# Patient Record
Sex: MALE | Race: WHITE | NOT HISPANIC OR LATINO | Employment: OTHER | URBAN - METROPOLITAN AREA
[De-identification: names, ages, dates, MRNs, and addresses within clinical notes are randomized per-mention and may not be internally consistent; named-entity substitution may affect disease eponyms.]

---

## 2018-07-05 ENCOUNTER — TRANSCRIBE ORDERS (OUTPATIENT)
Dept: ADMINISTRATIVE | Facility: HOSPITAL | Age: 63
End: 2018-07-05

## 2018-07-05 DIAGNOSIS — N20.0 CALCULUS OF KIDNEY: Primary | ICD-10-CM

## 2018-07-06 ENCOUNTER — APPOINTMENT (OUTPATIENT)
Dept: LAB | Facility: CLINIC | Age: 63
End: 2018-07-06
Payer: COMMERCIAL

## 2018-07-06 ENCOUNTER — TRANSCRIBE ORDERS (OUTPATIENT)
Dept: LAB | Facility: CLINIC | Age: 63
End: 2018-07-06

## 2018-07-06 DIAGNOSIS — M10.9 GOUT, UNSPECIFIED CAUSE, UNSPECIFIED CHRONICITY, UNSPECIFIED SITE: ICD-10-CM

## 2018-07-06 DIAGNOSIS — N20.0 URIC ACID NEPHROLITHIASIS: ICD-10-CM

## 2018-07-06 DIAGNOSIS — Z12.5 SPECIAL SCREENING FOR MALIGNANT NEOPLASM OF PROSTATE: ICD-10-CM

## 2018-07-06 DIAGNOSIS — N20.0 URIC ACID NEPHROLITHIASIS: Primary | ICD-10-CM

## 2018-07-06 LAB
ALBUMIN SERPL BCP-MCNC: 4.2 G/DL (ref 3.5–5)
ALP SERPL-CCNC: 48 U/L (ref 46–116)
ALT SERPL W P-5'-P-CCNC: 35 U/L (ref 12–78)
ANION GAP SERPL CALCULATED.3IONS-SCNC: 8 MMOL/L (ref 4–13)
AST SERPL W P-5'-P-CCNC: 18 U/L (ref 5–45)
BACTERIA UR QL AUTO: ABNORMAL /HPF
BASOPHILS # BLD AUTO: 0.05 THOUSANDS/ΜL (ref 0–0.1)
BASOPHILS NFR BLD AUTO: 1 % (ref 0–1)
BILIRUB SERPL-MCNC: 1.07 MG/DL (ref 0.2–1)
BILIRUB UR QL STRIP: NEGATIVE
BUN SERPL-MCNC: 18 MG/DL (ref 5–25)
CA-I BLD-SCNC: 1.17 MMOL/L (ref 1.12–1.32)
CALCIUM SERPL-MCNC: 9 MG/DL (ref 8.3–10.1)
CHLORIDE SERPL-SCNC: 105 MMOL/L (ref 100–108)
CHOLEST SERPL-MCNC: 150 MG/DL (ref 50–200)
CLARITY UR: ABNORMAL
CO2 SERPL-SCNC: 25 MMOL/L (ref 21–32)
COLOR UR: YELLOW
CREAT SERPL-MCNC: 1.21 MG/DL (ref 0.6–1.3)
CREAT UR-MCNC: 77.4 MG/DL
CREAT UR-MCNC: 77.4 MG/DL
EOSINOPHIL # BLD AUTO: 0.07 THOUSAND/ΜL (ref 0–0.61)
EOSINOPHIL NFR BLD AUTO: 1 % (ref 0–6)
ERYTHROCYTE [DISTWIDTH] IN BLOOD BY AUTOMATED COUNT: 12.5 % (ref 11.6–15.1)
GFR SERPL CREATININE-BSD FRML MDRD: 64 ML/MIN/1.73SQ M
GLUCOSE P FAST SERPL-MCNC: 83 MG/DL (ref 65–99)
GLUCOSE UR STRIP-MCNC: NEGATIVE MG/DL
HCT VFR BLD AUTO: 46.8 % (ref 36.5–49.3)
HDLC SERPL-MCNC: 42 MG/DL (ref 40–60)
HGB BLD-MCNC: 15.9 G/DL (ref 12–17)
HGB UR QL STRIP.AUTO: ABNORMAL
HYALINE CASTS #/AREA URNS LPF: ABNORMAL /LPF
IMM GRANULOCYTES # BLD AUTO: 0.02 THOUSAND/UL (ref 0–0.2)
IMM GRANULOCYTES NFR BLD AUTO: 0 % (ref 0–2)
KETONES UR STRIP-MCNC: NEGATIVE MG/DL
LDLC SERPL CALC-MCNC: 96 MG/DL (ref 0–100)
LEUKOCYTE ESTERASE UR QL STRIP: NEGATIVE
LYMPHOCYTES # BLD AUTO: 2.3 THOUSANDS/ΜL (ref 0.6–4.47)
LYMPHOCYTES NFR BLD AUTO: 36 % (ref 14–44)
MCH RBC QN AUTO: 32.4 PG (ref 26.8–34.3)
MCHC RBC AUTO-ENTMCNC: 34 G/DL (ref 31.4–37.4)
MCV RBC AUTO: 95 FL (ref 82–98)
MICROALBUMIN UR-MCNC: 97.7 MG/L (ref 0–20)
MICROALBUMIN/CREAT 24H UR: 126 MG/G CREATININE (ref 0–30)
MONOCYTES # BLD AUTO: 0.82 THOUSAND/ΜL (ref 0.17–1.22)
MONOCYTES NFR BLD AUTO: 13 % (ref 4–12)
NEUTROPHILS # BLD AUTO: 3.2 THOUSANDS/ΜL (ref 1.85–7.62)
NEUTS SEG NFR BLD AUTO: 49 % (ref 43–75)
NITRITE UR QL STRIP: NEGATIVE
NON-SQ EPI CELLS URNS QL MICRO: ABNORMAL /HPF
NONHDLC SERPL-MCNC: 108 MG/DL
NRBC BLD AUTO-RTO: 0 /100 WBCS
PH UR STRIP.AUTO: 6 [PH] (ref 4.5–8)
PHOSPHATE SERPL-MCNC: 2.3 MG/DL (ref 2.3–4.1)
PLATELET # BLD AUTO: 273 THOUSANDS/UL (ref 149–390)
PMV BLD AUTO: 10.3 FL (ref 8.9–12.7)
POTASSIUM SERPL-SCNC: 4.2 MMOL/L (ref 3.5–5.3)
PROT SERPL-MCNC: 7.7 G/DL (ref 6.4–8.2)
PROT UR STRIP-MCNC: ABNORMAL MG/DL
PROT UR-MCNC: 18 MG/DL
PROT/CREAT UR: 0.23 MG/G{CREAT} (ref 0–0.1)
RBC # BLD AUTO: 4.91 MILLION/UL (ref 3.88–5.62)
RBC #/AREA URNS AUTO: ABNORMAL /HPF
SODIUM SERPL-SCNC: 138 MMOL/L (ref 136–145)
SP GR UR STRIP.AUTO: 1.01 (ref 1–1.03)
TRIGL SERPL-MCNC: 62 MG/DL
URATE SERPL-MCNC: 7 MG/DL (ref 4.2–8)
UROBILINOGEN UR QL STRIP.AUTO: 0.2 E.U./DL
WBC # BLD AUTO: 6.46 THOUSAND/UL (ref 4.31–10.16)
WBC #/AREA URNS AUTO: ABNORMAL /HPF

## 2018-07-06 PROCEDURE — 80053 COMPREHEN METABOLIC PANEL: CPT

## 2018-07-06 PROCEDURE — 80061 LIPID PANEL: CPT

## 2018-07-06 PROCEDURE — 85025 COMPLETE CBC W/AUTO DIFF WBC: CPT

## 2018-07-06 PROCEDURE — 84153 ASSAY OF PSA TOTAL: CPT

## 2018-07-06 PROCEDURE — 82043 UR ALBUMIN QUANTITATIVE: CPT | Performed by: INTERNAL MEDICINE

## 2018-07-06 PROCEDURE — 82330 ASSAY OF CALCIUM: CPT

## 2018-07-06 PROCEDURE — 84154 ASSAY OF PSA FREE: CPT

## 2018-07-06 PROCEDURE — 84550 ASSAY OF BLOOD/URIC ACID: CPT

## 2018-07-06 PROCEDURE — 81001 URINALYSIS AUTO W/SCOPE: CPT | Performed by: INTERNAL MEDICINE

## 2018-07-06 PROCEDURE — 36415 COLL VENOUS BLD VENIPUNCTURE: CPT

## 2018-07-06 PROCEDURE — 84156 ASSAY OF PROTEIN URINE: CPT | Performed by: INTERNAL MEDICINE

## 2018-07-06 PROCEDURE — 84100 ASSAY OF PHOSPHORUS: CPT

## 2018-07-06 PROCEDURE — 82570 ASSAY OF URINE CREATININE: CPT | Performed by: INTERNAL MEDICINE

## 2018-07-07 LAB
PSA FREE MFR SERPL: 10.9 %
PSA FREE SERPL-MCNC: 0.51 NG/ML
PSA SERPL-MCNC: 4.7 NG/ML (ref 0–4)

## 2018-07-10 ENCOUNTER — HOSPITAL ENCOUNTER (OUTPATIENT)
Dept: CT IMAGING | Facility: HOSPITAL | Age: 63
Discharge: HOME | End: 2018-07-10
Attending: INTERNAL MEDICINE
Payer: COMMERCIAL

## 2018-07-10 DIAGNOSIS — N20.0 CALCULUS OF KIDNEY: ICD-10-CM

## 2018-07-10 PROCEDURE — 74176 CT ABD & PELVIS W/O CONTRAST: CPT

## 2018-09-05 ENCOUNTER — TRANSCRIBE ORDERS (OUTPATIENT)
Dept: ADMINISTRATIVE | Facility: HOSPITAL | Age: 63
End: 2018-09-05

## 2018-09-05 DIAGNOSIS — R31.9 HEMATURIA SYNDROME: Primary | ICD-10-CM

## 2018-09-06 ENCOUNTER — TELEPHONE (OUTPATIENT)
Dept: UROLOGY | Facility: AMBULATORY SURGERY CENTER | Age: 63
End: 2018-09-06

## 2018-09-06 DIAGNOSIS — R31.0 GROSS HEMATURIA: Primary | ICD-10-CM

## 2018-09-06 NOTE — TELEPHONE ENCOUNTER
PT has pending appointment for CT that was ordered by Dr Rishabh Henry for 9/12/18 - this appointment can be kept, central scheduling just needs to be notified so it can be changed  I called PT and and notified him of this that we may keep appt for CT on 9/12/18 at 3pm - no oral contrast is required  I then scheduled PT for cysto in [address] for 10/15/18 at 9:30am  I called central scheduling and the order was switched so CT Renal Protocol will be done on 9/12/18

## 2018-09-06 NOTE — TELEPHONE ENCOUNTER
Could you please order a new CT scan for patient? He would like to get it done before he comes in for appointment

## 2018-09-06 NOTE — TELEPHONE ENCOUNTER
Patient called to schedule an appointment in the [place] office  CT on file  He can be reached at [phone]    Reason for appointment/Complaint/Diagnosis : Mass on bladder    Insurance: [employer]    History of Cancer? no                       If yes, what kind? Previous urologist?     NO                  Records requested/where? In epic    Outside testing/where? NO    Location Preference for office visit?  [place]

## 2018-09-06 NOTE — TELEPHONE ENCOUNTER
Please schedule CT renal protocol ordered my Dr Lynn and cancel the other one that was ordered by Dr Augustina Breen  Patient can go any time and any day  Please contact patient once scheduled and schedule patient for a cysto after CT scan  Thank you

## 2018-09-12 ENCOUNTER — HOSPITAL ENCOUNTER (OUTPATIENT)
Dept: CT IMAGING | Facility: HOSPITAL | Age: 63
Discharge: HOME | End: 2018-09-12
Attending: INTERNAL MEDICINE
Payer: COMMERCIAL

## 2018-09-12 DIAGNOSIS — R31.0 GROSS HEMATURIA: ICD-10-CM

## 2018-09-12 PROCEDURE — 74178 CT ABD&PLV WO CNTR FLWD CNTR: CPT

## 2018-09-12 RX ADMIN — IOHEXOL 100 ML: 350 INJECTION, SOLUTION INTRAVENOUS at 15:11

## 2018-09-17 ENCOUNTER — TELEPHONE (OUTPATIENT)
Dept: UROLOGY | Facility: AMBULATORY SURGERY CENTER | Age: 63
End: 2018-09-17

## 2018-09-17 NOTE — TELEPHONE ENCOUNTER
Shan Hansen  This patients CT scan was denied with the insurance will require a peer to peer to see if you can get it approved   You do not need to make an appointment for this insurance number is [id number] tracking#[id number]    Thanks  Gama Hammond

## 2018-10-15 ENCOUNTER — PROCEDURE VISIT (OUTPATIENT)
Dept: UROLOGY | Facility: CLINIC | Age: 63
End: 2018-10-15
Payer: COMMERCIAL

## 2018-10-15 VITALS
BODY MASS INDEX: 28.88 KG/M2 | HEART RATE: 80 BPM | SYSTOLIC BLOOD PRESSURE: 150 MMHG | WEIGHT: 195 LBS | DIASTOLIC BLOOD PRESSURE: 80 MMHG | HEIGHT: 69 IN

## 2018-10-15 DIAGNOSIS — R31.0 GROSS HEMATURIA: Primary | ICD-10-CM

## 2018-10-15 DIAGNOSIS — N32.89 BLADDER MASS: ICD-10-CM

## 2018-10-15 PROCEDURE — 52000 CYSTOURETHROSCOPY: CPT | Performed by: UROLOGY

## 2018-10-15 PROCEDURE — 99203 OFFICE O/P NEW LOW 30 MIN: CPT | Performed by: UROLOGY

## 2018-10-15 RX ORDER — ALLOPURINOL 100 MG/1
TABLET ORAL
COMMUNITY
Start: 2018-10-05 | End: 2018-11-09 | Stop reason: DRUGHIGH

## 2018-10-15 RX ORDER — MULTIVITAMIN
1 TABLET ORAL DAILY
COMMUNITY
End: 2019-10-10

## 2018-10-15 RX ORDER — LISINOPRIL 2.5 MG/1
2.5 TABLET ORAL
COMMUNITY
Start: 2018-10-05 | End: 2019-10-16 | Stop reason: SDUPTHER

## 2018-10-15 NOTE — PROGRESS NOTES
UROLOGY NEW CONSULT NOTE     CHIEF COMPLAINT   Anne Quiroz is a 59 y o  male with a complaint of   Chief Complaint   Patient presents with   Eather Ballico Hematuria    Cystoscopy       History of Present Illness:     59 y o  male who developed gross hematuria and some voiding dysfunction in January of 2017  The patient has had several occurrences over the last almost 2 years  He thought that he was passing stones although he has never had a history of stone disease in the past   Patient finally sought out care with a nephrologist who recommended return to our office  Patient was able to obtain a CT urogram prior to his visit  He has never been a cigarette or cigar smoker and does not use smokeless tobacco   There is a cancer history in his family but not renal or bladder  Past Medical History:     Past Medical History:   Diagnosis Date    Hypertension     Tinnitus        PAST SURGICAL HISTORY:   History reviewed  No pertinent surgical history  CURRENT MEDICATIONS:     Current Outpatient Prescriptions   Medication Sig Dispense Refill    allopurinol (ZYLOPRIM) 100 mg tablet       lisinopril (ZESTRIL) 2 5 mg tablet       Multiple Vitamin (MULTIVITAMIN) tablet Take 1 tablet by mouth daily       No current facility-administered medications for this visit  ALLERGIES:   No Known Allergies    SOCIAL HISTORY:     Social History     Social History    Marital status:      Spouse name: N/A    Number of children: N/A    Years of education: N/A     Social History Main Topics    Smoking status: Never Smoker    Smokeless tobacco: Never Used    Alcohol use Yes    Drug use: No    Sexual activity: Not Asked     Other Topics Concern    None     Social History Narrative    None       SOCIAL HISTORY:   History reviewed  No pertinent family history  REVIEW OF SYSTEMS:     Review of Systems   Constitutional: Negative  Respiratory: Negative  Cardiovascular: Negative      Gastrointestinal: Negative  Genitourinary: Positive for hematuria  Musculoskeletal: Negative  Neurological: Negative  Psychiatric/Behavioral: Negative  PHYSICAL EXAM:     /80 (BP Location: Right arm, Patient Position: Sitting, Cuff Size: Standard)   Pulse 80   Ht 5' 9" (1 753 m)   Wt 88 5 kg (195 lb)   BMI 28 80 kg/m²     General:  Healthy appearing male in no acute distress  They have a normal affect  There is not appear to be any gross neurologic defects or abnormalities  HEENT:  Normocephalic, atraumatic  Neck is supple without any palpable lymphadenopathy  Cardiovascular:  Patient has normal palpable distal radial pulses  There is no significant peripheral edema  No JVD is noted  Respiratory:  Patient has unlabored respirations  There is no audible wheeze or rhonchi  Abdomen:  Abdomen is without surgical scars  Abdomen is soft and nontender  There is no tympany  Inguinal and umbilical hernia are not appreciated  Genitourinary:   Circumcised phallus, orthotopic meatus, testicles descended    Musculoskeletal:  Patient does not have significant CVA tenderness in the  flank with palpation or percussion  They full range of motion in all 4 extremities  Strength in all 4 extremities appears congruent  Patient is able to ambulate without assistance or difficulty  Dermatologic:  Patient has no skin abnormalities or rashes        LABS:     CBC:   Lab Results   Component Value Date    WBC 6 46 2018    HGB 15 9 2018    HCT 46 8 2018    MCV 95 2018     2018       BMP:   Lab Results   Component Value Date    CALCIUM 9 0 2018     2018    K 4 2 2018    CO2 25 2018     2018    BUN 18 2018    CREATININE 1 21 2018     Lab Results   Component Value Date    PSA 4 7 (H) 2018     IMAGIN/12/18  CT ABDOMEN AND PELVIS WITH AND WITHOUT IV CONTRAST     INDICATION:   R31 0: Gross hematuria      COMPARISON: CT scan 7/10/2018      TECHNIQUE: Initial CT of the abdomen and pelvis was performed without intravenous contrast   Subsequent dynamic CT evaluation of the abdomen and pelvis was performed after the administration of intravenous contrast in both nephrographic and delayed   phases after the administration of intravenous contrast   Axial, sagittal, and coronal 2D reformatted images were created from the source data and submitted for interpretation       Radiation dose length product (DLP) for this visit:  1852 mGy-cm   This examination, like all CT scans performed in the [facility], was performed utilizing techniques to minimize radiation dose exposure, including the use of iterative   reconstruction and automated exposure control      IV Contrast:  100 mL of iohexol (OMNIPAQUE)  Enteric Contrast:  Enteric contrast was not administered      FINDINGS:     ABDOMEN     RIGHT KIDNEY AND URETER:  No solid renal mass  No detectable urothelial mass  No hydronephrosis or hydroureter  No urinary tract calculi  No perinephric collection      LEFT KIDNEY AND URETER:  No solid renal mass  No detectable urothelial mass  No hydronephrosis or hydroureter  No urinary tract calculi  No perinephric collection      URINARY BLADDER:  There is a 3 3 cm soft tissue mass in the left posterior lateral aspect of the bladder, adjacent to the ureteral orifice  No calculi         LOWER CHEST:  No clinically significant abnormality identified in the visualized lower chest      LIVER/BILIARY TREE:  Unremarkable      GALLBLADDER:  No calcified gallstones  No pericholecystic inflammatory change      SPLEEN:  Unremarkable      PANCREAS:  Unremarkable      ADRENAL GLANDS:  Unremarkable      STOMACH AND BOWEL:  There is colonic diverticulosis without evidence of acute diverticulitis      ABDOMINOPELVIC CAVITY:  No ascites  No free intraperitoneal air   No lymphadenopathy      VESSELS:  Unremarkable for patient's age      PELVIS     REPRODUCTIVE ORGANS:  Unremarkable for patient's age      APPENDIX: No findings to suggest appendicitis      ABDOMINAL WALL/INGUINAL REGIONS:  There is a small fat-containing umbilical hernia      OSSEOUS STRUCTURES:  No acute fracture or destructive osseous lesion      IMPRESSION:     3 3 cm mass in the left posterior lateral aspect of the urinary bladder  No evidence of metastatic disease      Colonic diverticulosis  PROCEDURE:     See note    ASSESSMENT:     59 y o  male with gross hematuria    PLAN:     Patient has a large left-sided bladder mass  It is difficult to visualize the left ureteral orifice  Given the findings of today's procedure, I have recommended moving forward with cystoscopy and transurethral resection of his bladder tumor in the operating room  I have recommended 1 time installation of mitomycin-C post procedure  Risks and benefits of the procedure been discussed the patient and he has signed informed consent  We will schedule the surgery in the near future

## 2018-10-15 NOTE — PROGRESS NOTES
Cystoscopy  Date/Time: 10/15/2018 9:47 AM  Performed by: Claudene Poser  Authorized by: Claudene Poser     Procedure details: cystoscopy    Patient tolerance: Patient tolerated the procedure well with no immediate complications    Additional Procedure Details:   Cystoscopy procedure note:    Risk and benefits of flexible cystoscopy were discussed  Informed consent was obtained  Urine dip was adequate for cystoscopy  The patient was placed in the supine position  His genitalia was prepped with Betadine and draped in a sterile fashion  Viscous 2% lidocaine jelly was instilled into the urethra and allowed dwell time for local anesthesia  Flexible cystoscopy was then performed using a 16F scope  The distal urethra and prostatic urethra were evaluated and were normal in course and caliber  Once inside the bladder, it was carefully inspected in a 360 degree fashion  We medially encounter a large papillary tumor along the left trigone and lateral wall that was papillary in appearance  Left ureteral orifice was difficult to appreciate  Retroflexion re-demonstrated the tumor  Overall this was a cystoscopy that demonstrated a large left-sided bladder tumor

## 2018-10-15 NOTE — PATIENT INSTRUCTIONS
Transurethral Resection of Bladder Tumors   WHAT YOU NEED TO KNOW:   Transurethral resection of bladder tumors (TURBT) is surgery to remove one or more tumors from your bladder  HOW TO PREPARE:   The week before your surgery:   · Write down the correct date, time, and location of your surgery  · Arrange a ride home  Ask a family member or friend to drive you home after your surgery or procedure  Do not drive yourself home  · Ask your caregiver if you need to stop using aspirin or any other prescribed or over-the-counter medicine before your procedure or surgery  · Bring your medicine bottles or a list of your medicines when you see your caregiver  Tell your caregiver if you are allergic to any medicine  Tell your caregiver if you use any herbs, food supplements, or over-the-counter medicine  · You may need blood or urine tests before your surgery  You may also need a CT scan or a cystoscopy  Talk to your healthcare provider about these or other tests you may need  Write down the date, time, and location for each test   The night before your surgery:  Ask caregivers about directions for eating and drinking  The day of your surgery:   · Ask your caregiver before you take any medicine on the day of your surgery  Bring a list of all the medicines you take, or your pill bottles, with you to the hospital  Caregivers will check that your medicines will not interact poorly with the medicine you need for surgery  · You or a close family member will be asked to sign a legal document called a consent form  It gives caregivers permission to do the procedure or surgery  It also explains the problems that may happen, and your choices  Make sure all your questions are answered before you sign this form  · Caregivers may insert an intravenous tube (IV) into your vein  A vein in the arm is usually chosen  Through the IV tube, you may be given liquids and medicine       · An anesthesiologist will talk to you before your surgery  You may need medicine to keep you asleep or numb an area of your body during surgery  Tell caregivers if you or anyone in your family has had a problem with anesthesia in the past   WHAT WILL HAPPEN:   What will happen: Your surgeon will insert a scope through your urethra and into your bladder  He will put fluid through the scope to wash your bladder and widen it for surgery  The scope will have a wire with an electric current  The current is used to stop bleeding in your bladder and remove bladder tumors  Your surgeon may also remove muscle and tissue from your bladder  He may use the scope to insert medicine into your bladder  The medicine will destroy pieces of tumor in your bladder and help prevent new tumors from growing  Your surgeon will remove the scope  After your surgery: You will be taken to a room to rest until you are fully awake  You will be monitored closely for any problems  Do not get out of bed until your healthcare provider says it is okay  You will then be able to go home or be taken to your hospital room  CONTACT YOUR HEALTHCARE PROVIDER IF:   · You cannot make it to your surgery  · You have a fever  · You get a cold or the flu  · You have questions or concerns about your surgery  SEEK CARE IMMEDIATELY IF:   · You have bleeding from your urethra that does not stop  · You start to urinate less often, very little, or not at all  · You have severe pain in your abdomen or pelvis  RISKS:   You may bleed more than expected or get an infection  Your bladder may be damaged  It may be painful to urinate, or you may have blood in your urine  You may feel discomfort in your abdomen or pelvis  You may feel like you need to urinate more often or without warning  You may develop more bladder tumors  CARE AGREEMENT:   You have the right to help plan your care  Learn about your health condition and how it may be treated   Discuss treatment options with your caregivers to decide what care you want to receive  You always have the right to refuse treatment  © 2017 [employer] Information is for End User's use only and may not be sold, redistributed or otherwise used for commercial purposes  All illustrations and images included in CareNotes® are the copyrighted property of A [employer] M [employer] Inc  or [employer]  The above information is an  only  It is not intended as medical advice for individual conditions or treatments  Talk to your doctor, nurse or pharmacist before following any medical regimen to see if it is safe and effective for you

## 2018-10-15 NOTE — LETTER
October 15, 2018     Kiera Hernandez, [address] [id number] [facility] [address]    Patient: Kumar Crowe   YOB: 1955   Date of Visit: 10/15/2018       Dear Dr Miroslava Rao: Thank you for referring Berta Guerrier to me for evaluation  Below are my notes for this consultation  If you have questions, please do not hesitate to call me  I look forward to following your patient along with you  Sincerely,        Kate Chiang MD        CC: Laverna Kehr, MD Evy Mungo, MD  10/15/2018  9:47 AM  Sign at close encounter  Cystoscopy  Date/Time: 10/15/2018 9:47 AM  Performed by: Rommie Crigler by: Tigist Richards     Procedure details: cystoscopy    Patient tolerance: Patient tolerated the procedure well with no immediate complications    Additional Procedure Details:   Cystoscopy procedure note:    Risk and benefits of flexible cystoscopy were discussed  Informed consent was obtained  Urine dip was adequate for cystoscopy  The patient was placed in the supine position  His genitalia was prepped with Betadine and draped in a sterile fashion  Viscous 2% lidocaine jelly was instilled into the urethra and allowed dwell time for local anesthesia  Flexible cystoscopy was then performed using a 16F scope  The distal urethra and prostatic urethra were evaluated and were normal in course and caliber  Once inside the bladder, it was carefully inspected in a 360 degree fashion  We medially encounter a large papillary tumor along the left trigone and lateral wall that was papillary in appearance  Left ureteral orifice was difficult to appreciate  Retroflexion re-demonstrated the tumor  Overall this was a cystoscopy that demonstrated a large left-sided bladder tumor                  Kate Chiang MD  10/15/2018  9:46 AM  Sign at close encounter    [address]   Kumar Crowe is a 59 y o  male with a complaint of   Chief Complaint   Patient presents with   Gordon [place] Hematuria    Cystoscopy       History of Present Illness:     59 y o  male who developed gross hematuria and some voiding dysfunction in January of 2017  The patient has had several occurrences over the last almost 2 years  He thought that he was passing stones although he has never had a history of stone disease in the past   Patient finally sought out care with a nephrologist who recommended return to our office  Patient was able to obtain a CT urogram prior to his visit  He has never been a cigarette or cigar smoker and does not use smokeless tobacco   There is a cancer history in his family but not renal or bladder  Past Medical History:     Past Medical History:   Diagnosis Date    Hypertension     Tinnitus        PAST SURGICAL HISTORY:   History reviewed  No pertinent surgical history  CURRENT MEDICATIONS:     Current Outpatient Prescriptions   Medication Sig Dispense Refill    allopurinol (ZYLOPRIM) 100 mg tablet       lisinopril (ZESTRIL) 2 5 mg tablet       Multiple Vitamin (MULTIVITAMIN) tablet Take 1 tablet by mouth daily       No current facility-administered medications for this visit  ALLERGIES:   No Known Allergies    SOCIAL HISTORY:     Social History     Social History    Marital status:      Spouse name: N/A    Number of children: N/A    Years of education: N/A     Social History Main Topics    Smoking status: Never Smoker    Smokeless tobacco: Never Used    Alcohol use Yes    Drug use: No    Sexual activity: Not Asked     Other Topics Concern    None     Social History Narrative    None       SOCIAL HISTORY:   History reviewed  No pertinent family history  REVIEW OF SYSTEMS:     Review of Systems   Constitutional: Negative  Respiratory: Negative  Cardiovascular: Negative  Gastrointestinal: Negative  Genitourinary: Positive for hematuria  Musculoskeletal: Negative  Neurological: Negative  Psychiatric/Behavioral: Negative  PHYSICAL EXAM:     /80 (BP Location: Right arm, Patient Position: Sitting, Cuff Size: Standard)   Pulse 80   Ht 5' 9" (1 753 m)   Wt 88 5 kg (195 lb)   BMI 28 80 kg/m²      General:  Healthy appearing male in no acute distress  They have a normal affect  There is not appear to be any gross neurologic defects or abnormalities  HEENT:  Normocephalic, atraumatic  Neck is supple without any palpable lymphadenopathy  Cardiovascular:  Patient has normal palpable distal radial pulses  There is no significant peripheral edema  No JVD is noted  Respiratory:  Patient has unlabored respirations  There is no audible wheeze or rhonchi  Abdomen:  Abdomen is without surgical scars  Abdomen is soft and nontender  There is no tympany  Inguinal and umbilical hernia are not appreciated  Genitourinary:   Circumcised phallus, orthotopic meatus, testicles descended    Musculoskeletal:  Patient does not have significant CVA tenderness in the  flank with palpation or percussion  They full range of motion in all 4 extremities  Strength in all 4 extremities appears congruent  Patient is able to ambulate without assistance or difficulty  Dermatologic:  Patient has no skin abnormalities or rashes        LABS:     CBC:   Lab Results   Component Value Date    WBC 6 46 2018    HGB 15 9 2018    HCT 46 8 2018    MCV 95 2018     2018       BMP:   Lab Results   Component Value Date    CALCIUM 9 0 2018     2018    K 4 2 2018    CO2 25 2018     2018    BUN 18 2018    CREATININE 1 21 2018     Lab Results   Component Value Date    PSA 4 7 (H) 2018     IMAGIN/12/18  CT ABDOMEN AND PELVIS WITH AND WITHOUT IV CONTRAST     INDICATION:   R31 0: Gross hematuria      COMPARISON:  CT scan 7/10/2018      TECHNIQUE: Initial CT of the abdomen and pelvis was performed without intravenous contrast   Subsequent dynamic CT evaluation of the abdomen and pelvis was performed after the administration of intravenous contrast in both nephrographic and delayed   phases after the administration of intravenous contrast   Axial, sagittal, and coronal 2D reformatted images were created from the source data and submitted for interpretation       Radiation dose length product (DLP) for this visit:  1852 mGy-cm   This examination, like all CT scans performed in the [facility], was performed utilizing techniques to minimize radiation dose exposure, including the use of iterative   reconstruction and automated exposure control      IV Contrast:  100 mL of iohexol (OMNIPAQUE)  Enteric Contrast:  Enteric contrast was not administered      FINDINGS:     ABDOMEN     RIGHT KIDNEY AND URETER:  No solid renal mass  No detectable urothelial mass  No hydronephrosis or hydroureter  No urinary tract calculi  No perinephric collection      LEFT KIDNEY AND URETER:  No solid renal mass  No detectable urothelial mass  No hydronephrosis or hydroureter  No urinary tract calculi  No perinephric collection      URINARY BLADDER:  There is a 3 3 cm soft tissue mass in the left posterior lateral aspect of the bladder, adjacent to the ureteral orifice  No calculi         LOWER CHEST:  No clinically significant abnormality identified in the visualized lower chest      LIVER/BILIARY TREE:  Unremarkable      GALLBLADDER:  No calcified gallstones  No pericholecystic inflammatory change      SPLEEN:  Unremarkable      PANCREAS:  Unremarkable      ADRENAL GLANDS:  Unremarkable      STOMACH AND BOWEL:  There is colonic diverticulosis without evidence of acute diverticulitis      ABDOMINOPELVIC CAVITY:  No ascites  No free intraperitoneal air   No lymphadenopathy      VESSELS:  Unremarkable for patient's age      PELVIS     REPRODUCTIVE ORGANS:  Unremarkable for patient's age      APPENDIX: No findings to suggest appendicitis      ABDOMINAL WALL/INGUINAL REGIONS:  There is a small fat-containing umbilical hernia      OSSEOUS STRUCTURES:  No acute fracture or destructive osseous lesion      IMPRESSION:     3 3 cm mass in the left posterior lateral aspect of the urinary bladder  No evidence of metastatic disease      Colonic diverticulosis  PROCEDURE:     See note    ASSESSMENT:     59 y o  male with gross hematuria    PLAN:     Patient has a large left-sided bladder mass  It is difficult to visualize the left ureteral orifice  Given the findings of today's procedure, I have recommended moving forward with cystoscopy and transurethral resection of his bladder tumor in the operating room  I have recommended 1 time installation of mitomycin-C post procedure  Risks and benefits of the procedure been discussed the patient and he has signed informed consent  We will schedule the surgery in the near future

## 2018-11-08 ENCOUNTER — ANESTHESIA EVENT (OUTPATIENT)
Dept: PERIOP | Facility: HOSPITAL | Age: 63
End: 2018-11-08
Payer: COMMERCIAL

## 2018-11-08 RX ORDER — SODIUM CHLORIDE 9 MG/ML
125 INJECTION, SOLUTION INTRAVENOUS CONTINUOUS
Status: CANCELLED | OUTPATIENT
Start: 2018-11-14

## 2018-11-09 ENCOUNTER — APPOINTMENT (OUTPATIENT)
Dept: PREADMISSION TESTING | Facility: HOSPITAL | Age: 63
End: 2018-11-09
Payer: COMMERCIAL

## 2018-11-09 ENCOUNTER — APPOINTMENT (OUTPATIENT)
Dept: LAB | Facility: HOSPITAL | Age: 63
End: 2018-11-09
Attending: UROLOGY
Payer: COMMERCIAL

## 2018-11-09 ENCOUNTER — HOSPITAL ENCOUNTER (OUTPATIENT)
Dept: NON INVASIVE DIAGNOSTICS | Facility: HOSPITAL | Age: 63
Discharge: HOME | End: 2018-11-09
Attending: UROLOGY
Payer: COMMERCIAL

## 2018-11-09 DIAGNOSIS — R31.0 GROSS HEMATURIA: ICD-10-CM

## 2018-11-09 DIAGNOSIS — N32.89 BLADDER MASS: ICD-10-CM

## 2018-11-09 LAB
ANION GAP SERPL CALCULATED.3IONS-SCNC: 10 MMOL/L (ref 4–13)
APTT PPP: 36 SECONDS (ref 24–36)
ATRIAL RATE: 67 BPM
BASOPHILS # BLD AUTO: 0.08 THOUSANDS/ΜL (ref 0–0.1)
BASOPHILS NFR BLD AUTO: 1 % (ref 0–1)
BUN SERPL-MCNC: 14 MG/DL (ref 5–25)
CALCIUM SERPL-MCNC: 9.1 MG/DL (ref 8.3–10.1)
CHLORIDE SERPL-SCNC: 102 MMOL/L (ref 100–108)
CO2 SERPL-SCNC: 26 MMOL/L (ref 21–32)
CREAT SERPL-MCNC: 1.12 MG/DL (ref 0.6–1.3)
EOSINOPHIL # BLD AUTO: 0.09 THOUSAND/ΜL (ref 0–0.61)
EOSINOPHIL NFR BLD AUTO: 1 % (ref 0–6)
ERYTHROCYTE [DISTWIDTH] IN BLOOD BY AUTOMATED COUNT: 12.4 % (ref 11.6–15.1)
GFR SERPL CREATININE-BSD FRML MDRD: 70 ML/MIN/1.73SQ M
GLUCOSE P FAST SERPL-MCNC: 92 MG/DL (ref 65–99)
HCT VFR BLD AUTO: 46 % (ref 36.5–49.3)
HGB BLD-MCNC: 15.8 G/DL (ref 12–17)
IMM GRANULOCYTES # BLD AUTO: 0.03 THOUSAND/UL (ref 0–0.2)
IMM GRANULOCYTES NFR BLD AUTO: 0 % (ref 0–2)
INR PPP: 1.07 (ref 0.86–1.17)
LYMPHOCYTES # BLD AUTO: 2.07 THOUSANDS/ΜL (ref 0.6–4.47)
LYMPHOCYTES NFR BLD AUTO: 23 % (ref 14–44)
MCH RBC QN AUTO: 32.4 PG (ref 26.8–34.3)
MCHC RBC AUTO-ENTMCNC: 34.3 G/DL (ref 31.4–37.4)
MCV RBC AUTO: 95 FL (ref 82–98)
MONOCYTES # BLD AUTO: 1.27 THOUSAND/ΜL (ref 0.17–1.22)
MONOCYTES NFR BLD AUTO: 14 % (ref 4–12)
NEUTROPHILS # BLD AUTO: 5.57 THOUSANDS/ΜL (ref 1.85–7.62)
NEUTS SEG NFR BLD AUTO: 61 % (ref 43–75)
NRBC BLD AUTO-RTO: 0 /100 WBCS
P AXIS: 37 DEGREES
PLATELET # BLD AUTO: 246 THOUSANDS/UL (ref 149–390)
PMV BLD AUTO: 9.3 FL (ref 8.9–12.7)
POTASSIUM SERPL-SCNC: 3.7 MMOL/L (ref 3.5–5.3)
PR INTERVAL: 184 MS
PROTHROMBIN TIME: 14 SECONDS (ref 11.8–14.2)
QRS AXIS: -25 DEGREES
QRSD INTERVAL: 86 MS
QT INTERVAL: 388 MS
QTC INTERVAL: 409 MS
RBC # BLD AUTO: 4.87 MILLION/UL (ref 3.88–5.62)
SODIUM SERPL-SCNC: 138 MMOL/L (ref 136–145)
T WAVE AXIS: 44 DEGREES
VENTRICULAR RATE: 67 BPM
WBC # BLD AUTO: 9.11 THOUSAND/UL (ref 4.31–10.16)

## 2018-11-09 PROCEDURE — 87086 URINE CULTURE/COLONY COUNT: CPT

## 2018-11-09 PROCEDURE — 85610 PROTHROMBIN TIME: CPT

## 2018-11-09 PROCEDURE — 36415 COLL VENOUS BLD VENIPUNCTURE: CPT

## 2018-11-09 PROCEDURE — 80048 BASIC METABOLIC PNL TOTAL CA: CPT

## 2018-11-09 PROCEDURE — 85730 THROMBOPLASTIN TIME PARTIAL: CPT

## 2018-11-09 PROCEDURE — 93005 ELECTROCARDIOGRAM TRACING: CPT

## 2018-11-09 PROCEDURE — 93010 ELECTROCARDIOGRAM REPORT: CPT | Performed by: INTERNAL MEDICINE

## 2018-11-09 PROCEDURE — 85025 COMPLETE CBC W/AUTO DIFF WBC: CPT

## 2018-11-09 RX ORDER — ACETAMINOPHEN 500 MG
1000 TABLET ORAL EVERY 6 HOURS PRN
COMMUNITY

## 2018-11-09 NOTE — ANESTHESIA PREPROCEDURE EVALUATION
Review of Systems/Medical History  Patient summary reviewed  Chart reviewed  No history of anesthetic complications     Cardiovascular  Hypertension controlled,    Pulmonary  Negative pulmonary ROS        GI/Hepatic  Negative GI/hepatic ROS          Genitourinary malignancy Bladder cancer,        Endo/Other  Negative endo/other ROS      GYN  Negative gynecology ROS          Hematology  Negative hematology ROS      Musculoskeletal  Gout,        Neurology  Negative neurology ROS      Psychology   Negative psychology ROS              Physical Exam    Airway    Mallampati score: I  TM Distance: >3 FB  Neck ROM: full     Dental   No notable dental hx     Cardiovascular  Rhythm: regular, Rate: normal, Cardiovascular exam normal    Pulmonary  Pulmonary exam normal Breath sounds clear to auscultation,     Other Findings        Anesthesia Plan  ASA Score- 2     Anesthesia Type- general with ASA Monitors  Additional Monitors:   Airway Plan:         Plan Factors-Patient not instructed to abstain from smoking on day of procedure  Patient did not smoke on day of surgery  Induction- intravenous  Postoperative Plan-     Informed Consent- Anesthetic plan and risks discussed with patient

## 2018-11-10 LAB — BACTERIA UR CULT: NORMAL

## 2018-11-14 ENCOUNTER — HOSPITAL ENCOUNTER (OUTPATIENT)
Facility: HOSPITAL | Age: 63
Setting detail: OUTPATIENT SURGERY
Discharge: HOME | End: 2018-11-14
Attending: UROLOGY | Admitting: UROLOGY
Payer: COMMERCIAL

## 2018-11-14 ENCOUNTER — ANESTHESIA (OUTPATIENT)
Dept: PERIOP | Facility: HOSPITAL | Age: 63
End: 2018-11-14
Payer: COMMERCIAL

## 2018-11-14 VITALS
RESPIRATION RATE: 16 BRPM | BODY MASS INDEX: 28.29 KG/M2 | WEIGHT: 191 LBS | DIASTOLIC BLOOD PRESSURE: 70 MMHG | HEIGHT: 69 IN | SYSTOLIC BLOOD PRESSURE: 143 MMHG | OXYGEN SATURATION: 98 % | TEMPERATURE: 97.7 F | HEART RATE: 72 BPM

## 2018-11-14 DIAGNOSIS — N32.89 BLADDER MASS: ICD-10-CM

## 2018-11-14 DIAGNOSIS — R31.0 GROSS HEMATURIA: ICD-10-CM

## 2018-11-14 PROCEDURE — 88112 CYTOPATH CELL ENHANCE TECH: CPT | Performed by: PATHOLOGY

## 2018-11-14 PROCEDURE — C1758 CATHETER, URETERAL: HCPCS | Performed by: UROLOGY

## 2018-11-14 PROCEDURE — 88307 TISSUE EXAM BY PATHOLOGIST: CPT | Performed by: PATHOLOGY

## 2018-11-14 PROCEDURE — 52240 CYSTOSCOPY AND TREATMENT: CPT | Performed by: UROLOGY

## 2018-11-14 RX ORDER — HYDROMORPHONE HCL/PF 1 MG/ML
0.5 SYRINGE (ML) INJECTION
Status: DISCONTINUED | OUTPATIENT
Start: 2018-11-14 | End: 2018-11-14 | Stop reason: HOSPADM

## 2018-11-14 RX ORDER — PROPOFOL 10 MG/ML
INJECTION, EMULSION INTRAVENOUS AS NEEDED
Status: DISCONTINUED | OUTPATIENT
Start: 2018-11-14 | End: 2018-11-14 | Stop reason: SURG

## 2018-11-14 RX ORDER — ONDANSETRON 2 MG/ML
INJECTION INTRAMUSCULAR; INTRAVENOUS AS NEEDED
Status: DISCONTINUED | OUTPATIENT
Start: 2018-11-14 | End: 2018-11-14 | Stop reason: SURG

## 2018-11-14 RX ORDER — GLYCOPYRROLATE 0.2 MG/ML
INJECTION INTRAMUSCULAR; INTRAVENOUS AS NEEDED
Status: DISCONTINUED | OUTPATIENT
Start: 2018-11-14 | End: 2018-11-14 | Stop reason: SURG

## 2018-11-14 RX ORDER — TAMSULOSIN HYDROCHLORIDE 0.4 MG/1
0.4 CAPSULE ORAL ONCE
Status: COMPLETED | OUTPATIENT
Start: 2018-11-14 | End: 2018-11-14

## 2018-11-14 RX ORDER — FENTANYL CITRATE/PF 50 MCG/ML
25 SYRINGE (ML) INJECTION
Status: DISCONTINUED | OUTPATIENT
Start: 2018-11-14 | End: 2018-11-14 | Stop reason: HOSPADM

## 2018-11-14 RX ORDER — PHENAZOPYRIDINE HYDROCHLORIDE 100 MG/1
100 TABLET, FILM COATED ORAL ONCE
Status: COMPLETED | OUTPATIENT
Start: 2018-11-14 | End: 2018-11-14

## 2018-11-14 RX ORDER — MAGNESIUM HYDROXIDE 1200 MG/15ML
LIQUID ORAL AS NEEDED
Status: DISCONTINUED | OUTPATIENT
Start: 2018-11-14 | End: 2018-11-14 | Stop reason: HOSPADM

## 2018-11-14 RX ORDER — TAMSULOSIN HYDROCHLORIDE 0.4 MG/1
0.4 CAPSULE ORAL
Qty: 30 CAPSULE | Refills: 0 | Status: SHIPPED | OUTPATIENT
Start: 2018-11-14 | End: 2018-11-19 | Stop reason: SDUPTHER

## 2018-11-14 RX ORDER — ONDANSETRON 2 MG/ML
4 INJECTION INTRAMUSCULAR; INTRAVENOUS ONCE AS NEEDED
Status: DISCONTINUED | OUTPATIENT
Start: 2018-11-14 | End: 2018-11-14 | Stop reason: HOSPADM

## 2018-11-14 RX ORDER — DEXAMETHASONE SODIUM PHOSPHATE 4 MG/ML
INJECTION, SOLUTION INTRA-ARTICULAR; INTRALESIONAL; INTRAMUSCULAR; INTRAVENOUS; SOFT TISSUE AS NEEDED
Status: DISCONTINUED | OUTPATIENT
Start: 2018-11-14 | End: 2018-11-14 | Stop reason: SURG

## 2018-11-14 RX ORDER — GLYCINE 1.5 G/100ML
SOLUTION IRRIGATION AS NEEDED
Status: DISCONTINUED | OUTPATIENT
Start: 2018-11-14 | End: 2018-11-14 | Stop reason: HOSPADM

## 2018-11-14 RX ORDER — PHENAZOPYRIDINE HYDROCHLORIDE 100 MG/1
100 TABLET, FILM COATED ORAL 3 TIMES DAILY PRN
Qty: 20 TABLET | Refills: 0 | Status: SHIPPED | OUTPATIENT
Start: 2018-11-14 | End: 2018-11-19

## 2018-11-14 RX ORDER — MIDAZOLAM HYDROCHLORIDE 1 MG/ML
INJECTION INTRAMUSCULAR; INTRAVENOUS AS NEEDED
Status: DISCONTINUED | OUTPATIENT
Start: 2018-11-14 | End: 2018-11-14 | Stop reason: SURG

## 2018-11-14 RX ORDER — ROCURONIUM BROMIDE 10 MG/ML
INJECTION, SOLUTION INTRAVENOUS AS NEEDED
Status: DISCONTINUED | OUTPATIENT
Start: 2018-11-14 | End: 2018-11-14 | Stop reason: SURG

## 2018-11-14 RX ORDER — FENTANYL CITRATE 50 UG/ML
INJECTION, SOLUTION INTRAMUSCULAR; INTRAVENOUS AS NEEDED
Status: DISCONTINUED | OUTPATIENT
Start: 2018-11-14 | End: 2018-11-14 | Stop reason: SURG

## 2018-11-14 RX ORDER — SODIUM CHLORIDE 9 MG/ML
125 INJECTION, SOLUTION INTRAVENOUS CONTINUOUS
Status: DISCONTINUED | OUTPATIENT
Start: 2018-11-14 | End: 2018-11-14 | Stop reason: HOSPADM

## 2018-11-14 RX ADMIN — MIDAZOLAM 2 MG: 1 INJECTION INTRAMUSCULAR; INTRAVENOUS at 11:50

## 2018-11-14 RX ADMIN — PHENAZOPYRIDINE HYDROCHLORIDE 100 MG: 100 TABLET ORAL at 14:27

## 2018-11-14 RX ADMIN — GLYCOPYRROLATE 0.4 MG: 0.2 INJECTION, SOLUTION INTRAMUSCULAR; INTRAVENOUS at 12:26

## 2018-11-14 RX ADMIN — NEOSTIGMINE METHYLSULFATE 3 MG: 1 INJECTION, SOLUTION INTRAMUSCULAR; INTRAVENOUS; SUBCUTANEOUS at 12:26

## 2018-11-14 RX ADMIN — ROCURONIUM BROMIDE 50 MG: 10 INJECTION INTRAVENOUS at 11:58

## 2018-11-14 RX ADMIN — TAMSULOSIN HYDROCHLORIDE 0.4 MG: 0.4 CAPSULE ORAL at 14:28

## 2018-11-14 RX ADMIN — LIDOCAINE HYDROCHLORIDE 50 MG: 20 INJECTION, SOLUTION INTRAVENOUS at 11:57

## 2018-11-14 RX ADMIN — ONDANSETRON HYDROCHLORIDE 4 MG: 2 INJECTION, SOLUTION INTRAVENOUS at 12:27

## 2018-11-14 RX ADMIN — DEXAMETHASONE SODIUM PHOSPHATE 8 MG: 4 INJECTION, SOLUTION INTRAMUSCULAR; INTRAVENOUS at 12:40

## 2018-11-14 RX ADMIN — SODIUM CHLORIDE: 0.9 INJECTION, SOLUTION INTRAVENOUS at 12:32

## 2018-11-14 RX ADMIN — PROPOFOL 200 MG: 10 INJECTION, EMULSION INTRAVENOUS at 11:57

## 2018-11-14 RX ADMIN — FENTANYL CITRATE 150 MCG: 50 INJECTION, SOLUTION INTRAMUSCULAR; INTRAVENOUS at 11:57

## 2018-11-14 RX ADMIN — DEXAMETHASONE SODIUM PHOSPHATE 4 MG: 4 INJECTION, SOLUTION INTRAMUSCULAR; INTRAVENOUS at 12:17

## 2018-11-14 RX ADMIN — SODIUM CHLORIDE 125 ML/HR: 0.9 INJECTION, SOLUTION INTRAVENOUS at 10:24

## 2018-11-14 RX ADMIN — CEFAZOLIN SODIUM 2000 MG: 2 SOLUTION INTRAVENOUS at 12:05

## 2018-11-14 RX ADMIN — FENTANYL CITRATE 50 MCG: 50 INJECTION, SOLUTION INTRAMUSCULAR; INTRAVENOUS at 12:31

## 2018-11-14 NOTE — OP NOTE
OPERATIVE REPORT  PATIENT NAME: Latesha Esteban    :  1955  MRN: [id number]  Pt Location: [facility]OM 07    SURGERY DATE: 2018    Surgeon(s) and Role:     * Kecia Rojas MD - Primary    Preop Diagnosis:  Gross hematuria [R31 0]  Bladder mass [N32 89]    Post-Op Diagnosis Codes:     * Gross hematuria [R31 0]     * Bladder mass [N32 89]    Procedure(s) (LRB):  TRANSURETHRAL RESECTION OF BLADDER TUMOR (TURBT) (N/A)  INSTILLATION MITOMYCIN (N/A)    Specimen(s):  ID Type Source Tests Collected by Time Destination   1 :  Urine Urine, Cystoscopic CYTOLOGY, URINE Kecia Rojas MD 2018 1218    2 : prostate tissue Tissue Prostate TISSUE EXAM Kecia Rojas MD 2018 1225        Estimated Blood Loss:   Minimal    Drains:  Urethral Catheter Coude 18 Fr  (Active)   Number of days: 0       Anesthesia Type:   General    Operative Indications:  Gross hematuria [R31 0]  Bladder mass [N32 89]      Operative Findings:  1  Large (>5cm) tumor of the LEFT trigone    Complications:   None    Procedure and Technique:  Latesha Esteban is a 59y o -year-old male with a history of hematuria  Flexible cystoscopy in the office revealed tumor along the left silvia trigone with difficulty visualizing the left ureteral orifice  The remainder of the bladder was free and clear of bladder tumor  Risk and benefits of bladder biopsy and transurethral resection of the recurrent bladder tumor were discussed and reviewed  Informed consent was obtained  Patient was brought to the operating room on 2018  After the smooth induction of general anesthesia, the patient was placed in the dorsal lithotomy position  His genitalia was prepped and draped in a sterile fashion  Intravenous antibiotics were administered  A timeout was performed with all members of the operative team confirming the patient's identity and procedure to be performed  A 22 [place]ian rigid cystoscope with 30° lens was inserted    The bladder was thoroughly inspected  The left-sided trigone bladder tumor was identified  No other mucosal abnormalities or lesions were identified  The bilateral ureteral orifices were also visualized with clear efflux of urine  The cystoscope was exchanged for the continuous-flow resectoscope  There was difficulty passing the larger bore scope through the patient's bulbar urethra, this required serial dilation  Transurethral resection of the large (greater than 5 cm)-sized bladder tumor was performed  The tumor was sent as specimen  The left ureteral orifice was un involved in the resection or fulguration  The base of the tumor was fulgurated with electrocautery  Hemostasis was excellent  The bladder was left full the cystoscope was removed  A 18 [place] Bear catheter was inserted and the bladder was drained  40 mg of intravesical mitomycin was then instilled and 40 cc of sterile water into the bladder  The catheter was capped  Overall the patient tolerated the procedure well  The patient was extubated in the operating room and transferred to the PACU in stable condition at the conclusion of the case  Plan-patient's catheter will be removed in 1 hr    He will return in 2 weeks for pathology follow-up     I was present for the entire procedure    Patient Disposition:  PACU     SIGNATURE: Su Antonio MD  DATE: November 14, 2018  TIME: 12:32 PM

## 2018-11-14 NOTE — H&P
Office note reviewed from 10/15/18  No changes since this visit  Will proceed with cystoscopy and bladder tumor resection as planned  Consent previously signed 10/18/18

## 2018-11-14 NOTE — DISCHARGE INSTRUCTIONS
Transurethral Resection of Bladder Tumors   WHAT YOU NEED TO KNOW:   Transurethral resection of bladder tumors (TURBT) is surgery to remove one or more tumors from your bladder  DISCHARGE INSTRUCTIONS:   Medicines:   · Medicines  help decrease pain or prevent vomiting  · Take your medicine as directed  Contact your healthcare provider if you think your medicine is not helping or if you have side effects  Tell him or her if you are allergic to any medicine  Keep a list of the medicines, vitamins, and herbs you take  Include the amounts, and when and why you take them  Bring the list or the pill bottles to follow-up visits  Carry your medicine list with you in case of an emergency  Follow up with your healthcare provider as directed:  Write down your questions so you remember to ask them during your visits  Care for your Bear catheter:  Keep the bag below your waist  This will prevent urine from flowing back into your bladder and causing an infection or other problems  Also, keep the tube free of kinks so the urine will drain properly  Do not pull on the catheter  This can cause pain and bleeding and may cause the catheter to come out  Empty your urine drainage bag when it is ½ to ? full, or every 8 hours  If you have a smaller leg bag, empty it every 3 to 4 hours  Do the following when you empty your urine drainage bag:  · Hold the urine bag over the toilet or a large container  · Remove the drain spout from its sleeve at the bottom of the urine bag  Do not touch the tip of the drain spout  Open the slide valve on the spout  · Let the urine flow out of the urine bag into the toilet or container  Do not let the drainage tube touch anything  · Clean the end of the drain spout with alcohol when the bag is empty  Ask which cleaning solution is best to use  · Close the slide valve and put the drain spout into its sleeve at the bottom of the urine bag   Write down how much urine was in your bag if you were asked to keep a record  Contact your healthcare provider if:   · You have a fever or chills  · You have new or more blood in your urine  · You have nausea or are vomiting  · You have new or more pain when you urinate  · You are unable to control when you urinate  · You have questions or concerns about your condition or care  Seek care immediately or call 911 if:   · You have heavy bleeding from your urethra  · You start to urinate less often, very little, or not at all  · You have severe pain in your abdomen or pelvis  © 2017 [facility] Information is for End User's use only and may not be sold, redistributed or otherwise used for commercial purposes  All illustrations and images included in CareNotes® are the copyrighted property of [employer]  or [employer] [place]  The above information is an  only  It is not intended as medical advice for individual conditions or treatments  Talk to your doctor, nurse or pharmacist before following any medical regimen to see if it is safe and effective for you

## 2018-11-15 ENCOUNTER — TELEPHONE (OUTPATIENT)
Dept: UROLOGY | Facility: AMBULATORY SURGERY CENTER | Age: 63
End: 2018-11-15

## 2018-11-15 ENCOUNTER — OFFICE VISIT (OUTPATIENT)
Dept: UROLOGY | Facility: CLINIC | Age: 63
End: 2018-11-15
Payer: COMMERCIAL

## 2018-11-15 ENCOUNTER — TELEPHONE (OUTPATIENT)
Dept: UROLOGY | Facility: MEDICAL CENTER | Age: 63
End: 2018-11-15

## 2018-11-15 VITALS
WEIGHT: 192 LBS | BODY MASS INDEX: 28.44 KG/M2 | DIASTOLIC BLOOD PRESSURE: 70 MMHG | HEART RATE: 80 BPM | SYSTOLIC BLOOD PRESSURE: 170 MMHG | HEIGHT: 69 IN

## 2018-11-15 DIAGNOSIS — R33.8 ACUTE URINARY RETENTION: ICD-10-CM

## 2018-11-15 DIAGNOSIS — N32.89 BLADDER MASS: Primary | ICD-10-CM

## 2018-11-15 PROCEDURE — 99024 POSTOP FOLLOW-UP VISIT: CPT | Performed by: UROLOGY

## 2018-11-15 PROCEDURE — 51702 INSERT TEMP BLADDER CATH: CPT | Performed by: UROLOGY

## 2018-11-15 NOTE — PROGRESS NOTES
Patient presented postop  He has been urinating well overnight and this morning  Since 08/30 this morning, the patient has not been able to urinate  He is feeling a sense of fullness  Eighteen [place] Michelle coude catheter was placed  Patient had over a L output of peridium tinged urine  There was no blood or clots  I have recommended that the patient start the Flomax given to him yesterday after surgery  He has not used this medication yet  We will keep the catheter in place until Monday

## 2018-11-15 NOTE — TELEPHONE ENCOUNTER
Patient had surgery yesterday with Dr Tom Bruno and he said he is having a problem  He wants to speak with a nurse  Please call him back

## 2018-11-19 ENCOUNTER — OFFICE VISIT (OUTPATIENT)
Dept: UROLOGY | Facility: CLINIC | Age: 63
End: 2018-11-19
Payer: COMMERCIAL

## 2018-11-19 VITALS
DIASTOLIC BLOOD PRESSURE: 70 MMHG | WEIGHT: 192 LBS | BODY MASS INDEX: 28.44 KG/M2 | HEART RATE: 68 BPM | HEIGHT: 69 IN | RESPIRATION RATE: 20 BRPM | SYSTOLIC BLOOD PRESSURE: 130 MMHG

## 2018-11-19 DIAGNOSIS — R33.8 ACUTE URINARY RETENTION: ICD-10-CM

## 2018-11-19 DIAGNOSIS — C67.0 MALIGNANT NEOPLASM OF TRIGONE OF URINARY BLADDER (HCC): Primary | ICD-10-CM

## 2018-11-19 DIAGNOSIS — N32.89 BLADDER MASS: ICD-10-CM

## 2018-11-19 PROBLEM — R31.0 GROSS HEMATURIA: Status: RESOLVED | Noted: 2018-10-15 | Resolved: 2018-11-19

## 2018-11-19 PROCEDURE — 99213 OFFICE O/P EST LOW 20 MIN: CPT | Performed by: UROLOGY

## 2018-11-19 RX ORDER — TAMSULOSIN HYDROCHLORIDE 0.4 MG/1
0.4 CAPSULE ORAL
Qty: 30 CAPSULE | Refills: 3 | Status: SHIPPED | OUTPATIENT
Start: 2018-11-19 | End: 2019-10-10

## 2018-11-19 NOTE — PROGRESS NOTES
UROLOGY FOLLOW-UP NOTE     CHIEF COMPLAINT   Rachel Flores is a 59 y o  male with a complaint of   Chief Complaint   Patient presents with    Urinary Retention    Bladder Mass     S/P TURBT 11/14/2018    Bear Removal    Review Pathology Results       History of Present Illness:     59 y o  male who developed gross hematuria and some voiding dysfunction in January of 2017  The patient has had several occurrences over the last almost 2 years  He thought that he was passing stones although he has never had a history of stone disease in the past   Patient finally sought out care with a nephrologist who recommended return to our office  Patient was able to obtain a CT urogram prior to his visit  He has never been a cigarette or cigar smoker and does not use smokeless tobacco   There is a cancer history in his family but not renal or bladder  Patient underwent a bladder tumor resection on the 11/14/2018  This returns low-grade papillary urothelial carcinoma  The patient had difficulty urinating over the 24 hours after surgery  Bear catheter was placed in our office for greater than 1 L  This was not bloody urine  Patient returns today for Bear catheter removal and pathology discussion  Past Medical History:     Past Medical History:   Diagnosis Date    Back pain     Bladder cancer ([address] )     Gout     Herniated lumbar intervertebral disc     Tinnitus     Urinary retention     Wears glasses     Wears partial dentures     upper denture       PAST SURGICAL HISTORY:     Past Surgical History:   Procedure Laterality Date    CO CYSTOURETHROSCOPY,FULGUR <0 5 CM LESN N/A 11/14/2018    Procedure: TRANSURETHRAL RESECTION OF BLADDER TUMOR (TURBT);   Surgeon: Val Gamez MD;  Location: [facility];  Service: Urology    CO INSTILL ANTICANCER AGENT IN BLADDER N/A 11/14/2018    Procedure: Pili Souza;  Surgeon: Val Gamez MD;  Location[facility];  Service: Urology    TONSILLECTOMY      WISDOM TOOTH EXTRACTION         CURRENT MEDICATIONS:     Current Outpatient Prescriptions   Medication Sig Dispense Refill    acetaminophen (TYLENOL) 500 mg tablet Take 500 mg by mouth every 6 (six) hours as needed for mild pain      ALLOPURINOL PO Take 200 mg by mouth daily      lisinopril (ZESTRIL) 2 5 mg tablet Take 2 5 mg by mouth daily        Multiple Vitamin (MULTIVITAMIN) tablet Take 1 tablet by mouth daily      tamsulosin (FLOMAX) 0 4 mg Take 1 capsule (0 4 mg total) by mouth daily with dinner for 30 doses 30 capsule 0     Current Facility-Administered Medications   Medication Dose Route Frequency Provider Last Rate Last Dose    mitoMYcin (MUTAMYCIN) 40 mg in sterile water 40 mL bladder instillation  40 mg Bladder Instillation Once Verdell Gilford, MD           ALLERGIES:   No Known Allergies    SOCIAL HISTORY:     Social History     Social History    Marital status:      Spouse name: N/A    Number of children: N/A    Years of education: N/A     Social History Main Topics    Smoking status: Never Smoker    Smokeless tobacco: Never Used    Alcohol use Yes      Comment: very little    Drug use: No    Sexual activity: Not Asked     Other Topics Concern    None     Social History Narrative    None       SOCIAL HISTORY:     Family History   Problem Relation Age of Onset    Heart attack Father     Cancer Father        REVIEW OF SYSTEMS:     Review of Systems   Constitutional: Negative  Respiratory: Negative  Cardiovascular: Negative  Gastrointestinal: Negative  Genitourinary: Negative for hematuria  Musculoskeletal: Negative  Neurological: Negative  Psychiatric/Behavioral: Negative  PHYSICAL EXAM:     /70   Pulse 68   Resp 20   Ht 5' 9" (1 753 m)   Wt 87 1 kg (192 lb)   BMI 28 35 kg/m²     General:  Healthy appearing male in no acute distress  They have a normal affect    There is not appear to be any gross neurologic defects or abnormalities  HEENT:  Normocephalic, atraumatic  Neck is supple without any palpable lymphadenopathy  Cardiovascular:  Patient has normal palpable distal radial pulses  There is no significant peripheral edema  No JVD is noted  Respiratory:  Patient has unlabored respirations  There is no audible wheeze or rhonchi  Abdomen:  Abdomen is without surgical scars  Abdomen is soft and nontender  There is no tympany  Inguinal and umbilical hernia are not appreciated  Genitourinary:   Circumcised phallus, orthotopic meatus, testicles descended, Bear catheter in place draining clear urine  Musculoskeletal:  Patient does not have significant CVA tenderness in the  flank with palpation or percussion  They full range of motion in all 4 extremities  Strength in all 4 extremities appears congruent  Patient is able to ambulate without assistance or difficulty  Dermatologic:  Patient has no skin abnormalities or rashes  LABS:     CBC:   Lab Results   Component Value Date    WBC 9 11 2018    HGB 15 8 2018    HCT 46 0 2018    MCV 95 2018     2018       BMP:   Lab Results   Component Value Date    CALCIUM 9 1 2018    K 3 7 2018    CO2 26 2018     2018    BUN 14 2018    CREATININE 1 12 2018     Lab Results   Component Value Date    PSA 4 7 (H) 2018     IMAGIN/12/18  CT ABDOMEN AND PELVIS WITH AND WITHOUT IV CONTRAST     INDICATION:   R31 0: Gross hematuria      COMPARISON:  CT scan 7/10/2018      TECHNIQUE: Initial CT of the abdomen and pelvis was performed without intravenous contrast   Subsequent dynamic CT evaluation of the abdomen and pelvis was performed after the administration of intravenous contrast in both nephrographic and delayed   phases after the administration of intravenous contrast   Axial, sagittal, and coronal 2D reformatted images were created from the source data and submitted for interpretation     Radiation dose length product (DLP) for this visit:  1852 mGy-cm   This examination, like all CT scans performed in the [facility], was performed utilizing techniques to minimize radiation dose exposure, including the use of iterative   reconstruction and automated exposure control      IV Contrast:  100 mL of iohexol (OMNIPAQUE)  Enteric Contrast:  Enteric contrast was not administered      FINDINGS:     ABDOMEN     RIGHT KIDNEY AND URETER:  No solid renal mass  No detectable urothelial mass  No hydronephrosis or hydroureter  No urinary tract calculi  No perinephric collection      LEFT KIDNEY AND URETER:  No solid renal mass  No detectable urothelial mass  No hydronephrosis or hydroureter  No urinary tract calculi  No perinephric collection      URINARY BLADDER:  There is a 3 3 cm soft tissue mass in the left posterior lateral aspect of the bladder, adjacent to the ureteral orifice  No calculi         LOWER CHEST:  No clinically significant abnormality identified in the visualized lower chest      LIVER/BILIARY TREE:  Unremarkable      GALLBLADDER:  No calcified gallstones  No pericholecystic inflammatory change      SPLEEN:  Unremarkable      PANCREAS:  Unremarkable      ADRENAL GLANDS:  Unremarkable      STOMACH AND BOWEL:  There is colonic diverticulosis without evidence of acute diverticulitis      ABDOMINOPELVIC CAVITY:  No ascites  No free intraperitoneal air  No lymphadenopathy      VESSELS:  Unremarkable for patient's age      PELVIS     REPRODUCTIVE ORGANS:  Unremarkable for patient's age      APPENDIX: No findings to suggest appendicitis      ABDOMINAL WALL/INGUINAL REGIONS:  There is a small fat-containing umbilical hernia      OSSEOUS STRUCTURES:  No acute fracture or destructive osseous lesion      IMPRESSION:     3 3 cm mass in the left posterior lateral aspect of the urinary bladder  No evidence of metastatic disease      Colonic diverticulosis      PATHOLOGY: 11/14/18  Final Diagnosis   A  Urinary bladder, excision:             - Noninvasive low-grade papillary urothelial carcinoma  - Uninvolved muscularis propria is identified                 Interpretation performed at [facility] [address] [address]      ASSESSMENT:     59 y o  male with LGTa Bladder Cancer    PLAN:     The patient fortunately has noninvasive low-grade papillary urothelial carcinoma  I have recommended follow-up cystoscopy in 3 months  Patient will need a renal bladder ultrasound at that time both to assess continued emptying and to ensure that the left collecting system was unaffected  Ureteral orifice was away from the resection but I want to ensure the patient is not developing hydroureteronephrosis  Patient's catheter was removed today after postoperative retention  He knows that he should be voiding well within 6-8 hours  If not he will call our office  The patient will return to see me in 3 months with an ultrasound prior  This will be to ensure he continues to empty well

## 2018-12-31 ENCOUNTER — TELEPHONE (OUTPATIENT)
Dept: UROLOGY | Facility: MEDICAL CENTER | Age: 63
End: 2018-12-31

## 2018-12-31 NOTE — TELEPHONE ENCOUNTER
Patient s/p TURBT 11/14/2018 by Dr Nadege Medina at [facility]  Patient developed post op urinary retention, kramer placed for 1L of urine and started on Flomax  Patient called experiencing lightheadedness and retrograde ejaculations  Patient wishes to discontinue medicine  Patient was not experiencing any voiding difficulty prior to surgery and patient feels the Flomax has not improved his urinary stream     Counseled patient he may stop the Flomax  If patient sees any change with urination patient needs to notify office  Patient has appointment with Dr Nadege Medina at the end of February for his surveillance cystoscopy

## 2018-12-31 NOTE — TELEPHONE ENCOUNTER
Patient has questions on medication he has been taking for since his last visit on 11/19/2018  Patient is having side effects of dizziness upon rising temporary and ejaculations  Not sure if he should continue to use because side effects are persistent and he has used this medication now for 30 days as prescribe  Patient does not want to stop unless advised    Please call patient at [phone]

## 2019-02-19 ENCOUNTER — HOSPITAL ENCOUNTER (OUTPATIENT)
Dept: ULTRASOUND IMAGING | Facility: HOSPITAL | Age: 64
Discharge: HOME | End: 2019-02-19
Attending: UROLOGY
Payer: COMMERCIAL

## 2019-02-19 DIAGNOSIS — R33.8 ACUTE URINARY RETENTION: ICD-10-CM

## 2019-02-19 PROCEDURE — 76770 US EXAM ABDO BACK WALL COMP: CPT

## 2019-02-27 ENCOUNTER — PROCEDURE VISIT (OUTPATIENT)
Dept: UROLOGY | Facility: CLINIC | Age: 64
End: 2019-02-27
Payer: COMMERCIAL

## 2019-02-27 VITALS
SYSTOLIC BLOOD PRESSURE: 160 MMHG | WEIGHT: 205 LBS | HEIGHT: 69 IN | HEART RATE: 68 BPM | RESPIRATION RATE: 20 BRPM | DIASTOLIC BLOOD PRESSURE: 80 MMHG | BODY MASS INDEX: 30.36 KG/M2

## 2019-02-27 DIAGNOSIS — R33.8 ACUTE URINARY RETENTION: ICD-10-CM

## 2019-02-27 DIAGNOSIS — C67.0 MALIGNANT NEOPLASM OF TRIGONE OF URINARY BLADDER (HCC): Primary | ICD-10-CM

## 2019-02-27 LAB
SL AMB  POCT GLUCOSE, UA: NORMAL
SL AMB LEUKOCYTE ESTERASE,UA: NORMAL
SL AMB POCT BILIRUBIN,UA: NORMAL
SL AMB POCT BLOOD,UA: NORMAL
SL AMB POCT CLARITY,UA: CLEAR
SL AMB POCT COLOR,UA: YELLOW
SL AMB POCT KETONES,UA: NORMAL
SL AMB POCT NITRITE,UA: NORMAL
SL AMB POCT PH,UA: 5
SL AMB POCT SPECIFIC GRAVITY,UA: 1.01
SL AMB POCT URINE PROTEIN: NORMAL
SL AMB POCT UROBILINOGEN: NORMAL

## 2019-02-27 PROCEDURE — 52000 CYSTOURETHROSCOPY: CPT | Performed by: UROLOGY

## 2019-02-27 PROCEDURE — 81002 URINALYSIS NONAUTO W/O SCOPE: CPT | Performed by: UROLOGY

## 2019-02-27 NOTE — PROGRESS NOTES
UROLOGY FOLLOW-UP NOTE     CHIEF COMPLAINT   Sanju Graf is a 62 y o  male with a complaint of   Chief Complaint   Patient presents with    Bladder Cancer    Cystoscopy       History of Present Illness:     62 y o  male who developed gross hematuria and some voiding dysfunction in January of 2017  The patient has had several occurrences over the last almost 2 years  He thought that he was passing stones although he has never had a history of stone disease in the past   Patient finally sought out care with a nephrologist who recommended return to our office  Patient was able to obtain a CT urogram prior to his visit  He has never been a cigarette or cigar smoker and does not use smokeless tobacco   There is a cancer history in his family but not renal or bladder  Patient underwent a bladder tumor resection on the 11/14/2018  This returns low-grade papillary urothelial carcinoma  The patient had difficulty urinating over the 24 hours after surgery  Bear catheter was placed in our office for greater than 1 L  Patient was initially on Flomax but had side effects and stop the medication  He feels he is emptying very well  Returns in followup for bladder cancer  Past Medical History:     Past Medical History:   Diagnosis Date    Back pain     Bladder cancer ([address] )     Gout     Herniated lumbar intervertebral disc     Tinnitus     Urinary retention     Wears glasses     Wears partial dentures     upper denture       PAST SURGICAL HISTORY:     Past Surgical History:   Procedure Laterality Date    CYSTOSCOPY  02/27/2019    DE CYSTOURETHROSCOPY,FULGUR <0 5 CM LESN N/A 11/14/2018    Procedure: TRANSURETHRAL RESECTION OF BLADDER TUMOR (TURBT);   Surgeon: Kecia Rojas MD;  [facility];  Service: Urology    DE INSTILL ANTICANCER AGENT IN BLADDER N/A 11/14/2018    Procedure: Man Wood;  Surgeon: Kecia Rojas MD;  [facility];  Service: Urology  TONSILLECTOMY      WISDOM TOOTH EXTRACTION         CURRENT MEDICATIONS:     Current Outpatient Medications   Medication Sig Dispense Refill    acetaminophen (TYLENOL) 500 mg tablet Take 500 mg by mouth every 6 (six) hours as needed for mild pain      ALLOPURINOL PO Take 200 mg by mouth daily      lisinopril (ZESTRIL) 2 5 mg tablet Take 2 5 mg by mouth daily        Multiple Vitamin (MULTIVITAMIN) tablet Take 1 tablet by mouth daily      tamsulosin (FLOMAX) 0 4 mg Take 1 capsule (0 4 mg total) by mouth daily with dinner for 30 doses 30 capsule 3     Current Facility-Administered Medications   Medication Dose Route Frequency Provider Last Rate Last Dose    mitoMYcin (MUTAMYCIN) 40 mg in sterile water 40 mL bladder instillation  40 mg Bladder Instillation Once Orvan MD Geovanna           ALLERGIES:   No Known Allergies    SOCIAL HISTORY:     Social History     Socioeconomic History    Marital status:       Spouse name: None    Number of children: None    Years of education: None    Highest education level: None   Occupational History    None   Social Needs    Financial resource strain: None    Food insecurity:     Worry: None     Inability: None    Transportation needs:     Medical: None     Non-medical: None   Tobacco Use    Smoking status: Never Smoker    Smokeless tobacco: Never Used   Substance and Sexual Activity    Alcohol use: Yes     Comment: very little    Drug use: No    Sexual activity: None   Lifestyle    Physical activity:     Days per week: None     Minutes per session: None    Stress: None   Relationships    Social connections:     Talks on phone: None     Gets together: None     Attends Yazdanism service: None     Active member of club or organization: None     Attends meetings of clubs or organizations: None     Relationship status: None    Intimate partner violence:     Fear of current or ex partner: None     Emotionally abused: None     Physically abused: None Forced sexual activity: None   Other Topics Concern    None   Social History Narrative    None       SOCIAL HISTORY:     Family History   Problem Relation Age of Onset    Heart attack Father     Cancer Father        REVIEW OF SYSTEMS:     Review of Systems   Constitutional: Negative  Respiratory: Negative  Cardiovascular: Negative  Gastrointestinal: Negative  Genitourinary: Negative for difficulty urinating, frequency, hematuria and urgency  Musculoskeletal: Negative  Neurological: Negative  Psychiatric/Behavioral: Negative  PHYSICAL EXAM:     /80   Pulse 68   Resp 20   Ht 5' 9" (1 753 m)   Wt 93 kg (205 lb)   BMI 30 27 kg/m²     General:  Healthy appearing male in no acute distress  They have a normal affect  There is not appear to be any gross neurologic defects or abnormalities  HEENT:  Normocephalic, atraumatic  Neck is supple without any palpable lymphadenopathy  Cardiovascular:  Patient has normal palpable distal radial pulses  There is no significant peripheral edema  No JVD is noted  Respiratory:  Patient has unlabored respirations  There is no audible wheeze or rhonchi  Abdomen:  Abdomen is without surgical scars  Abdomen is soft and nontender  There is no tympany  Inguinal and umbilical hernia are not appreciated  Genitourinary:   Circumcised phallus, orthotopic meatus, testicles descended  Musculoskeletal:  Patient does not have significant CVA tenderness in the  flank with palpation or percussion  They full range of motion in all 4 extremities  Strength in all 4 extremities appears congruent  Patient is able to ambulate without assistance or difficulty  Dermatologic:  Patient has no skin abnormalities or rashes        LABS:     CBC:   Lab Results   Component Value Date    WBC 9 11 11/09/2018    HGB 15 8 11/09/2018    HCT 46 0 11/09/2018    MCV 95 11/09/2018     11/09/2018       BMP:   Lab Results   Component Value Date    CALCIUM 9 1 2018    K 3 7 2018    CO2 26 2018     2018    BUN 14 2018    CREATININE 1 12 2018     Lab Results   Component Value Date    PSA 4 7 (H) 2018     IMAGIN/19/19  RENAL ULTRASOUND     INDICATION:   R33 8: Other retention of urine  History of prior bladder cancer surgery     COMPARISON: CT renal protocol 2018     TECHNIQUE:   Ultrasound of the retroperitoneum was performed with a curvilinear transducer utilizing volumetric sweeps and still imaging techniques       FINDINGS:     KIDNEYS:  Symmetric and normal size  Right kidney:  11 1 x 5 1 cm  Left kidney:  10 8 x 6 8 cm      Right kidney  Normal echogenicity and contour  No suspicious masses detected  No hydronephrosis  No shadowing calculi  No perinephric fluid collections      Left kidney  Normal echogenicity and contour  No suspicious masses detected  No hydronephrosis  No shadowing calculi  No perinephric fluid collections      URETERS:  Nonvisualized      BLADDER:   Normally distended  No focal thickening or mass lesions  Bilateral ureteral jets detected         IMPRESSION:     Normal       18  CT ABDOMEN AND PELVIS WITH AND WITHOUT IV CONTRAST     INDICATION:   R31 0: Gross hematuria      COMPARISON:  CT scan 7/10/2018      TECHNIQUE: Initial CT of the abdomen and pelvis was performed without intravenous contrast   Subsequent dynamic CT evaluation of the abdomen and pelvis was performed after the administration of intravenous contrast in both nephrographic and delayed   phases after the administration of intravenous contrast   Axial, sagittal, and coronal 2D reformatted images were created from the source data and submitted for interpretation       Radiation dose length product (DLP) for this visit:  1852 mGy-cm     This examination, like all CT scans performed in the [facility], was performed utilizing techniques to minimize radiation dose exposure, including the use of iterative   reconstruction and automated exposure control      IV Contrast:  100 mL of iohexol (OMNIPAQUE)  Enteric Contrast:  Enteric contrast was not administered      FINDINGS:     ABDOMEN     RIGHT KIDNEY AND URETER:  No solid renal mass  No detectable urothelial mass  No hydronephrosis or hydroureter  No urinary tract calculi  No perinephric collection      LEFT KIDNEY AND URETER:  No solid renal mass  No detectable urothelial mass  No hydronephrosis or hydroureter  No urinary tract calculi  No perinephric collection      URINARY BLADDER:  There is a 3 3 cm soft tissue mass in the left posterior lateral aspect of the bladder, adjacent to the ureteral orifice  No calculi         LOWER CHEST:  No clinically significant abnormality identified in the visualized lower chest      LIVER/BILIARY TREE:  Unremarkable      GALLBLADDER:  No calcified gallstones  No pericholecystic inflammatory change      SPLEEN:  Unremarkable      PANCREAS:  Unremarkable      ADRENAL GLANDS:  Unremarkable      STOMACH AND BOWEL:  There is colonic diverticulosis without evidence of acute diverticulitis      ABDOMINOPELVIC CAVITY:  No ascites  No free intraperitoneal air  No lymphadenopathy      VESSELS:  Unremarkable for patient's age      PELVIS     REPRODUCTIVE ORGANS:  Unremarkable for patient's age      APPENDIX: No findings to suggest appendicitis      ABDOMINAL WALL/INGUINAL REGIONS:  There is a small fat-containing umbilical hernia      OSSEOUS STRUCTURES:  No acute fracture or destructive osseous lesion      IMPRESSION:     3 3 cm mass in the left posterior lateral aspect of the urinary bladder  No evidence of metastatic disease      Colonic diverticulosis  PATHOLOGY:     11/14/18  Final Diagnosis   A  Urinary bladder, excision:             - Noninvasive low-grade papillary urothelial carcinoma               - Uninvolved muscularis propria is identified                 Interpretation performed at , [facility] [address] [place] [id number]      ASSESSMENT:     62 y o  male with LGTa Bladder Cancer    PLAN:     The patient fortunately has noninvasive low-grade papillary urothelial carcinoma  I have recommended follow-up cystoscopy in 3 months for the first one year  There were no evidence of recurrence on today's procedure  Renal ultrasound does not show hydronephrosis in the patient is emptying his bladder well after his surgery  Patient will be due for updated prostate cancer screening in the summer of 2019

## 2019-02-27 NOTE — PROGRESS NOTES
Cystoscopy  Date/Time: 2/27/2019 8:45 AM  Performed by: Per Whaley MD  Authorized by: Per Whaley MD     Procedure details: cystoscopy    Patient tolerance: Patient tolerated the procedure well with no immediate complications    Additional Procedure Details:   Cystoscopy procedure note:    Risk and benefits of flexible cystoscopy were discussed  Informed consent was obtained  Urine dip was adequate for cystoscopy  The patient was placed in the supine position  His genitalia was prepped with Betadine and draped in a sterile fashion  Viscous 2% lidocaine jelly was instilled into the urethra and allowed dwell time for local anesthesia  Flexible cystoscopy was then performed using a 16F scope  The distal urethra and prostatic urethra were evaluated and were normal in course and caliber  Once inside the bladder, it was carefully inspected in a 360 degree fashion  There was no evidence of mucosal abnormalities, lesions, diverticula or stones  Lateral to the left ureteral orifice, there was the area of prior biopsy  This was without recurrence of tumor  Both ureteral orifices in their orthotopic location were visualized with clear efflux of urine  Retroflexion for evaluation of the bladder neck was normal      Overall this was a negative cystoscopy

## 2019-06-04 ENCOUNTER — PROCEDURE VISIT (OUTPATIENT)
Dept: UROLOGY | Facility: CLINIC | Age: 64
End: 2019-06-04
Payer: COMMERCIAL

## 2019-06-04 VITALS
HEART RATE: 72 BPM | WEIGHT: 208 LBS | HEIGHT: 69 IN | DIASTOLIC BLOOD PRESSURE: 80 MMHG | SYSTOLIC BLOOD PRESSURE: 170 MMHG | BODY MASS INDEX: 30.81 KG/M2

## 2019-06-04 DIAGNOSIS — C67.0 MALIGNANT NEOPLASM OF TRIGONE OF URINARY BLADDER (HCC): Primary | ICD-10-CM

## 2019-06-04 DIAGNOSIS — Z12.5 SCREENING FOR PROSTATE CANCER: ICD-10-CM

## 2019-06-04 PROCEDURE — 52000 CYSTOURETHROSCOPY: CPT | Performed by: UROLOGY

## 2019-06-04 PROCEDURE — 99213 OFFICE O/P EST LOW 20 MIN: CPT | Performed by: UROLOGY

## 2019-07-08 ENCOUNTER — APPOINTMENT (OUTPATIENT)
Dept: LAB | Facility: CLINIC | Age: 64
End: 2019-07-08
Payer: COMMERCIAL

## 2019-07-08 DIAGNOSIS — Z12.5 SCREENING FOR PROSTATE CANCER: ICD-10-CM

## 2019-07-08 LAB — PSA SERPL-MCNC: 3.9 NG/ML (ref 0–4)

## 2019-07-08 PROCEDURE — 36415 COLL VENOUS BLD VENIPUNCTURE: CPT

## 2019-07-08 PROCEDURE — G0103 PSA SCREENING: HCPCS

## 2019-07-11 ENCOUNTER — TELEPHONE (OUTPATIENT)
Dept: UROLOGY | Facility: CLINIC | Age: 64
End: 2019-07-11

## 2019-07-11 DIAGNOSIS — Z12.5 SCREENING FOR PROSTATE CANCER: Primary | ICD-10-CM

## 2019-07-11 NOTE — TELEPHONE ENCOUNTER
Contacted and spoke with patient to discuss his most recent PSA results  Informed patient his PSA result from 07/08/2019 is 3 9 down from last year's PSA 4 7  Asked patient to please obtain another PSA before his September appointment with Dr Юлия Buenrostro  Order is in the chart and patient to obtain after 08/26/2019  Patient voiced understanding of instructions

## 2019-07-11 NOTE — TELEPHONE ENCOUNTER
----- Message from Hussein Glover MD sent at 7/11/2019  9:14 AM EDT -----  Can we let patient know PSA is slightly down from prior  Would not rush to biopsy instead prefer to repeat PSA prior to 9 appt

## 2019-08-26 ENCOUNTER — APPOINTMENT (OUTPATIENT)
Dept: LAB | Facility: CLINIC | Age: 64
End: 2019-08-26
Payer: COMMERCIAL

## 2019-08-26 DIAGNOSIS — Z12.5 SCREENING FOR PROSTATE CANCER: ICD-10-CM

## 2019-08-26 PROCEDURE — 84153 ASSAY OF PSA TOTAL: CPT

## 2019-08-26 PROCEDURE — 84154 ASSAY OF PSA FREE: CPT

## 2019-08-26 PROCEDURE — 36415 COLL VENOUS BLD VENIPUNCTURE: CPT

## 2019-08-27 LAB
PSA FREE MFR SERPL: 10.5 %
PSA FREE SERPL-MCNC: 0.46 NG/ML
PSA SERPL-MCNC: 4.4 NG/ML (ref 0–4)

## 2019-09-05 ENCOUNTER — PROCEDURE VISIT (OUTPATIENT)
Dept: UROLOGY | Facility: CLINIC | Age: 64
End: 2019-09-05
Payer: COMMERCIAL

## 2019-09-05 VITALS
DIASTOLIC BLOOD PRESSURE: 90 MMHG | SYSTOLIC BLOOD PRESSURE: 180 MMHG | HEIGHT: 69 IN | WEIGHT: 205 LBS | HEART RATE: 80 BPM | BODY MASS INDEX: 30.36 KG/M2

## 2019-09-05 DIAGNOSIS — C67.9 MALIGNANT NEOPLASM OF URINARY BLADDER, UNSPECIFIED SITE (HCC): Primary | ICD-10-CM

## 2019-09-05 DIAGNOSIS — R39.89 ABNORMAL PROSTATE EXAM: ICD-10-CM

## 2019-09-05 DIAGNOSIS — R97.20 ELEVATED PSA: ICD-10-CM

## 2019-09-05 LAB
SL AMB  POCT GLUCOSE, UA: ABNORMAL
SL AMB LEUKOCYTE ESTERASE,UA: ABNORMAL
SL AMB POCT BILIRUBIN,UA: ABNORMAL
SL AMB POCT BLOOD,UA: ABNORMAL
SL AMB POCT CLARITY,UA: CLEAR
SL AMB POCT COLOR,UA: YELLOW
SL AMB POCT KETONES,UA: ABNORMAL
SL AMB POCT NITRITE,UA: ABNORMAL
SL AMB POCT PH,UA: 5
SL AMB POCT SPECIFIC GRAVITY,UA: 1.02
SL AMB POCT URINE PROTEIN: ABNORMAL
SL AMB POCT UROBILINOGEN: ABNORMAL

## 2019-09-05 PROCEDURE — 81002 URINALYSIS NONAUTO W/O SCOPE: CPT | Performed by: UROLOGY

## 2019-09-05 PROCEDURE — 99214 OFFICE O/P EST MOD 30 MIN: CPT | Performed by: UROLOGY

## 2019-09-05 NOTE — PROGRESS NOTES
UROLOGY FOLLOW-UP NOTE     CHIEF COMPLAINT   Beau Stokes is a 62 y o  male with a complaint of   Chief Complaint   Patient presents with    Cystoscopy       History of Present Illness:     62 y o  male who developed gross hematuria and some voiding dysfunction in January of 2017  The patient has had several occurrences over the last almost 2 years  He thought that he was passing stones although he has never had a history of stone disease in the past   Patient finally sought care with a nephrologist who recommended return to our office  Patient was able to obtain a CT urogram prior to his visit  He has never been a cigarette or cigar smoker and does not use smokeless tobacco   There is a cancer history in his family but not renal or bladder  Patient underwent a bladder tumor resection on the 11/14/2018  Pathology returned low-grade papillary urothelial carcinoma  The patient had difficulty urinating over the 24 hours after surgery  Bear catheter was placed in our office for greater than 1 L  Patient was initially on Flomax but had side effects and stopped the medication  Retention resolved and patient improved  Returns in followup for bladder cancer  Patient is doing well without any concerns or complaints  Continues CaP screening with oscillating PSA  Lab Results   Component Value Date    PSA 4 4 (H) 08/26/2019    PSA 3 9 07/08/2019    PSA 4 7 (H) 07/06/2018           Past Medical History:     Past Medical History:   Diagnosis Date    Back pain     Bladder cancer ([address] )     Gout     Herniated lumbar intervertebral disc     Tinnitus     Urinary retention     Wears glasses     Wears partial dentures     upper denture       PAST SURGICAL HISTORY:     Past Surgical History:   Procedure Laterality Date    CYSTOSCOPY  02/27/2019    MA CYSTOURETHROSCOPY,FULGUR <0 5 CM ZEV N/A 11/14/2018    Procedure: TRANSURETHRAL RESECTION OF BLADDER TUMOR (TURBT);   Surgeon: Delisa Hobbs MD; [facility];  Service: Urology    IN INSTILL ANTICANCER AGENT IN BLADDER N/A 11/14/2018    Procedure: INSTILLATION MITOMYCIN;  Surgeon: Jing Douglas MD;  [facility];  Service: Urology    TONSILLECTOMY      WISDOM TOOTH EXTRACTION         CURRENT MEDICATIONS:     Current Outpatient Medications   Medication Sig Dispense Refill    acetaminophen (TYLENOL) 500 mg tablet Take 500 mg by mouth every 6 (six) hours as needed for mild pain      ALLOPURINOL PO Take 200 mg by mouth daily      lisinopril (ZESTRIL) 2 5 mg tablet Take 2 5 mg by mouth daily        Multiple Vitamin (MULTIVITAMIN) tablet Take 1 tablet by mouth daily      tamsulosin (FLOMAX) 0 4 mg Take 1 capsule (0 4 mg total) by mouth daily with dinner for 30 doses 30 capsule 3     Current Facility-Administered Medications   Medication Dose Route Frequency Provider Last Rate Last Dose    mitoMYcin (MUTAMYCIN) 40 mg in sterile water 40 mL bladder instillation  40 mg Bladder Instillation Once Jing Douglas MD           ALLERGIES:   No Known Allergies    SOCIAL HISTORY:     Social History     Socioeconomic History    Marital status:       Spouse name: None    Number of children: None    Years of education: None    Highest education level: None   Occupational History    None   Social Needs    Financial resource strain: None    Food insecurity:     Worry: None     Inability: None    Transportation needs:     Medical: None     Non-medical: None   Tobacco Use    Smoking status: Never Smoker    Smokeless tobacco: Never Used   Substance and Sexual Activity    Alcohol use: Yes     Comment: very little    Drug use: No    Sexual activity: None   Lifestyle    Physical activity:     Days per week: None     Minutes per session: None    Stress: None   Relationships    Social connections:     Talks on phone: None     Gets together: None     Attends Yazidism service: None     Active member of club or organization: None     Attends meetings of clubs or organizations: None     Relationship status: None    Intimate partner violence:     Fear of current or ex partner: None     Emotionally abused: None     Physically abused: None     Forced sexual activity: None   Other Topics Concern    None   Social History Narrative    None       SOCIAL HISTORY:     Family History   Problem Relation Age of Onset    Heart attack Father     Cancer Father        REVIEW OF SYSTEMS:     Review of Systems   Constitutional: Negative  Respiratory: Negative  Cardiovascular: Negative  Gastrointestinal: Negative  Genitourinary: Negative for difficulty urinating, frequency, hematuria and urgency  Musculoskeletal: Negative  Skin: Negative  Neurological: Negative  Psychiatric/Behavioral: Negative  PHYSICAL EXAM:     BP (!) 180/90   Pulse 80   Ht 5' 9" (1 753 m)   Wt 93 kg (205 lb)   BMI 30 27 kg/m²     General:  Healthy appearing male in no acute distress  They have a normal affect  There is not appear to be any gross neurologic defects or abnormalities  HEENT:  Normocephalic, atraumatic  Neck is supple without any palpable lymphadenopathy  Cardiovascular:  Patient has normal palpable distal radial pulses  There is no significant peripheral edema  No JVD is noted  Respiratory:  Patient has unlabored respirations  There is no audible wheeze or rhonchi  Abdomen:  Abdomen is without surgical scars  Abdomen is soft and nontender  There is no tympany  Inguinal and umbilical hernia are not appreciated  Genitourinary:   Circumcised phallus, orthotopic meatus, testicles descended, ESME   Some mild fullness towards the right lateral base but no distinct induration or nodule    Musculoskeletal:  Patient does not have significant CVA tenderness in the  flank with palpation or percussion  They full range of motion in all 4 extremities  Strength in all 4 extremities appears congruent    Patient is able to ambulate without assistance or difficulty  Dermatologic:  Patient has no skin abnormalities or rashes  LABS:     CBC:   Lab Results   Component Value Date    WBC 9 11 2018    HGB 15 8 2018    HCT 46 0 2018    MCV 95 2018     2018       BMP:   Lab Results   Component Value Date    CALCIUM 9 1 2018    K 3 7 2018    CO2 26 2018     2018    BUN 14 2018    CREATININE 1 12 2018     Lab Results   Component Value Date    PSA 4 4 (H) 2019    PSA 3 9 2019    PSA 4 7 (H) 2018     IMAGIN/19/19  RENAL ULTRASOUND     INDICATION:   R33 8: Other retention of urine  History of prior bladder cancer surgery     COMPARISON: CT renal protocol 2018     TECHNIQUE:   Ultrasound of the retroperitoneum was performed with a curvilinear transducer utilizing volumetric sweeps and still imaging techniques       FINDINGS:     KIDNEYS:  Symmetric and normal size  Right kidney:  11 1 x 5 1 cm  Left kidney:  10 8 x 6 8 cm      Right kidney  Normal echogenicity and contour  No suspicious masses detected  No hydronephrosis  No shadowing calculi  No perinephric fluid collections      Left kidney  Normal echogenicity and contour  No suspicious masses detected  No hydronephrosis  No shadowing calculi  No perinephric fluid collections      URETERS:  Nonvisualized      BLADDER:   Normally distended  No focal thickening or mass lesions    Bilateral ureteral jets detected         IMPRESSION:     Normal       18  CT ABDOMEN AND PELVIS WITH AND WITHOUT IV CONTRAST     INDICATION:   R31 0: Gross hematuria      COMPARISON:  CT scan 7/10/2018      TECHNIQUE: Initial CT of the abdomen and pelvis was performed without intravenous contrast   Subsequent dynamic CT evaluation of the abdomen and pelvis was performed after the administration of intravenous contrast in both nephrographic and delayed   phases after the administration of intravenous contrast   Axial, sagittal, and coronal 2D reformatted images were created from the source data and submitted for interpretation       Radiation dose length product (DLP) for this visit:  1852 mGy-cm   This examination, like all CT scans performed in the [facility], was performed utilizing techniques to minimize radiation dose exposure, including the use of iterative   reconstruction and automated exposure control      IV Contrast:  100 mL of iohexol (OMNIPAQUE)  Enteric Contrast:  Enteric contrast was not administered      FINDINGS:     ABDOMEN     RIGHT KIDNEY AND URETER:  No solid renal mass  No detectable urothelial mass  No hydronephrosis or hydroureter  No urinary tract calculi  No perinephric collection      LEFT KIDNEY AND URETER:  No solid renal mass  No detectable urothelial mass  No hydronephrosis or hydroureter  No urinary tract calculi  No perinephric collection      URINARY BLADDER:  There is a 3 3 cm soft tissue mass in the left posterior lateral aspect of the bladder, adjacent to the ureteral orifice  No calculi         LOWER CHEST:  No clinically significant abnormality identified in the visualized lower chest      LIVER/BILIARY TREE:  Unremarkable      GALLBLADDER:  No calcified gallstones  No pericholecystic inflammatory change      SPLEEN:  Unremarkable      PANCREAS:  Unremarkable      ADRENAL GLANDS:  Unremarkable      STOMACH AND BOWEL:  There is colonic diverticulosis without evidence of acute diverticulitis      ABDOMINOPELVIC CAVITY:  No ascites  No free intraperitoneal air   No lymphadenopathy      VESSELS:  Unremarkable for patient's age      PELVIS     REPRODUCTIVE ORGANS:  Unremarkable for patient's age      APPENDIX: No findings to suggest appendicitis      ABDOMINAL WALL/INGUINAL REGIONS:  There is a small fat-containing umbilical hernia      OSSEOUS STRUCTURES:  No acute fracture or destructive osseous lesion      IMPRESSION:     3 3 cm mass in the left posterior lateral aspect of the urinary bladder  No evidence of metastatic disease      Colonic diverticulosis  PATHOLOGY:     11/14/18  Final Diagnosis   A  Urinary bladder, excision:             - Noninvasive low-grade papillary urothelial carcinoma  - Uninvolved muscularis propria is identified                 Interpretation performed at [address] [address]      ASSESSMENT:     62 y o  male with LGTa Bladder Cancer, oscillating PSA with asymmetric prostate exam     PLAN:     1  h/o LG Ta UC bladder, diagnosis 11/2018 -  Cystoscopy today deferred as patient will be under anesthesia for his transperineal biopsy  We will perform flexible cystoscopy at that time    2  CaP screening -   Given the previously mild elevation in his PSA and asymmetry on rectal exam, recommended prostate biopsy  Discussed transrectal and transperineal approaches  Discussed the different risks and benefits  Patient agrees to proceed with transperineal biopsy of the prostate under sedation in the operating room  Informed consent signed today

## 2019-09-30 ENCOUNTER — TRANSCRIBE ORDERS (OUTPATIENT)
Dept: ADMINISTRATIVE | Facility: HOSPITAL | Age: 64
End: 2019-09-30

## 2019-09-30 ENCOUNTER — APPOINTMENT (OUTPATIENT)
Dept: LAB | Facility: HOSPITAL | Age: 64
End: 2019-09-30
Attending: INTERNAL MEDICINE
Payer: COMMERCIAL

## 2019-09-30 ENCOUNTER — APPOINTMENT (OUTPATIENT)
Dept: LAB | Facility: HOSPITAL | Age: 64
End: 2019-09-30
Attending: UROLOGY
Payer: COMMERCIAL

## 2019-09-30 DIAGNOSIS — E31.9 POLYGLANDULAR DYSFUNCTION, UNSPECIFIED (HCC): ICD-10-CM

## 2019-09-30 DIAGNOSIS — C67.9 MALIGNANT NEOPLASM OF URINARY BLADDER, UNSPECIFIED SITE (HCC): ICD-10-CM

## 2019-09-30 DIAGNOSIS — R97.20 ELEVATED PSA: ICD-10-CM

## 2019-09-30 DIAGNOSIS — R39.89 ABNORMAL PROSTATE EXAM: ICD-10-CM

## 2019-09-30 DIAGNOSIS — E31.9 POLYGLANDULAR DYSFUNCTION, UNSPECIFIED (HCC): Primary | ICD-10-CM

## 2019-09-30 LAB
ALBUMIN SERPL BCP-MCNC: 3.9 G/DL (ref 3.5–5)
ALP SERPL-CCNC: 53 U/L (ref 46–116)
ALT SERPL W P-5'-P-CCNC: 33 U/L (ref 12–78)
ANION GAP SERPL CALCULATED.3IONS-SCNC: 7 MMOL/L (ref 4–13)
AST SERPL W P-5'-P-CCNC: 20 U/L (ref 5–45)
BASOPHILS # BLD AUTO: 0.07 THOUSANDS/ΜL (ref 0–0.1)
BASOPHILS NFR BLD AUTO: 1 % (ref 0–1)
BILIRUB SERPL-MCNC: 0.8 MG/DL (ref 0.2–1)
BILIRUB UR QL STRIP: NEGATIVE
BUN SERPL-MCNC: 16 MG/DL (ref 5–25)
CALCIUM SERPL-MCNC: 8.5 MG/DL (ref 8.3–10.1)
CHLORIDE SERPL-SCNC: 104 MMOL/L (ref 100–108)
CHOLEST SERPL-MCNC: 147 MG/DL (ref 50–200)
CLARITY UR: CLEAR
CO2 SERPL-SCNC: 28 MMOL/L (ref 21–32)
COLOR UR: COLORLESS
CREAT SERPL-MCNC: 1.11 MG/DL (ref 0.6–1.3)
CREAT UR-MCNC: 22.4 MG/DL
EOSINOPHIL # BLD AUTO: 0.09 THOUSAND/ΜL (ref 0–0.61)
EOSINOPHIL NFR BLD AUTO: 1 % (ref 0–6)
ERYTHROCYTE [DISTWIDTH] IN BLOOD BY AUTOMATED COUNT: 12.4 % (ref 11.6–15.1)
GFR SERPL CREATININE-BSD FRML MDRD: 70 ML/MIN/1.73SQ M
GLUCOSE P FAST SERPL-MCNC: 89 MG/DL (ref 65–99)
GLUCOSE UR STRIP-MCNC: NEGATIVE MG/DL
HCT VFR BLD AUTO: 47.3 % (ref 36.5–49.3)
HDLC SERPL-MCNC: 42 MG/DL (ref 40–60)
HGB BLD-MCNC: 16 G/DL (ref 12–17)
HGB UR QL STRIP.AUTO: NEGATIVE
IMM GRANULOCYTES # BLD AUTO: 0.03 THOUSAND/UL (ref 0–0.2)
IMM GRANULOCYTES NFR BLD AUTO: 0 % (ref 0–2)
KETONES UR STRIP-MCNC: NEGATIVE MG/DL
LDLC SERPL CALC-MCNC: 80 MG/DL (ref 0–100)
LEUKOCYTE ESTERASE UR QL STRIP: NEGATIVE
LYMPHOCYTES # BLD AUTO: 2.78 THOUSANDS/ΜL (ref 0.6–4.47)
LYMPHOCYTES NFR BLD AUTO: 38 % (ref 14–44)
MCH RBC QN AUTO: 32.5 PG (ref 26.8–34.3)
MCHC RBC AUTO-ENTMCNC: 33.8 G/DL (ref 31.4–37.4)
MCV RBC AUTO: 96 FL (ref 82–98)
MICROALBUMIN UR-MCNC: <5 MG/L (ref 0–20)
MICROALBUMIN/CREAT 24H UR: <22 MG/G CREATININE (ref 0–30)
MONOCYTES # BLD AUTO: 0.87 THOUSAND/ΜL (ref 0.17–1.22)
MONOCYTES NFR BLD AUTO: 12 % (ref 4–12)
NEUTROPHILS # BLD AUTO: 3.53 THOUSANDS/ΜL (ref 1.85–7.62)
NEUTS SEG NFR BLD AUTO: 48 % (ref 43–75)
NITRITE UR QL STRIP: NEGATIVE
NONHDLC SERPL-MCNC: 105 MG/DL
NRBC BLD AUTO-RTO: 0 /100 WBCS
PH UR STRIP.AUTO: 6.5 [PH]
PLATELET # BLD AUTO: 239 THOUSANDS/UL (ref 149–390)
PMV BLD AUTO: 9.4 FL (ref 8.9–12.7)
POTASSIUM SERPL-SCNC: 3.7 MMOL/L (ref 3.5–5.3)
PROT SERPL-MCNC: 7.4 G/DL (ref 6.4–8.2)
PROT UR STRIP-MCNC: NEGATIVE MG/DL
RBC # BLD AUTO: 4.92 MILLION/UL (ref 3.88–5.62)
SODIUM SERPL-SCNC: 139 MMOL/L (ref 136–145)
SP GR UR STRIP.AUTO: <=1.005 (ref 1–1.03)
TRIGL SERPL-MCNC: 125 MG/DL
URATE SERPL-MCNC: 5.2 MG/DL (ref 4.2–8)
UROBILINOGEN UR QL STRIP.AUTO: 0.2 E.U./DL
WBC # BLD AUTO: 7.37 THOUSAND/UL (ref 4.31–10.16)

## 2019-09-30 PROCEDURE — 82043 UR ALBUMIN QUANTITATIVE: CPT

## 2019-09-30 PROCEDURE — 36415 COLL VENOUS BLD VENIPUNCTURE: CPT

## 2019-09-30 PROCEDURE — 80061 LIPID PANEL: CPT

## 2019-09-30 PROCEDURE — 81003 URINALYSIS AUTO W/O SCOPE: CPT

## 2019-09-30 PROCEDURE — 80053 COMPREHEN METABOLIC PANEL: CPT

## 2019-09-30 PROCEDURE — 82570 ASSAY OF URINE CREATININE: CPT

## 2019-09-30 PROCEDURE — 84550 ASSAY OF BLOOD/URIC ACID: CPT

## 2019-09-30 PROCEDURE — 87086 URINE CULTURE/COLONY COUNT: CPT

## 2019-09-30 PROCEDURE — 85025 COMPLETE CBC W/AUTO DIFF WBC: CPT

## 2019-10-01 LAB — BACTERIA UR CULT: NORMAL

## 2019-10-03 ENCOUNTER — ANESTHESIA EVENT (OUTPATIENT)
Dept: PERIOP | Facility: AMBULARY SURGERY CENTER | Age: 64
End: 2019-10-03
Payer: COMMERCIAL

## 2019-10-09 DIAGNOSIS — Z01.818 PRE-OP TESTING: Primary | ICD-10-CM

## 2019-10-10 ENCOUNTER — HOSPITAL ENCOUNTER (OUTPATIENT)
Dept: NON INVASIVE DIAGNOSTICS | Facility: HOSPITAL | Age: 64
Discharge: HOME | End: 2019-10-10
Attending: UROLOGY
Payer: COMMERCIAL

## 2019-10-10 DIAGNOSIS — Z01.818 PRE-OP TESTING: ICD-10-CM

## 2019-10-10 PROCEDURE — 93005 ELECTROCARDIOGRAM TRACING: CPT

## 2019-10-10 NOTE — PRE-PROCEDURE INSTRUCTIONS
Pre-Surgery Instructions:   Medication Instructions    acetaminophen (TYLENOL) 500 mg tablet Instructed patient per Anesthesia Guidelines   ALLOPURINOL PO Instructed patient per Anesthesia Guidelines   lisinopril (ZESTRIL) 2 5 mg tablet Instructed patient per Anesthesia Guidelines  Pre op and bathing instructions reviewed

## 2019-10-11 ENCOUNTER — OFFICE VISIT (OUTPATIENT)
Dept: UROLOGY | Facility: CLINIC | Age: 64
End: 2019-10-11
Payer: COMMERCIAL

## 2019-10-11 VITALS
HEART RATE: 70 BPM | DIASTOLIC BLOOD PRESSURE: 88 MMHG | SYSTOLIC BLOOD PRESSURE: 138 MMHG | BODY MASS INDEX: 31.25 KG/M2 | WEIGHT: 211.64 LBS

## 2019-10-11 DIAGNOSIS — C67.0 MALIGNANT NEOPLASM OF TRIGONE OF URINARY BLADDER (HCC): Primary | ICD-10-CM

## 2019-10-11 DIAGNOSIS — R97.20 ELEVATED PSA: ICD-10-CM

## 2019-10-11 DIAGNOSIS — R39.89 ABNORMAL PROSTATE EXAM: ICD-10-CM

## 2019-10-11 LAB
ATRIAL RATE: 64 BPM
P AXIS: 63 DEGREES
PR INTERVAL: 200 MS
QRS AXIS: -32 DEGREES
QRSD INTERVAL: 88 MS
QT INTERVAL: 416 MS
QTC INTERVAL: 429 MS
T WAVE AXIS: 8 DEGREES
VENTRICULAR RATE: 64 BPM

## 2019-10-11 PROCEDURE — 99213 OFFICE O/P EST LOW 20 MIN: CPT | Performed by: PHYSICIAN ASSISTANT

## 2019-10-11 PROCEDURE — 93010 ELECTROCARDIOGRAM REPORT: CPT | Performed by: INTERNAL MEDICINE

## 2019-10-11 NOTE — PROGRESS NOTES
Pre-op visit  10/11/2019      Chief Complaint   Patient presents with    h&p pro-op         Assessment and Plan     62 y o  male managed by Dr Ronni López    1  Bladder cancer  - noninvasive low grade papillary urothelial carcinoma (TURBT 11/14/18)  - surveillance cystoscopies 2/27/19, 6/4/19 negative  - surveillance cystoscopy scheduled 10/15/2019 in OR    2  Elevated PSA and mildly abnormal ESME  - PSA 4 4 (8/26/19)  - transperineal prostate biopsy scheduled 10/15/2019 in OR    History and physical was performed for the patients upcoming cystoscopy, transperineal biopsy scheduled for 10/15/19 with Dr Ronni López at [facility]  All questions and concerns regarding surgery have been addressed and answered  Will proceed with surgery as planned  History of Present Illness  Iglesia Wiseman is a 62 y o  male here for history and physical prior to their upcoming surveillance cystoscopy and transperineal prostate biopsy for h/o bladder CA and new elevated PSA  The patient has had no overall changes in their health since their last visit  Completed PATs  Prior history of laryngeal spasm post anesthesia that was self-limited resolved quickly  No blood thinners  No allergies  Does not smoke  No lower urinary tract symptoms to report today  Review of Systems   Constitutional: Negative for activity change, appetite change, chills, fever and unexpected weight change  HENT: Negative  Respiratory: Negative  Negative for shortness of breath  Cardiovascular: Negative  Negative for chest pain  Gastrointestinal: Negative for abdominal pain, diarrhea, nausea and vomiting  Endocrine: Negative  Genitourinary: Negative for decreased urine volume, difficulty urinating, dysuria, flank pain, frequency, hematuria and urgency  Musculoskeletal: Negative for back pain and gait problem  Skin: Negative  Allergic/Immunologic: Negative  Neurological: Negative      Hematological: Negative for adenopathy  Does not bruise/bleed easily  Past Medical History  Past Medical History:   Diagnosis Date    Back pain     Bladder cancer ([address] )     Gout     Herniated lumbar intervertebral disc     Hypertension     Tinnitus     Urinary retention     Wears glasses     Wears partial dentures     upper denture       Past Social History  Past Surgical History:   Procedure Laterality Date    CYSTOSCOPY  02/27/2019    [place] CYSTOURETHROSCOPY,FULGUR <0 5 CM LESN N/A 11/14/2018    Procedure: TRANSURETHRAL RESECTION OF BLADDER TUMOR (TURBT); Surgeon: Armond Shin MD;  [facility];  Service: Urology    [place] INSTILL ANTICANCER AGENT IN BLADDER N/A 11/14/2018    Procedure: Lg Bear;  Surgeon: Armond Shin MD;  [facility];  Service: Urology    TONSILLECTOMY      WISDOM TOOTH EXTRACTION         Past Family History  Family History   Problem Relation Age of Onset    Heart attack Father     Cancer Father        Past Social history  Social History     Socioeconomic History    Marital status:       Spouse name: Not on file    Number of children: Not on file    Years of education: Not on file    Highest education level: Not on file   Occupational History    Not on file   Social Needs    Financial resource strain: Not on file    Food insecurity:     Worry: Not on file     Inability: Not on file    Transportation needs:     Medical: Not on file     Non-medical: Not on file   Tobacco Use    Smoking status: Never Smoker    Smokeless tobacco: Never Used   Substance and Sexual Activity    Alcohol use: Yes     Frequency: 2-4 times a month     Drinks per session: 10 or more     Binge frequency: Weekly     Comment: liquor    Drug use: No    Sexual activity: Not on file   Lifestyle    Physical activity:     Days per week: Not on file     Minutes per session: Not on file    Stress: Not on file   Relationships    Social connections:     Talks on phone: Not on file     Gets together: Not on file     Attends Shinto service: Not on file     Active member of club or organization: Not on file     Attends meetings of clubs or organizations: Not on file     Relationship status: Not on file    Intimate partner violence:     Fear of current or ex partner: Not on file     Emotionally abused: Not on file     Physically abused: Not on file     Forced sexual activity: Not on file   Other Topics Concern    Not on file   Social History Narrative    Not on file       Current Medications  Current Outpatient Medications   Medication Sig Dispense Refill    acetaminophen (TYLENOL) 500 mg tablet Take 500 mg by mouth every 6 (six) hours as needed for mild pain      ALLOPURINOL PO Take 200 mg by mouth daily in the early morning       lisinopril (ZESTRIL) 2 5 mg tablet Take 2 5 mg by mouth daily in the early morning        Current Facility-Administered Medications   Medication Dose Route Frequency Provider Last Rate Last Dose    mitoMYcin (MUTAMYCIN) 40 mg in sterile water 40 mL bladder instillation  40 mg Bladder Instillation Once Janeth Richards MD           Allergies  No Known Allergies      Past Medical History, Social History, Family History, medications and allergies were reviewed  Vitals  Vitals:    10/11/19 1019   BP: 138/88   Pulse: 70   Weight: 96 kg (211 lb 10 3 oz)         Physical Exam  General: Well appearing, no distress, appears stated age  HEENT:  Normocephalic, atraumatic  Conjunctiva clear, PERRL  Oropharynx clear  +dentures  Moist mucous membranes  Neck supple without palpable lymhadenopathy  Cardiovascular: Heart regular rate and rhythm  Radial pulse +2 bilaterally  No extremity edema  Respiratory: Lungs are clear to auscultation bilaterally without wheeze or rhonchi, respirations are non-labored and symmetrical   Abdomen:  Soft nontender without tympany without hernia  No suprapubic or CVA tenderness    Musculoskeletal:  FROM x 4 extremities with good equal strength  Neuro: No gross neurologic defect or abnormality  Steady unassisted gait   Speech and affect normal   Dermatologic: skin warm, dry; no rash erythema or ecchymosis

## 2019-10-11 NOTE — H&P (VIEW-ONLY)
Pre-op visit  10/11/2019      Chief Complaint   Patient presents with    h&p pro-op         Assessment and Plan     62 y o  male managed by Dr Ariel Wu    1  Bladder cancer  - noninvasive low grade papillary urothelial carcinoma (TURBT 11/14/18)  - surveillance cystoscopies 2/27/19, 6/4/19 negative  - surveillance cystoscopy scheduled 10/15/2019 in OR    2  Elevated PSA and mildly abnormal ESME  - PSA 4 4 (8/26/19)  - transperineal prostate biopsy scheduled 10/15/2019 in OR    History and physical was performed for the patients upcoming cystoscopy, transperineal biopsy scheduled for 10/15/19 with Dr Ariel Wu at [facility]  All questions and concerns regarding surgery have been addressed and answered  Will proceed with surgery as planned  History of Present Illness  Alicia Cruz is a 62 y o  male here for history and physical prior to their upcoming surveillance cystoscopy and transperineal prostate biopsy for h/o bladder CA and new elevated PSA  The patient has had no overall changes in their health since their last visit  Completed PATs  Prior history of laryngeal spasm post anesthesia that was self-limited resolved quickly  No blood thinners  No allergies  Does not smoke  No lower urinary tract symptoms to report today  Review of Systems   Constitutional: Negative for activity change, appetite change, chills, fever and unexpected weight change  HENT: Negative  Respiratory: Negative  Negative for shortness of breath  Cardiovascular: Negative  Negative for chest pain  Gastrointestinal: Negative for abdominal pain, diarrhea, nausea and vomiting  Endocrine: Negative  Genitourinary: Negative for decreased urine volume, difficulty urinating, dysuria, flank pain, frequency, hematuria and urgency  Musculoskeletal: Negative for back pain and gait problem  Skin: Negative  Allergic/Immunologic: Negative  Neurological: Negative      Hematological: Negative for adenopathy  Does not bruise/bleed easily  Past Medical History  Past Medical History:   Diagnosis Date    Back pain     Bladder cancer ([address] )     Gout     Herniated lumbar intervertebral disc     Hypertension     Tinnitus     Urinary retention     Wears glasses     Wears partial dentures     upper denture       Past Social History  Past Surgical History:   Procedure Laterality Date    CYSTOSCOPY  02/27/2019    [place] CYSTOURETHROSCOPY,FULGUR <0 5 CM LESN N/A 11/14/2018    Procedure: TRANSURETHRAL RESECTION OF BLADDER TUMOR (TURBT); Surgeon: Janeth Richards MD;  [facility];  Service: Urology    [place] INSTILL ANTICANCER AGENT IN BLADDER N/A 11/14/2018    Procedure: Marissa Berrios;  Surgeon: Janeth Richards MD;  [facility];  Service: Urology    TONSILLECTOMY      WISDOM TOOTH EXTRACTION         Past Family History  Family History   Problem Relation Age of Onset    Heart attack Father     Cancer Father        Past Social history  Social History     Socioeconomic History    Marital status:       Spouse name: Not on file    Number of children: Not on file    Years of education: Not on file    Highest education level: Not on file   Occupational History    Not on file   Social Needs    Financial resource strain: Not on file    Food insecurity:     Worry: Not on file     Inability: Not on file    Transportation needs:     Medical: Not on file     Non-medical: Not on file   Tobacco Use    Smoking status: Never Smoker    Smokeless tobacco: Never Used   Substance and Sexual Activity    Alcohol use: Yes     Frequency: 2-4 times a month     Drinks per session: 10 or more     Binge frequency: Weekly     Comment: liquor    Drug use: No    Sexual activity: Not on file   Lifestyle    Physical activity:     Days per week: Not on file     Minutes per session: Not on file    Stress: Not on file   Relationships    Social connections:     Talks on phone: Not on file     Gets together: Not on file     Attends Gnosticist service: Not on file     Active member of club or organization: Not on file     Attends meetings of clubs or organizations: Not on file     Relationship status: Not on file    Intimate partner violence:     Fear of current or ex partner: Not on file     Emotionally abused: Not on file     Physically abused: Not on file     Forced sexual activity: Not on file   Other Topics Concern    Not on file   Social History Narrative    Not on file       Current Medications  Current Outpatient Medications   Medication Sig Dispense Refill    acetaminophen (TYLENOL) 500 mg tablet Take 500 mg by mouth every 6 (six) hours as needed for mild pain      ALLOPURINOL PO Take 200 mg by mouth daily in the early morning       lisinopril (ZESTRIL) 2 5 mg tablet Take 2 5 mg by mouth daily in the early morning        Current Facility-Administered Medications   Medication Dose Route Frequency Provider Last Rate Last Dose    mitoMYcin (MUTAMYCIN) 40 mg in sterile water 40 mL bladder instillation  40 mg Bladder Instillation Once Mirna MD Eric           Allergies  No Known Allergies      Past Medical History, Social History, Family History, medications and allergies were reviewed  Vitals  Vitals:    10/11/19 1019   BP: 138/88   Pulse: 70   Weight: 96 kg (211 lb 10 3 oz)         Physical Exam  General: Well appearing, no distress, appears stated age  HEENT:  Normocephalic, atraumatic  Conjunctiva clear, PERRL  Oropharynx clear  +dentures  Moist mucous membranes  Neck supple without palpable lymhadenopathy  Cardiovascular: Heart regular rate and rhythm  Radial pulse +2 bilaterally  No extremity edema  Respiratory: Lungs are clear to auscultation bilaterally without wheeze or rhonchi, respirations are non-labored and symmetrical   Abdomen:  Soft nontender without tympany without hernia  No suprapubic or CVA tenderness    Musculoskeletal:  FROM x 4 extremities with good equal strength  Neuro: No gross neurologic defect or abnormality  Steady unassisted gait   Speech and affect normal   Dermatologic: skin warm, dry; no rash erythema or ecchymosis

## 2019-10-15 ENCOUNTER — HOSPITAL ENCOUNTER (OUTPATIENT)
Facility: AMBULARY SURGERY CENTER | Age: 64
Setting detail: OUTPATIENT SURGERY
Discharge: HOME | End: 2019-10-15
Attending: UROLOGY | Admitting: UROLOGY
Payer: COMMERCIAL

## 2019-10-15 ENCOUNTER — ANESTHESIA (OUTPATIENT)
Dept: PERIOP | Facility: AMBULARY SURGERY CENTER | Age: 64
End: 2019-10-15
Payer: COMMERCIAL

## 2019-10-15 VITALS
OXYGEN SATURATION: 100 % | HEART RATE: 62 BPM | DIASTOLIC BLOOD PRESSURE: 72 MMHG | RESPIRATION RATE: 20 BRPM | SYSTOLIC BLOOD PRESSURE: 156 MMHG | BODY MASS INDEX: 30.07 KG/M2 | WEIGHT: 203 LBS | HEIGHT: 69 IN | TEMPERATURE: 97.5 F

## 2019-10-15 DIAGNOSIS — C67.9 MALIGNANT NEOPLASM OF URINARY BLADDER, UNSPECIFIED SITE (HCC): ICD-10-CM

## 2019-10-15 DIAGNOSIS — R39.89 ABNORMAL PROSTATE EXAM: ICD-10-CM

## 2019-10-15 DIAGNOSIS — R97.20 ELEVATED PSA: ICD-10-CM

## 2019-10-15 PROCEDURE — G0416 PROSTATE BIOPSY, ANY MTHD: HCPCS | Performed by: PATHOLOGY

## 2019-10-15 PROCEDURE — 55700 PR BIOPSY OF PROSTATE,NEEDLE/PUNCH: CPT | Performed by: UROLOGY

## 2019-10-15 RX ORDER — PROPOFOL 10 MG/ML
INJECTION, EMULSION INTRAVENOUS AS NEEDED
Status: DISCONTINUED | OUTPATIENT
Start: 2019-10-15 | End: 2019-10-15 | Stop reason: SURG

## 2019-10-15 RX ORDER — LIDOCAINE HYDROCHLORIDE 10 MG/ML
0.5 INJECTION, SOLUTION EPIDURAL; INFILTRATION; INTRACAUDAL; PERINEURAL ONCE AS NEEDED
Status: DISCONTINUED | OUTPATIENT
Start: 2019-10-15 | End: 2019-10-15 | Stop reason: HOSPADM

## 2019-10-15 RX ORDER — FENTANYL CITRATE/PF 50 MCG/ML
25 SYRINGE (ML) INJECTION
Status: DISCONTINUED | OUTPATIENT
Start: 2019-10-15 | End: 2019-10-15 | Stop reason: HOSPADM

## 2019-10-15 RX ORDER — TAMSULOSIN HYDROCHLORIDE 0.4 MG/1
0.4 CAPSULE ORAL ONCE
Status: DISCONTINUED | OUTPATIENT
Start: 2019-10-15 | End: 2019-10-15 | Stop reason: HOSPADM

## 2019-10-15 RX ORDER — LIDOCAINE HYDROCHLORIDE AND EPINEPHRINE 10; 10 MG/ML; UG/ML
INJECTION, SOLUTION INFILTRATION; PERINEURAL AS NEEDED
Status: DISCONTINUED | OUTPATIENT
Start: 2019-10-15 | End: 2019-10-15 | Stop reason: HOSPADM

## 2019-10-15 RX ORDER — SODIUM CHLORIDE, SODIUM LACTATE, POTASSIUM CHLORIDE, CALCIUM CHLORIDE 600; 310; 30; 20 MG/100ML; MG/100ML; MG/100ML; MG/100ML
125 INJECTION, SOLUTION INTRAVENOUS CONTINUOUS
Status: DISCONTINUED | OUTPATIENT
Start: 2019-10-15 | End: 2019-10-15 | Stop reason: HOSPADM

## 2019-10-15 RX ORDER — ONDANSETRON 2 MG/ML
INJECTION INTRAMUSCULAR; INTRAVENOUS AS NEEDED
Status: DISCONTINUED | OUTPATIENT
Start: 2019-10-15 | End: 2019-10-15 | Stop reason: SURG

## 2019-10-15 RX ORDER — TRAMADOL HYDROCHLORIDE 50 MG/1
50 TABLET ORAL EVERY 6 HOURS PRN
Qty: 5 TABLET | Refills: 0 | Status: SHIPPED | OUTPATIENT
Start: 2019-10-15 | End: 2019-10-20

## 2019-10-15 RX ORDER — DEXAMETHASONE SODIUM PHOSPHATE 10 MG/ML
INJECTION, SOLUTION INTRAMUSCULAR; INTRAVENOUS AS NEEDED
Status: DISCONTINUED | OUTPATIENT
Start: 2019-10-15 | End: 2019-10-15 | Stop reason: SURG

## 2019-10-15 RX ORDER — LIDOCAINE HYDROCHLORIDE 10 MG/ML
INJECTION, SOLUTION INFILTRATION; PERINEURAL AS NEEDED
Status: DISCONTINUED | OUTPATIENT
Start: 2019-10-15 | End: 2019-10-15 | Stop reason: SURG

## 2019-10-15 RX ORDER — MAGNESIUM HYDROXIDE 1200 MG/15ML
LIQUID ORAL AS NEEDED
Status: DISCONTINUED | OUTPATIENT
Start: 2019-10-15 | End: 2019-10-15 | Stop reason: HOSPADM

## 2019-10-15 RX ORDER — CEFAZOLIN SODIUM 2 G/50ML
2000 SOLUTION INTRAVENOUS ONCE
Status: COMPLETED | OUTPATIENT
Start: 2019-10-15 | End: 2019-10-15

## 2019-10-15 RX ORDER — ONDANSETRON 2 MG/ML
4 INJECTION INTRAMUSCULAR; INTRAVENOUS ONCE AS NEEDED
Status: DISCONTINUED | OUTPATIENT
Start: 2019-10-15 | End: 2019-10-15 | Stop reason: HOSPADM

## 2019-10-15 RX ADMIN — DEXAMETHASONE SODIUM PHOSPHATE 4 MG: 10 INJECTION, SOLUTION INTRAMUSCULAR; INTRAVENOUS at 14:40

## 2019-10-15 RX ADMIN — ONDANSETRON 4 MG: 2 INJECTION INTRAMUSCULAR; INTRAVENOUS at 14:40

## 2019-10-15 RX ADMIN — LIDOCAINE HYDROCHLORIDE 50 MG: 10 INJECTION, SOLUTION INFILTRATION; PERINEURAL at 14:33

## 2019-10-15 RX ADMIN — PROPOFOL 200 MG: 10 INJECTION, EMULSION INTRAVENOUS at 14:34

## 2019-10-15 RX ADMIN — CEFAZOLIN SODIUM 2000 MG: 2 SOLUTION INTRAVENOUS at 14:39

## 2019-10-15 RX ADMIN — SODIUM CHLORIDE, SODIUM LACTATE, POTASSIUM CHLORIDE, AND CALCIUM CHLORIDE: .6; .31; .03; .02 INJECTION, SOLUTION INTRAVENOUS at 14:10

## 2019-10-15 NOTE — ANESTHESIA PREPROCEDURE EVALUATION
Review of Systems/Medical History  Patient summary reviewed  Chart reviewed  No history of anesthetic complications     Cardiovascular  Exercise tolerance (METS): >4,  Hypertension controlled, No angina , No SCHNEIDER,    Pulmonary  Negative pulmonary ROS Not a smoker , No shortness of breath, No recent URI ,        GI/Hepatic  Negative GI/hepatic ROS          Genitourinary malignancy Bladder cancer, Prostatic disorder,        Endo/Other    Obesity    GYN  Negative gynecology ROS          Hematology  Negative hematology ROS      Musculoskeletal  Gout,   Arthritis     Neurology  Negative neurology ROS      Psychology   Negative psychology ROS              Physical Exam    Airway    Mallampati score: I  TM Distance: >3 FB  Neck ROM: full     Dental   Comment: Denies loose/chipped teeth, upper dentures,     Cardiovascular  Rhythm: regular, Rate: normal, Cardiovascular exam normal    Pulmonary  Pulmonary exam normal Breath sounds clear to auscultation,     Other Findings        Anesthesia Plan  ASA Score- 2     Anesthesia Type- general with ASA Monitors  Additional Monitors:   Airway Plan: LMA  Plan Factors-Patient not instructed to abstain from smoking on day of procedure  Patient did not smoke on day of surgery  Induction- intravenous  Postoperative Plan- Plan for postoperative opioid use  Planned trial extubation    Informed Consent- Anesthetic plan and risks discussed with patient  I personally reviewed this patient with the CRNA  Discussed and agreed on the Anesthesia Plan with the CRNA  Jackson Becerril

## 2019-10-15 NOTE — OP NOTE
OPERATIVE REPORT  PATIENT NAME: Javon Carter    :  1955  MRN: [id number]  Pt Location: [facility]    SURGERY DATE: 10/15/2019    Surgeon(s) and Role:     * Chen Mcginnis MD - Primary     * Mark Basilio MD - Observing    Preop Diagnosis:  Malignant neoplasm of urinary bladder, unspecified site [facility]) [C67 9]  Abnormal prostate exam [R39 89]  Elevated PSA [R97 20]    Post-Op Diagnosis Codes:     * Malignant neoplasm of urinary bladder, unspecified site ([facility] [address] ) [C67 9]     * Abnormal prostate exam [R39 89]     * Elevated PSA [R97 20]    Procedure(s) (LRB):  TRANPERINEAL NEEDLE BIOPSY PROSTATE, CYSTOSCOPY (N/A)    Specimen(s):  ID Type Source Tests Collected by Time Destination   1 : RIGHT POSTERIOR MEDIAL Tissue Prostate TISSUE EXAM Chen Mcginnis MD 10/15/2019 1503    2 : RIGHT POSTERIOR LATERAL Tissue Prostate TISSUE EXAM Chen Mcginnis MD 10/15/2019 1503    3 : RIGHT ANTERIOR LATERAL Tissue Prostate TISSUE EXAM Chen Mcginnis MD 10/15/2019 1504    4 : RIGHT ANTERIOR MEDIAL Tissue Prostate TISSUE EXAM Chen Mcginnis MD 10/15/2019 1504    5 : RIGHT BASE Tissue Prostate TISSUE EXAM Chen Mcginnis MD 10/15/2019 1504    6 : LEFT POSTERIOR MEDIAL Tissue Prostate TISSUE EXAM Chen Mcginnis MD 10/15/2019 1504    7 : LEFT POSTERIOR LATERAL Tissue Prostate TISSUE EXAM Chen Mcginnis MD 10/15/2019 1504    8 : LEFT ANTERIOR LATERAL Tissue Prostate TISSUE EXAM Chen Mcginnis MD 10/15/2019 1504    9 : LEFT ANTERIOR MEDIAL Tissue Prostate TISSUE EXAM Chen Mcginnis MD 10/15/2019 1504    10 : LEFT BASE Tissue Prostate TISSUE EXAM Chen Mcginnis MD 10/15/2019 1504        Estimated Blood Loss:   Minimal    Drains:  Urethral Catheter Latex 16 Fr   (Active)   Number of days: 0       Anesthesia Type:   Choice    Operative Indications:  [facility]align[facility] urinary bladder[facility] [C67 9]  Abnormal prostate exam [R39 89]  Elevated PSA [R97 20]      Operative Findings:  1  Mild coaptation of the bilateral prostatic urethral lobes, no large pedunculated median lobe of the prostate, minimally elevated bladder neck  2  Normal cystourethroscopy  3  Prostate measured 41 7 mm in with, 24 4 mm in height, 41 1 mm in length (97G)    Complications:   None    Procedure and Technique:  Lian Metz is a 62 y o  male with a history of low-grade papillary urothelial carcinoma  Patient was found to have elevation in his PSA with a slightly abnormal prostate exam       The patient was counseled regarding their options and ultimately opted to proceed with transperineal prostate biopsy  Risks and benefits of the procedure were described and the patient signed an informed consent  On 10/15/2019, the patient proceeded to the operating room  They were laid supine on the operating room table and a pre-procedure time out was performed with all parties present and in agreement of the procedure planned and laterality  Patient received intravenous antibiotics in the form of and 7 and sequential compression devices were placed on bilateral lower extremities  They were then induced with a general LMA anesthetic  He was placed in dorsal lithotomy with care to pad all pressure points  His perineum and genitalia were prepped with a ChloraPrep solution  A 22 Kazakh cystoscope sheath and 30 degree lens were entered into the patient's urethra  He had a mild bulbar urethral stricture which was easy to pass the scope through  The patient had bilobar coaptation of the prostate and a mildly elevated bladder neck  There was no pedunculated median lobe  Pan cystourethroscopy was performed at 360 degree fashion  Ureteral orifices were orthotopic in location  There did not appear to be any recurrence of the patient's low-grade papillary bladder cancer  Cystoscope was removed    A 16 Kazakh catheter was placed into the patient's bladder for the duration the prostate biopsy procedure  Patient was left in dorsal lithotomy  Perineum was prepared by shaving and taping the scrotum out of the field  Sterile Iodoband was placed over the perineum above the rectum  Side fire biplanar transrectal ultrasound was performed and measurements of the prostate gland were taken as above  In the midportion of the left and right prostate, a juan was denoted in the perineal skin  Skin wheal was elevated with 5 cc of 1% lidocaine plain on either side  The PrecisionPoint device was then introduced into the left perineal subcutaneous tissue  We visualized the tip of the needle  Spinal needle was introduced through the subcutaneous fat and into the pelvic floor muscle where a perineal pudendal block was performed with additional 5 cc of 1% lidocaine  This was repeated on the patient's right side  On the right side of the prostate, we performed serial biopsies of the right posterior medial peripheral zone, right posterior lateral peripheral zone and moving up the anterior horn to the right anterior lateral and right anteromedial fibromuscular zone  Separate specimen was taken from the right-sided prostate base  Several areas had multiple cores taken  The PrecisionPoint device was repositioned into the left perineal stab  The biopsies were undertaken in the same fashion on the left side  Left posterior medial, left posterior lateral, left anterior lateral, left anteromedial and left base  The transplant rectal ultrasound probe was removed  The eye band was retracted and there was some urethral bleeding  A 16 Nepali silicone catheter was placed per urethra and the urine was noted to be clear  Some gentle pressure was placed on the perineum for approximately 5 minutes  At the completion of the procedure, the patient was taken out of dorsal lithotomy, extubated and transferred to PACU in good condition        I was present for the entire procedure    Patient Disposition:  PACU     SIGNATURE: Michael Sheehan MD  DATE: October 15, 2019  TIME: 3:36 PM

## 2019-10-15 NOTE — INTERVAL H&P NOTE
H&P reviewed  After examining the patient I find no changes in the patients condition since the H&P had been written      Vitals:    10/15/19 1220   BP: (!) 177/82   Pulse: 66   Resp: 18   Temp: 97 9 °F (36 6 °C)   SpO2: 99%

## 2019-10-15 NOTE — DISCHARGE INSTRUCTIONS
Prostate Biopsy   WHAT YOU NEED TO KNOW:   What do I need to know about a prostate biopsy? A prostate biopsy is a procedure to remove samples of tissue from your prostate gland  The prostate is a male sex gland that makes fluid found in semen  It is located just below the bladder  After the samples are removed, they are sent to a lab and tested for cancer  How do I prepare for a prostate biopsy? · Your healthcare provider will talk to you about how to prepare for this procedure  He may tell you not to eat or drink anything after midnight on the day of your surgery  You will need to stop taking blood thinners several days before your procedure  Examples of blood thinners include warfarin and NSAIDs  You may also need to stop taking herbal supplements before your procedure  Your healthcare provider will tell you what other medicines to take or not take on the day of your procedure  · You will be given an antibiotic to help prevent a bacterial infection  You may need to give yourself an enema (liquid medicine put in your rectum) to help empty your bowel before your procedure  What will happen during a prostate biopsy? · You may need to lie on your side with your knees pulled up toward your chest  Numbing cream or gel may be put into your rectum, or numbing medicine may be injected near your prostate  You may instead be given general anesthesia to keep you asleep and free from pain during the procedure  A biopsy sample may be taken through your rectum, urethra, or perineum  The perineum is the area between your scrotum and rectum  Most of the time, biopsy samples are taken through the rectum  · If your healthcare provider takes a sample through your rectum, he will insert a small ultrasound probe into your rectum  The ultrasound shows pictures of your prostate on a monitor, and is used to help guide the biopsy needle   Your healthcare provider will push the biopsy needle through the wall of your rectum and into your prostate gland  Your healthcare provider will remove between 6 to 12 samples of tissue from different areas of your prostate gland  The samples will be sent to a lab and tested for cancer  What will happen after a prostate biopsy? You may feel soreness at the biopsy site  You may need to take antibiotics for up to 2 days after your procedure to help prevent an infection  You may have some bleeding from your rectum  You may also have blood in your urine, bowel movements, or semen  What are the risks of a prostate biopsy? You may bleed more than expected or get an infection in your urinary tract or prostate gland  The infection may spread to your blood and the rest of your body  Your bladder may not empty completely when you urinate  You may need a catheter to help empty your bladder for a short period of time  Cancer cells may be missed during your biopsy procedure  You may need another prostate biopsy to check for cancer again  CARE AGREEMENT:   You have the right to help plan your care  Learn about your health condition and how it may be treated  Discuss treatment options with your caregivers to decide what care you want to receive  You always have the right to refuse treatment  The above information is an  only  It is not intended as medical advice for individual conditions or treatments  Talk to your doctor, nurse or pharmacist before following any medical regimen to see if it is safe and effective for you  © 2017 [employer] Information is for End User's use only and may not be sold, redistributed or otherwise used for commercial purposes  All illustrations and images included in CareNotes® are the copyrighted property of [employer]  or [employer]

## 2019-10-16 ENCOUNTER — TELEPHONE (OUTPATIENT)
Dept: UROLOGY | Facility: CLINIC | Age: 64
End: 2019-10-16

## 2019-10-16 ENCOUNTER — OFFICE VISIT (OUTPATIENT)
Dept: NEPHROLOGY | Facility: CLINIC | Age: 64
End: 2019-10-16
Payer: COMMERCIAL

## 2019-10-16 VITALS
DIASTOLIC BLOOD PRESSURE: 78 MMHG | WEIGHT: 202 LBS | HEART RATE: 73 BPM | SYSTOLIC BLOOD PRESSURE: 144 MMHG | HEIGHT: 69 IN | BODY MASS INDEX: 29.92 KG/M2

## 2019-10-16 DIAGNOSIS — C67.0 MALIGNANT NEOPLASM OF TRIGONE OF URINARY BLADDER (HCC): ICD-10-CM

## 2019-10-16 DIAGNOSIS — I10 ESSENTIAL HYPERTENSION: Primary | ICD-10-CM

## 2019-10-16 DIAGNOSIS — Z12.5 SCREENING FOR PROSTATE CANCER: ICD-10-CM

## 2019-10-16 PROCEDURE — 99214 OFFICE O/P EST MOD 30 MIN: CPT | Performed by: INTERNAL MEDICINE

## 2019-10-16 RX ORDER — LISINOPRIL 2.5 MG/1
2.5 TABLET ORAL
Qty: 90 TABLET | Refills: 3 | Status: SHIPPED | OUTPATIENT
Start: 2019-10-16 | End: 2020-08-27

## 2019-10-16 NOTE — TELEPHONE ENCOUNTER
Patient recovered and was discharged yesterday before I got a chance to review the procedure with him  I did call him today to check in  Voicemail with call back number was left

## 2019-10-16 NOTE — PROGRESS NOTES
[address] [facility], [place]    Name: Tylor Maharaj  YOB: 1955      Assessment/Plan:    Malignant neoplasm of trigone of urinary bladder [facility])  Following with Urology         Problem List Items Addressed This Visit        Genitourinary    Malignant neoplasm of trigone of urinary bladder [facility])     Following with Urology            Other    Screening for prostate cancer      Other Visit Diagnoses     Essential hypertension    -  Primary    Bloo9d presure is excellent  Go by the second morning number  Relevant Medications    lisinopril (ZESTRIL) 2 5 mg tablet            Subjective:      Patient ID: Tylor Maharaj is a 62 y o  male  HPI  Has a higher BP in the morning and drops after lisinopril  Jeannetta Given He goes on the treadmill and it is  lower  After vigorous exercise it was [id number] systolic  The following portions of the patient's history were reviewed and updated as appropriate: allergies, current medications, past family history, past medical history, past social history, past surgical history and problem list     Review of Systems   Constitutional: Negative for activity change, appetite change, fatigue and unexpected weight change  HENT: Negative for congestion, ear discharge, ear pain, trouble swallowing and voice change  Eyes: Negative for pain, discharge and visual disturbance  Respiratory: Negative for cough, chest tightness, shortness of breath and wheezing  Cardiovascular: Negative for chest pain, palpitations and leg swelling  Gastrointestinal: Negative for abdominal pain, blood in stool, constipation, diarrhea, nausea and vomiting  Endocrine: Negative for heat intolerance, polydipsia, polyphagia and polyuria  Genitourinary: Negative for decreased urine volume, difficulty urinating, frequency and urgency  Musculoskeletal: Negative for arthralgias, back pain and gait problem  Skin: Negative for color change and rash  Neurological: Negative for dizziness, weakness and headaches  Social History     Socioeconomic History    Marital status:       Spouse name: None    Number of children: None    Years of education: None    Highest education level: None   Occupational History    None   Social Needs    Financial resource strain: None    Food insecurity:     Worry: None     Inability: None    Transportation needs:     Medical: None     Non-medical: None   Tobacco Use    Smoking status: Never Smoker    Smokeless tobacco: Never Used   Substance and Sexual Activity    Alcohol use: Yes     Frequency: 2-4 times a month     Drinks per session: 10 or more     Binge frequency: Weekly     Comment: liquor    Drug use: Never    Sexual activity: None   Lifestyle    Physical activity:     Days per week: None     Minutes per session: None    Stress: None   Relationships    Social connections:     Talks on phone: None     Gets together: None     Attends Confucianism service: None     Active member of club or organization: None     Attends meetings of clubs or organizations: None     Relationship status: None    Intimate partner violence:     Fear of current or ex partner: None     Emotionally abused: None     Physically abused: None     Forced sexual activity: None   Other Topics Concern    None   Social History Narrative    Does not consume caffeine      Past Medical History:   Diagnosis Date    Back pain     Bladder cancer ([facility] [address] )     Degenerative joint disease involving multiple joints     Gout     Herniated lumbar intervertebral disc     Hypertension     Tinnitus     Urinary retention     Wears glasses     Wears partial dentures     upper denture     Past Surgical History:   Procedure Laterality Date    CYSTOSCOPY  02/27/2019    SC BIOPSY OF PROSTATE,NEEDLE/PUNCH N/A 10/15/2019    Procedure: Bijal Worrell NEEDLE BIOPSY PROSTATE, CYSTOSCOPY;  Surgeon: Eden Essex, MD;  Location: [facility]; Service: Urology    AK CYSTOURETHROSCOPY,FULGUR <0 5 CM LESN N/A 11/14/2018    Procedure: TRANSURETHRAL RESECTION OF BLADDER TUMOR (TURBT); Surgeon: Asuncion Cat MD;  [facility];  Service: Urology    AK INSTILL ANTICANCER AGENT IN BLADDER N/A 11/14/2018    Procedure: Ana Myers;  Surgeon: Asuncion Cat MD;  [facility];  Service: Urology    TONSILLECTOMY      WISDOM TOOTH EXTRACTION         Current Outpatient Medications:     acetaminophen (TYLENOL) 500 mg tablet, Take 500 mg by mouth every 6 (six) hours as needed for mild pain, Disp: , Rfl:     ALLOPURINOL PO, Take 200 mg by mouth daily in the early morning , Disp: , Rfl:     lisinopril (ZESTRIL) 2 5 mg tablet, Take 1 tablet (2 5 mg total) by mouth daily in the early morning, Disp: 90 tablet, Rfl: 3    traMADol (ULTRAM) 50 mg tablet, Take 1 tablet (50 mg total) by mouth every 6 (six) hours as needed for moderate pain for up to 5 days, Disp: 5 tablet, Rfl: 0  No current facility-administered medications for this visit       Lab Results   Component Value Date    SODIUM 139 09/30/2019    K 3 7 09/30/2019     09/30/2019    CO2 28 09/30/2019    AGAP 7 09/30/2019    BUN 16 09/30/2019    CREATININE 1 11 09/30/2019    GLUF 89 09/30/2019    CALCIUM 8 5 09/30/2019    AST 20 09/30/2019    ALT 33 09/30/2019    ALKPHOS 53 09/30/2019    TP 7 4 09/30/2019    TBILI 0 80 09/30/2019    EGFR 70 09/30/2019     Lab Results   Component Value Date    WBC 7 37 09/30/2019    HGB 16 0 09/30/2019    HCT 47 3 09/30/2019    MCV 96 09/30/2019     09/30/2019     Lab Results   Component Value Date    CHOLESTEROL 147 09/30/2019    CHOLESTEROL 150 07/06/2018     Lab Results   Component Value Date    HDL 42 09/30/2019    HDL 42 07/06/2018     Lab Results   Component Value Date    LDLCALC 80 09/30/2019    LDLCALC 96 07/06/2018     Lab Results   Component Value Date    TRIG 125 09/30/2019    TRIG 62 07/06/2018     No results found for: CHOLHDL  No results found for: [id number], TSH  Lab Results   Component Value Date    CALCIUM 8 5 09/30/2019    PHOS 2 3 07/06/2018     No results found for: SPEP, UPEP  No results found for: NICKYQUENTIN [id number]        Objective:      /78 (BP Location: Left arm, Patient Position: Sitting, Cuff Size: Standard)   Pulse 73   Ht 5' 9" (1 753 m)   Wt 91 6 kg (202 lb)   BMI 29 83 kg/m²          Physical Exam   Constitutional: He is oriented to person, place, and time  He appears well-developed and well-nourished  No distress  HENT:   Head: Normocephalic  Right Ear: External ear normal    Left Ear: External ear normal    Nose: Nose normal    Mouth/Throat: Oropharynx is clear and moist    Eyes: Pupils are equal, round, and reactive to light  Conjunctivae are normal    Cardiovascular: Normal rate, regular rhythm and normal heart sounds  No murmur heard  Pulmonary/Chest: Effort normal and breath sounds normal  No respiratory distress  He has no wheezes  He has no rales  Abdominal: Soft  Bowel sounds are normal  He exhibits no distension  There is no tenderness  There is no rebound and no guarding  Musculoskeletal: Normal range of motion  He exhibits no edema  Neurological: He is alert and oriented to person, place, and time  Skin: Skin is warm and dry  Capillary refill takes less than 2 seconds  He is not diaphoretic  Psychiatric: He has a normal mood and affect

## 2019-10-29 ENCOUNTER — OFFICE VISIT (OUTPATIENT)
Dept: UROLOGY | Facility: CLINIC | Age: 64
End: 2019-10-29
Payer: COMMERCIAL

## 2019-10-29 VITALS
DIASTOLIC BLOOD PRESSURE: 84 MMHG | HEIGHT: 69 IN | HEART RATE: 78 BPM | SYSTOLIC BLOOD PRESSURE: 158 MMHG | WEIGHT: 203 LBS | BODY MASS INDEX: 30.07 KG/M2

## 2019-10-29 DIAGNOSIS — Z12.5 SCREENING FOR PROSTATE CANCER: ICD-10-CM

## 2019-10-29 DIAGNOSIS — R39.89 ABNORMAL PROSTATE EXAM: Primary | ICD-10-CM

## 2019-10-29 PROBLEM — R97.20 ELEVATED PSA: Status: RESOLVED | Noted: 2019-09-05 | Resolved: 2019-10-29

## 2019-10-29 PROCEDURE — 99214 OFFICE O/P EST MOD 30 MIN: CPT | Performed by: UROLOGY

## 2019-10-29 NOTE — PROGRESS NOTES
UROLOGY FOLLOW-UP NOTE     CHIEF COMPLAINT   Hai De Leon is a 62 y o  male with a complaint of   Chief Complaint   Patient presents with    Elevated PSA       History of Present Illness:     62 y o  male who developed gross hematuria and some voiding dysfunction in January of 2017  The patient has had several occurrences over the last almost 2 years  He thought that he was passing stones although he has never had a history of stone disease in the past   Patient finally sought care with a nephrologist who recommended return to our office  Patient was able to obtain a CT urogram prior to his visit  He has never been a cigarette or cigar smoker and does not use smokeless tobacco   There is a cancer history in his family but not renal or bladder  Patient underwent a bladder tumor resection on the 11/14/2018  Pathology returned low-grade papillary urothelial carcinoma  The patient had difficulty urinating over the 24 hours after surgery  Bear catheter was placed in our office for greater than 1 L  Patient was initially on Flomax but had side effects and stopped the medication  Retention resolved and patient improved  Returns in followup for bladder cancer  Patient is doing well without any concerns or complaints  Continues CaP screening with oscillating PSA  Lab Results   Component Value Date    PSA 4 4 (H) 08/26/2019    PSA 3 9 07/08/2019    PSA 4 7 (H) 07/06/2018     Because of the PSA and some fullness felt on the right side of his prostate, the patient agreed to proceed with biopsy  We discussed options for biopsy any underwent a transperineal biopsy in early October  Patient had very minimal bleeding and discomfort and no other issues with his procedure  He denies fevers or chills  He returns today for pathology  Of note the patient is a  and has not been sexually active for many years  He is unconcerned with sexual function      Past Medical History:     Past Medical History:   Diagnosis Date    Back pain     Bladder [facility] [address]     Degenerative joint disease involving multiple joints     Gout     Herniated lumbar intervertebral disc     Hypertension     Tinnitus     Urinary retention     Wears glasses     Wears partial dentures     upper denture       PAST SURGICAL HISTORY:     Past Surgical History:   Procedure Laterality Date    CYSTOSCOPY  02/27/2019    OH BIOPSY OF PROSTATE,NEEDLE/PUNCH N/A 10/15/2019    Procedure: Calvin Booker NEEDLE BIOPSY PROSTATE, CYSTOSCOPY;  Surgeon: Eran Shen MD;  [facility];  Service: Urology    OH CYSTOURETHROSCOPY,FULGUR <0 5 CM LESN N/A 11/14/2018    Procedure: TRANSURETHRAL RESECTION OF BLADDER TUMOR (TURBT); Surgeon: Eran Shen MD;  [facility];  Service: Urology    OH INSTILL ANTICANCER AGENT IN BLADDER N/A 11/14/2018    Procedure: Kandi Sal;  Surgeon: Eran Shen MD;  [facility];  Service: Urology    TONSILLECTOMY      WISDOM TOOTH EXTRACTION         CURRENT MEDICATIONS:     Current Outpatient Medications   Medication Sig Dispense Refill    acetaminophen (TYLENOL) 500 mg tablet Take 500 mg by mouth every 6 (six) hours as needed for mild pain      ALLOPURINOL PO Take 200 mg by mouth daily in the early morning       lisinopril (ZESTRIL) 2 5 mg tablet Take 1 tablet (2 5 mg total) by mouth daily in the early morning 90 tablet 3     No current facility-administered medications for this visit  ALLERGIES:   No Known Allergies    SOCIAL HISTORY:     Social History     Socioeconomic History    Marital status:       Spouse name: None    Number of children: None    Years of education: None    Highest education level: None   Occupational History    None   Social Needs    Financial resource strain: None    Food insecurity:     Worry: None     Inability: None    Transportation needs:     Medical: None     Non-medical: None   Tobacco Use  Smoking status: Never Smoker    Smokeless tobacco: Never Used   Substance and Sexual Activity    Alcohol use: Yes     Frequency: 2-4 times a month     Drinks per session: 10 or more     Binge frequency: Weekly     Comment: liquor    Drug use: Never    Sexual activity: None   Lifestyle    Physical activity:     Days per week: None     Minutes per session: None    Stress: None   Relationships    Social connections:     Talks on phone: None     Gets together: None     Attends Taoism service: None     Active member of club or organization: None     Attends meetings of clubs or organizations: None     Relationship status: None    Intimate partner violence:     Fear of current or ex partner: None     Emotionally abused: None     Physically abused: None     Forced sexual activity: None   Other Topics Concern    None   Social History Narrative    Does not consume caffeine        SOCIAL HISTORY:     Family History   Problem Relation Age of Onset    Heart attack Father     Cancer Father     Throat cancer Father     Heart disease Mother        REVIEW OF SYSTEMS:     Review of Systems   Constitutional: Negative  Respiratory: Negative  Cardiovascular: Negative  Gastrointestinal: Negative  Genitourinary: Negative  Musculoskeletal: Negative  Skin: Negative  Psychiatric/Behavioral: Negative  PHYSICAL EXAM:     /84   Pulse 78   Ht 5' 9" (1 753 m)   Wt 92 1 kg (203 lb)   BMI 29 98 kg/m²     General:  Healthy appearing male in no acute distress  They have a normal affect  There is not appear to be any gross neurologic defects or abnormalities  HEENT:  Normocephalic, atraumatic  Neck is supple without any palpable lymphadenopathy  Cardiovascular:  Patient has normal palpable distal radial pulses  There is no significant peripheral edema  No JVD is noted  Respiratory:  Patient has unlabored respirations  There is no audible wheeze or rhonchi    Abdomen:  Abdomen is without surgical scars  Abdomen is soft and nontender  There is no tympany  Inguinal and umbilical hernia are not appreciated  Genitourinary:   Circumcised phallus, orthotopic meatus, testicles descended, ESME   Some mild fullness towards the right lateral base but no distinct induration or nodule    Musculoskeletal:  Patient does not have significant CVA tenderness in the  flank with palpation or percussion  They full range of motion in all 4 extremities  Strength in all 4 extremities appears congruent  Patient is able to ambulate without assistance or difficulty  Dermatologic:  Patient has no skin abnormalities or rashes  LABS:     CBC:   Lab Results   Component Value Date    WBC 7 37 2019    HGB 16 0 2019    HCT 47 3 2019    MCV 96 2019     2019       BMP:   Lab Results   Component Value Date    CALCIUM 8 5 2019    K 3 7 2019    CO2 28 2019     2019    BUN 16 2019    CREATININE 1 11 2019     Lab Results   Component Value Date    PSA 4 4 (H) 2019    PSA 3 9 2019    PSA 4 7 (H) 2018     IMAGIN/19/19  RENAL ULTRASOUND     INDICATION:   R33 8: Other retention of urine  History of prior bladder cancer surgery     COMPARISON: CT renal protocol 2018     TECHNIQUE:   Ultrasound of the retroperitoneum was performed with a curvilinear transducer utilizing volumetric sweeps and still imaging techniques       FINDINGS:     KIDNEYS:  Symmetric and normal size  Right kidney:  11 1 x 5 1 cm  Left kidney:  10 8 x 6 8 cm      Right kidney  Normal echogenicity and contour  No suspicious masses detected  No hydronephrosis  No shadowing calculi  No perinephric fluid collections      Left kidney  Normal echogenicity and contour  No suspicious masses detected  No hydronephrosis  No shadowing calculi    No perinephric fluid collections      URETERS:  Nonvisualized      BLADDER:   Normally distended  No focal thickening or mass lesions  Bilateral ureteral jets detected         IMPRESSION:     Normal       9/12/18  CT ABDOMEN AND PELVIS WITH AND WITHOUT IV CONTRAST     INDICATION:   R31 0: Gross hematuria      COMPARISON:  CT scan 7/10/2018      TECHNIQUE: Initial CT of the abdomen and pelvis was performed without intravenous contrast   Subsequent dynamic CT evaluation of the abdomen and pelvis was performed after the administration of intravenous contrast in both nephrographic and delayed   phases after the administration of intravenous contrast   Axial, sagittal, and coronal 2D reformatted images were created from the source data and submitted for interpretation       Radiation dose length product (DLP) for this visit:  1852 mGy-cm   This examination, like all CT scans performed in the [facility], was performed utilizing techniques to minimize radiation dose exposure, including the use of iterative   reconstruction and automated exposure control      IV Contrast:  100 mL of iohexol (OMNIPAQUE)  Enteric Contrast:  Enteric contrast was not administered      FINDINGS:     ABDOMEN     RIGHT KIDNEY AND URETER:  No solid renal mass  No detectable urothelial mass  No hydronephrosis or hydroureter  No urinary tract calculi  No perinephric collection      LEFT KIDNEY AND URETER:  No solid renal mass  No detectable urothelial mass  No hydronephrosis or hydroureter  No urinary tract calculi  No perinephric collection      URINARY BLADDER:  There is a 3 3 cm soft tissue mass in the left posterior lateral aspect of the bladder, adjacent to the ureteral orifice  No calculi         LOWER CHEST:  No clinically significant abnormality identified in the visualized lower chest      LIVER/BILIARY TREE:  Unremarkable      GALLBLADDER:  No calcified gallstones   No pericholecystic inflammatory change      SPLEEN:  Unremarkable      PANCREAS:  Unremarkable      ADRENAL GLANDS: Unremarkable      STOMACH AND BOWEL:  There is colonic diverticulosis without evidence of acute diverticulitis      ABDOMINOPELVIC CAVITY:  No ascites  No free intraperitoneal air  No lymphadenopathy      VESSELS:  Unremarkable for patient's age      PELVIS     REPRODUCTIVE ORGANS:  Unremarkable for patient's age      APPENDIX: No findings to suggest appendicitis      ABDOMINAL WALL/INGUINAL REGIONS:  There is a small fat-containing umbilical hernia      OSSEOUS STRUCTURES:  No acute fracture or destructive osseous lesion      IMPRESSION:     3 3 cm mass in the left posterior lateral aspect of the urinary bladder  No evidence of metastatic disease      Colonic diverticulosis  PATHOLOGY:     10/15/19  Final Diagnosis   A  Prostate, right posterior medial, needle biopsy:  -  Prostatic adenocarcinoma, acinar, not otherwise specified; [place] grade 3 +4= score of 7 (grade group 2) in 2 of 2 cores involving 60% of needle core tissue and measuring up to 7 mm in contiguous length (score 4=10%)  -  Periprostatic fat invasion: Not identified   -  Seminal vesicle invasion: Not identified  -  Lymph-vascular invasion: Not identified   -  Perineural invasion: Not identified   -  Additional Pathologic Findings:  None      B  Positive, right posterior lateral, needle biopsy:  -  Prostatic adenocarcinoma, acinar, not otherwise specified; [place] grade 3 +4= score of 7 (grade group 2) in 2 of 2 cores involving 90% of needle core tissue and measuring up to 9 mm in contiguous length (score 4=20-30%)  -  Periprostatic fat invasion: Not identified   -  Seminal vesicle invasion: Not identified  -  Lymph-vascular invasion: Not identified   -  Perineural invasion: Present   -  Additional Pathologic Findings:  None      C    Prostate, right anterior lateral, needle biopsy:  -  Prostatic adenocarcinoma, acinar, not otherwise specified; [place] grade 3 +4= score of 7 (grade group 2) in 1 of 2 cores involving 40-50% of needle core tissue and measuring up to 9 mm in contiguous length (score 4=10-20%)  -  Periprostatic fat invasion: Not identified   -  Seminal vesicle invasion: Not identified  -  Lymph-vascular invasion: Not identified   -  Perineural invasion: Not identified   -  Additional Pathologic Findings:  None      D  Prostate, right anterior medial, needle biopsy:  -  Benign prostate tissue, negative for malignancy      E  Prostate, right base, needle biopsy:  -  Focal atypical small acinar proliferation, favor prostatic adenocarcinoma, focus shows crush artifact and is  too small to be further differentiated and appropriately graded      F  Prostate, left posterior medial, needle biopsy:  -  Benign prostate tissue, negative for malignancy      G  Prostate, left posterior lateral, needle biopsy:  -  Benign prostate tissue, negative for malignancy      H  Prostate, left anterior lateral, needle biopsy:  -  Benign prostate tissue, negative for malignancy      I  Prostate, left anterior medial, needle biopsy:  -  Benign prostate tissue, negative for malignancy      J   Prostate, left base, needle biopsy:  -  Benign prostate tissue, negative for malignancy  11/14/18  Final Diagnosis   A  Urinary bladder, excision:             - Noninvasive low-grade papillary urothelial carcinoma  - Uninvolved muscularis propria is identified                 Interpretation performed at [address] [address]        NOMOGRAM:         ASSESSMENT:     62 y o  male with LGTa Bladder Cancer, newly diagnosed cT1c [place] 3+3=7 prostate cancer    PLAN:     1  h/o LG Ta UC bladder, diagnosis 11/2018 -  Cystoscopy today deferred as patient will be under anesthesia for his transperineal biopsy  Due next in January 2020  2  Prostate cancer  I had a very lengthy discussion with the patient detailing the pathology from his prostate biopsy      At this point time, he has a clinical [id number] cancer, Megan score 3+4=7  This can be considered a Intermediate risk prostate cancer  We discussed all potential options for the treatment of prostate cancer  These include watchful waiting, active surveillance, local thermal therapies like cryoablation or high-frequency ultrasound, surgical extirpation via open and robotic approaches, and external beam radiation  I gave the patient a brief overview of each of these options  I have provided the patient a copy of the pathology report  I've offered the patient referrals to radiation oncology or options for second opinions  I've given the patient resources to more completely understand their diagnosis  Based on their NCCN risk profile, I have notopted to obtain additional imaging in the form  I will plan to see the patient back within the month to further discuss their decision for treatment after they have had time to digest their diagnosis  All questions and concerns have been answered  I've made myself available for a sooner appointment or available to discuss further by phone

## 2019-11-05 ENCOUNTER — RADIATION ONCOLOGY CONSULT (OUTPATIENT)
Dept: RADIATION ONCOLOGY | Facility: CLINIC | Age: 64
End: 2019-11-05
Attending: RADIOLOGY
Payer: COMMERCIAL

## 2019-11-05 VITALS
OXYGEN SATURATION: 98 % | WEIGHT: 198.63 LBS | TEMPERATURE: 97.8 F | SYSTOLIC BLOOD PRESSURE: 142 MMHG | HEIGHT: 69 IN | DIASTOLIC BLOOD PRESSURE: 70 MMHG | RESPIRATION RATE: 16 BRPM | HEART RATE: 64 BPM | BODY MASS INDEX: 29.42 KG/M2

## 2019-11-05 DIAGNOSIS — C61 PROSTATE CANCER (HCC): Primary | ICD-10-CM

## 2019-11-05 DIAGNOSIS — Z12.5 SCREENING FOR PROSTATE CANCER: ICD-10-CM

## 2019-11-05 DIAGNOSIS — R39.89 ABNORMAL PROSTATE EXAM: ICD-10-CM

## 2019-11-05 PROCEDURE — 99211 OFF/OP EST MAY X REQ PHY/QHP: CPT | Performed by: RADIOLOGY

## 2019-11-05 PROCEDURE — G0463 HOSPITAL OUTPT CLINIC VISIT: HCPCS | Performed by: RADIOLOGY

## 2019-11-05 NOTE — PROGRESS NOTES
Consultation - Radiation Oncology     GTN:[id number] : 1955  Encounter: [id number]  Patient Information: [address], [address]  Chief Complaint   Patient presents with    Consult    Prostate Cancer     Cancer Staging  No matching staging information was found for the patient  History of Present Illness   Jenna Sy is a 62y o  year old male who presents  for radiation consult for newly diagnosed Megan 7 prostate cancer  Referred by Dr Quenton Severin       Patient with PMH of noninvasive low grade papillary urothelial carcinoma, diagnosed with TURBT 18  He has been followed by urology with surveillance cystoscopies, most recent was 10/15/19, no recurrence noted        He now presents with rise in PSA and abnormal ESME noted at his 19 urology follow up  ESME showed some mild fullness towards the right lateral base but no distinct induration or nodule  Prostate biopsy was recommended            Lab Results   Component Value Date     PSA 4 4 (H) 2019     PSA 3 9 2019     PSA 4 7 (H) 2018      10/15/19 Transperineal needle prostate biopsy and cystoscopy  Pathology: Adenocarcinoma, [place] score 7 (3+4)     10/29/19 Dr Quenton Severin FU  - clinical T1c prostate cancer and treatment options discussed  - refer to radiation oncology for discussion  - due for next cysto 19 Dr Quenton Severin FU    He denies any significant urinary symptoms  He has no nocturia  He is not sexually active however denies any sexual dysfunction  No rectal or bowel symptomatology  Historical Information      Malignant neoplasm of urinary bladder ([address] )    2018 Initial Diagnosis     Malignant neoplasm of urinary bladder ([address] )      2018 Surgery     Urinary bladder, excision:             - Noninvasive low-grade papillary urothelial              carcinoma  - Uninvolved muscularis propria is identified            Prostate cancer ([address] )    10/15/2019 Initial Diagnosis     Prostate cancer ([facility] )      10/15/2019 Biopsy     TRANPERINEAL NEEDLE BIOPSY PROSTATE, CYSTOSCOPY     A   Prostate, right posterior medial, needle biopsy:  -  Prostatic adenocarcinoma, acinar, not otherwise specified; [place] grade 3 +4= score of 7 (grade group 2) in 2 of 2 cores involving 60% of needle core tissue and measuring up to 7 mm in contiguous length (score 4=10%)  -  Periprostatic fat invasion: Not identified   -  Seminal vesicle invasion: Not identified  -  Lymph-vascular invasion: Not identified   -  Perineural invasion: Not identified   -  Additional Pathologic Findings:  None      B  Positive, right posterior lateral, needle biopsy:  -  Prostatic adenocarcinoma, acinar, not otherwise specified; [place] grade 3 +4= score of 7 (grade group 2) in 2 of 2 cores involving 90% of needle core tissue and measuring up to 9 mm in contiguous length (score 4=20-30%)  -  Periprostatic fat invasion: Not identified   -  Seminal vesicle invasion: Not identified  -  Lymph-vascular invasion: Not identified   -  Perineural invasion: Present   -  Additional Pathologic Findings:  None      C  Prostate, right anterior lateral, needle biopsy:  -  Prostatic adenocarcinoma, acinar, not otherwise specified; [place] grade 3 +4= score of 7 (grade group 2) in 1 of 2 cores involving 40-50% of needle core tissue and measuring up to 9 mm in contiguous length (score 4=10-20%)  -  Periprostatic fat invasion: Not identified   -  Seminal vesicle invasion: Not identified  -  Lymph-vascular invasion: Not identified   -  Perineural invasion: Not identified   -  Additional Pathologic Findings:  None      D  Prostate, right anterior medial, needle biopsy:  -  Benign prostate tissue, negative for malignancy      E    Prostate, right base, needle biopsy:  -  Focal atypical small acinar proliferation, favor prostatic adenocarcinoma, focus shows crush artifact and is  too small to be further differentiated and appropriately graded      F  Prostate, left posterior medial, needle biopsy:  -  Benign prostate tissue, negative for malignancy      G  Prostate, left posterior lateral, needle biopsy:  -  Benign prostate tissue, negative for malignancy      H  Prostate, left anterior lateral, needle biopsy:  -  Benign prostate tissue, negative for malignancy      I  Prostate, left anterior medial, needle biopsy:  -  Benign prostate tissue, negative for malignancy      J   Prostate, left base, needle biopsy:  -  Benign prostate tissue, negative for malignancy  Past Medical History:   Diagnosis Date    Back pain     Bladder cancer ([address] )     Degenerative joint disease involving multiple joints     Gout     Herniated lumbar intervertebral disc     Hypertension     Tinnitus     Urinary retention     Wears glasses     Wears partial dentures     upper denture     Past Surgical History:   Procedure Laterality Date    CYSTOSCOPY  02/27/2019    MT BIOPSY OF PROSTATE,NEEDLE/PUNCH N/A 10/15/2019    Procedure: Douglas Fernandez NEEDLE BIOPSY PROSTATE, CYSTOSCOPY;  Surgeon: Alexandra Mujica MD;  [facility];  Service: Urology    MT CYSTOURETHROSCOPY,FULGUR <0 5 CM LESN N/A 11/14/2018    Procedure: TRANSURETHRAL RESECTION OF BLADDER TUMOR (TURBT);   Surgeon: Alexandra Mujica MD;  [facility];  Service: Urology    MT INSTILL ANTICANCER AGENT IN BLADDER N/A 11/14/2018    Procedure: Laxmi Everett;  Surgeon: Alexandra Mujica MD;  [facility];  Service: Urology    TONSILLECTOMY      WISDOM TOOTH EXTRACTION         Family History   Problem Relation Age of Onset    Heart attack Father     Throat cancer Father     Cancer Father         throat cancer    Heart disease Mother        Social History   Social History     Substance and Sexual Activity   Alcohol Use Yes    Frequency: 2-4 times a month    Drinks per session: 10 or more    Binge frequency: Weekly    Comment: liquor     Social History     Substance and Sexual Activity   Drug Use Never     Social History     Tobacco Use   Smoking Status Never Smoker   Smokeless Tobacco Never Used         Meds/Allergies     Current Outpatient Medications:     acetaminophen (TYLENOL) 500 mg tablet, Take 500 mg by mouth every 6 (six) hours as needed for mild pain, Disp: , Rfl:     ALLOPURINOL PO, Take 200 mg by mouth daily in the early morning , Disp: , Rfl:     lisinopril (ZESTRIL) 2 5 mg tablet, Take 1 tablet (2 5 mg total) by mouth daily in the early morning, Disp: 90 tablet, Rfl: 3  No Known Allergies      Review of Systems  Constitutional: Negative  HENT: Positive for tinnitus  Eyes: Negative  Respiratory: Negative  Cardiovascular: Negative  Gastrointestinal: Negative  Endocrine: Negative  Genitourinary: Negative  Musculoskeletal: Positive for back pain (DDD)  Skin: Negative  Allergic/Immunologic: Negative  Neurological: Negative  Hematological: Negative  Psychiatric/Behavioral: Negative          OBJECTIVE:   /70   Pulse 64   Temp 97 8 °F (36 6 °C)   Resp 16   Ht 5' 9" (1 753 m)   Wt 90 1 kg (198 lb 10 2 oz)   SpO2 98%   BMI 29 33 kg/m²   Pain Assessment:  0  Performance Status: ECOG/Zubrod/WHO: 0 - Asymptomatic    Physical Exam   Constitutional: He appears well-developed  HENT:   Head: Normocephalic  Mouth/Throat: Oropharynx is clear and moist    Eyes: Pupils are equal, round, and reactive to light  EOM are normal    Neck: Normal range of motion  Neck supple  Cardiovascular: Normal rate, regular rhythm and normal heart sounds  Pulmonary/Chest: Effort normal and breath sounds normal  He has no wheezes  He exhibits no tenderness  Abdominal: Soft  Bowel sounds are normal  He exhibits no distension and no mass  There is no tenderness  Genitourinary: Rectum normal    Genitourinary Comments: On rectal exam his prostate had firmness in the right lateral lobe    No blood at the tip of the examining glove   Musculoskeletal: Normal range of motion  He exhibits no edema  Lymphadenopathy:     He has no cervical adenopathy  Neurological: He is alert  No cranial nerve deficit  Coordination normal    Skin: Skin is warm  No rash noted  No erythema  Psychiatric: He has a normal mood and affect  His behavior is normal    Vitals reviewed  RESULTS  Lab Results    Chemistry        Component Value Date/Time    K 3 7 09/30/2019 0906     09/30/2019 0906    CO2 28 09/30/2019 0906    BUN 16 09/30/2019 0906    CREATININE 1 11 09/30/2019 0906        Component Value Date/Time    CALCIUM 8 5 09/30/2019 0906    ALKPHOS 53 09/30/2019 0906    AST 20 09/30/2019 0906    ALT 33 09/30/2019 0906            Lab Results   Component Value Date    WBC 7 37 09/30/2019    HGB 16 0 09/30/2019    HCT 47 3 09/30/2019    MCV 96 09/30/2019     09/30/2019         Imaging Studies  No results found  Pathology:    Final Diagnosis   A  Prostate, right posterior medial, needle biopsy:  -  Prostatic adenocarcinoma, acinar, not otherwise specified; [place] grade 3 +4= score of 7 (grade group 2) in 2 of 2 cores involving 60% of needle core tissue and measuring up to 7 mm in contiguous length (score 4=10%)  -  Periprostatic fat invasion: Not identified   -  Seminal vesicle invasion: Not identified  -  Lymph-vascular invasion: Not identified   -  Perineural invasion: Not identified   -  Additional Pathologic Findings:  None      B  Positive, right posterior lateral, needle biopsy:  -  Prostatic adenocarcinoma, acinar, not otherwise specified; [place] grade 3 +4= score of 7 (grade group 2) in 2 of 2 cores involving 90% of needle core tissue and measuring up to 9 mm in contiguous length (score 4=20-30%)  -  Periprostatic fat invasion: Not identified   -  Seminal vesicle invasion: Not identified    -  Lymph-vascular invasion: Not identified   -  Perineural invasion: Present   -  Additional Pathologic Findings:  None      C  Prostate, right anterior lateral, needle biopsy:  -  Prostatic adenocarcinoma, acinar, not otherwise specified; [place] grade 3 +4= score of 7 (grade group 2) in 1 of 2 cores involving 40-50% of needle core tissue and measuring up to 9 mm in contiguous length (score 4=10-20%)  -  Periprostatic fat invasion: Not identified   -  Seminal vesicle invasion: Not identified  -  Lymph-vascular invasion: Not identified   -  Perineural invasion: Not identified   -  Additional Pathologic Findings:  None      D  Prostate, right anterior medial, needle biopsy:  -  Benign prostate tissue, negative for malignancy      E  Prostate, right base, needle biopsy:  -  Focal atypical small acinar proliferation, favor prostatic adenocarcinoma, focus shows crush artifact and is  too small to be further differentiated and appropriately graded      F  Prostate, left posterior medial, needle biopsy:  -  Benign prostate tissue, negative for malignancy      G  Prostate, left posterior lateral, needle biopsy:  -  Benign prostate tissue, negative for malignancy      H  Prostate, left anterior lateral, needle biopsy:  -  Benign prostate tissue, negative for malignancy      I  Prostate, left anterior medial, needle biopsy:  -  Benign prostate tissue, negative for malignancy      J   Prostate, left base, needle biopsy:  -  Benign prostate tissue, negative for malignancy                ASSESSMENT  1  Prostate cancer ([facility] )     2  Abnormal prostate exam  Ambulatory referral to Radiation Oncology   3  Screening for prostate cancer  Ambulatory referral to Radiation Oncology     Cancer Staging  No matching staging information was found for the patient  PLAN/DISCUSSION  No orders of the defined types were placed in this encounter  More Crowe is a 62y o  year old male with adenocarcinoma [place] score 7 (3+ 4) of the prostate  His PSA is 4 7 and    We discussed that he falls into the intermediate risk, favorable subtype category  We reviewed options which can include active surveillance, surgery or radiation treatment  I believe his life expectancy would be greater than 10 years and consideration for curative therapy was discussed  I reviewed with him both brachytherapy as well as dose escalated external beam radiation treatment  For external beam treatment we discussed the procedure involved with fiducial marker placement to assist with IGRT  In addition we discussed consideration for spaceoar Hydrogel insertion to minimize risk of rectal toxicity  I reviewed a plan delivering a dose of 7920 cGy  In addition we briefly discussed a moderately hypo fractionated regimen  The possible acute as well as long-term side effects were discussed with him in detail  These can include fatigue, urinary frequency, urgency, dysuria, enteritis, proctitis, cystitis and sexual dysfunction  We did discuss that should he have space or insertion this last for 12 weeks and he would need to complete his radiation treatment within that time frame  We reviewed that should he proceed with definitive radiation treatment upfront and have failure in the future he would not be eligible for surgical resection at that time  We would typically treat this with hormone deprivation  He is aware that should he elect to pursue upfront surgery and has local failure he could be considered for local radiation treatment to the prostate bed  He plans to follow with Dr Ynes Buckley next month at which time he will decide on which treatment he would like to pursue  I have asked that he contact us with his decision            Hampton Gitelman, MD  11/5/2019,11:40 AM      Portions of the record may have been created with voice recognition software   Occasional wrong word or "sound a like" substitutions may have occurred due to the inherent limitations of voice recognition software   Read the chart carefully and recognize, using context, where substitutions have occurred

## 2019-11-05 NOTE — PROGRESS NOTES
Deedee Jeff 1955 is a 62 y o  male     62year old male presents for radiation consult for newly diagnosed [place] 7 prostate cancer  Referred by Dr Clark Mcfarland  Patient with PMH of noninvasive low grade papillary urothelial carcinoma, diagnosed with TURBT 11/14/18  He has been followed by urology with surveillance cystoscopies, most recent was 10/15/19, no recurrence noted  He now presents with rise in PSA and abnormal ESME noted at his 9/5/19 urology follow up  ESME showed some mild fullness towards the right lateral base but no distinct induration or nodule  Prostate biopsy was recommended  Lab Results   Component Value Date    PSA 4 4 (H) 08/26/2019    PSA 3 9 07/08/2019    PSA 4 7 (H) 07/06/2018     10/15/19 Transperineal needle prostate biopsy and cystoscopy  Pathology: Adenocarcinoma, [place] score 7 (3+4)    10/29/19 Dr Clark Mcfarland FU  - clinical T1c prostate cancer and treatment options discussed  - refer to radiation oncology for discussion  - due for next cysto Jan 2020 12/9/19 Dr Marivel Muir    Oncology History    62year old male presents for radiation consult for newly diagnosed Megan 7 prostate cancer  Referred by Dr Clark Mcfarland  Patient with PMH of noninvasive low grade papillary urothelial carcinoma, diagnosed with TURBT 11/14/18  He has been followed by urology with surveillance cystoscopies, most recent was 10/15/19, no recurrence noted  He now presents with rise in PSA and abnormal ESME noted at his 9/5/19 urology follow up  ESME showed some mild fullness towards the right lateral base but no distinct induration or nodule  Prostate biopsy was recommended        Lab Results   Component Value Date    PSA 4 4 (H) 08/26/2019    PSA 3 9 07/08/2019    PSA 4 7 (H) 07/06/2018       10/15/19 Transperineal needle prostate biopsy and cystoscopy  Pathology: Adenocarcinoma, Megan score 7 (3+4)    10/29/19 Dr Clark Mcfarland FU  - clinical T1c prostate cancer and treatment options discussed  - refer to radiation oncology for discussion  - due for next cysto Jan 2020 12/9/19 Dr Sherrell Ibanez             Malignant neoplasm of urinary bladder ([facility] )    11/14/2018 Initial Diagnosis     Malignant neoplasm of urinary bladder ([facility] )      11/14/2018 Surgery     Urinary bladder, excision:             - Noninvasive low-grade papillary urothelial              carcinoma  - Uninvolved muscularis propria is identified  Prostate cancer ([facility] )    10/15/2019 Initial Diagnosis     Prostate cancer ([place] [address] )      10/15/2019 Biopsy     TRANPERINEAL NEEDLE BIOPSY PROSTATE, CYSTOSCOPY     A   Prostate, right posterior medial, needle biopsy:  -  Prostatic adenocarcinoma, acinar, not otherwise specified; [place] grade 3 +4= score of 7 (grade group 2) in 2 of 2 cores involving 60% of needle core tissue and measuring up to 7 mm in contiguous length (score 4=10%)  -  Periprostatic fat invasion: Not identified   -  Seminal vesicle invasion: Not identified  -  Lymph-vascular invasion: Not identified   -  Perineural invasion: Not identified   -  Additional Pathologic Findings:  None      B  Positive, right posterior lateral, needle biopsy:  -  Prostatic adenocarcinoma, acinar, not otherwise specified; [place] grade 3 +4= score of 7 (grade group 2) in 2 of 2 cores involving 90% of needle core tissue and measuring up to 9 mm in contiguous length (score 4=20-30%)  -  Periprostatic fat invasion: Not identified   -  Seminal vesicle invasion: Not identified  -  Lymph-vascular invasion: Not identified   -  Perineural invasion: Present   -  Additional Pathologic Findings:  None      C  Prostate, right anterior lateral, needle biopsy:  -  Prostatic adenocarcinoma, acinar, not otherwise specified; [place] grade 3 +4= score of 7 (grade group 2) in 1 of 2 cores involving 40-50% of needle core tissue and measuring up to 9 mm in contiguous length (score 4=10-20%)    -  Periprostatic fat invasion: Not identified   -  Seminal vesicle invasion: Not identified  -  Lymph-vascular invasion: Not identified   -  Perineural invasion: Not identified   -  Additional Pathologic Findings:  None      D  Prostate, right anterior medial, needle biopsy:  -  Benign prostate tissue, negative for malignancy      E  Prostate, right base, needle biopsy:  -  Focal atypical small acinar proliferation, favor prostatic adenocarcinoma, focus shows crush artifact and is  too small to be further differentiated and appropriately graded      F  Prostate, left posterior medial, needle biopsy:  -  Benign prostate tissue, negative for malignancy      G  Prostate, left posterior lateral, needle biopsy:  -  Benign prostate tissue, negative for malignancy      H  Prostate, left anterior lateral, needle biopsy:  -  Benign prostate tissue, negative for malignancy      I  Prostate, left anterior medial, needle biopsy:  -  Benign prostate tissue, negative for malignancy      J   Prostate, left base, needle biopsy:  -  Benign prostate tissue, negative for malignancy           Clinical Trial: no    Health Maintenance   Topic Date Due    Hepatitis C Screening  1955    Depression Screening PHQ  1955    Medicare Annual Wellness Visit (AWV)  1955    CRC Screening: Colonoscopy  1955    Pneumococcal Vaccine: Pediatrics (0 to 5 Years) and At-Risk Patients (6 to 59 Years) (1 of 3 - PCV13) 02/17/1962    BMI: Followup Plan  02/17/1974    DTaP,Tdap,and Td Vaccines (1 - Tdap) 02/17/1977    INFLUENZA VACCINE  07/01/2019    BMI: Adult  10/29/2020    Pneumococcal Vaccine: 65+ Years (1 of 2 - PCV13) 02/17/2021    HEPATITIS B VACCINES  Aged Out       Past Medical History:   Diagnosis Date    Back pain     Bladder cancer ([facility] )     Degenerative joint disease involving multiple joints     Gout     Herniated lumbar intervertebral disc     Hypertension     Tinnitus     Urinary retention     Wears glasses     Wears partial dentures     upper denture       Past Surgical History:   Procedure Laterality Date    CYSTOSCOPY  02/27/2019    IN BIOPSY OF PROSTATE,NEEDLE/PUNCH N/A 10/15/2019    Procedure: Yajaira Day NEEDLE BIOPSY PROSTATE, CYSTOSCOPY;  Surgeon: Michael Sheehan MD;  [facility];  Service: Urology    IN CYSTOURETHROSCOPY,FULGUR <0 5 CM LESN N/A 11/14/2018    Procedure: TRANSURETHRAL RESECTION OF BLADDER TUMOR (TURBT); Surgeon: Michael Sheehan MD;  [facility];  Service: Urology    IN INSTILL ANTICANCER AGENT IN BLADDER N/A 11/14/2018    Procedure: Jarek [place];  Surgeon: Michael Sheehan MD;  [facility];  Service: Urology    TONSILLECTOMY      WISDOM TOOTH EXTRACTION         Family History   Problem Relation Age of Onset    Heart attack Father     Throat cancer Father     Cancer Father         throat cancer    Heart disease Mother        Social History     Tobacco Use    Smoking status: Never Smoker    Smokeless tobacco: Never Used   Substance Use Topics    Alcohol use: Yes     Frequency: 2-4 times a month     Drinks per session: 10 or more     Binge frequency: Weekly     Comment: liquor    Drug use: Never          Current Outpatient Medications:     acetaminophen (TYLENOL) 500 mg tablet, Take 500 mg by mouth every 6 (six) hours as needed for mild pain, Disp: , Rfl:     ALLOPURINOL PO, Take 200 mg by mouth daily in the early morning , Disp: , Rfl:     lisinopril (ZESTRIL) 2 5 mg tablet, Take 1 tablet (2 5 mg total) by mouth daily in the early morning, Disp: 90 tablet, Rfl: 3    No Known Allergies     Review of Systems:  Review of Systems   Constitutional: Negative  HENT: Positive for tinnitus  Eyes: Negative  Respiratory: Negative  Cardiovascular: Negative  Gastrointestinal: Negative  Endocrine: Negative  Genitourinary: Negative  Musculoskeletal: Positive for back pain (DDD)  Skin: Negative  Allergic/Immunologic: Negative  Neurological: Negative  Hematological: Negative  Psychiatric/Behavioral: Negative  Vitals:    11/05/19 0926   BP: 142/70   Pulse: 64   Resp: 16   Temp: 97 8 °F (36 6 °C)   SpO2: 98%   Weight: 90 1 kg (198 lb 10 2 oz)   Height: 5' 9" (1 753 m)       Pain Score: 0-No pain    Pain assessment: 0    PSA   Date Value Ref Range Status   07/08/2019 3 9 0 0 - 4 0 ng/mL Final     Comment:       American Urological Association Guidelines define biochemical recurrence of prostate cancer as a detectable or rising PSA value post-radical prostatectomy that is greater than or equal to 0 2 ng/mL with a second confirmatory level of greater than or equal to 0 2 ng/mL  Prostate Specific Antigen Total   Date Value Ref Range Status   08/26/2019 4 4 (H) 0 0 - 4 0 ng/mL Final     Comment:     Roche ECLIA methodology  According to the American Urological Association, Serum PSA should  decrease and remain at undetectable levels after radical  prostatectomy  The AUA defines biochemical recurrence as an initial  PSA value 0 2 ng/mL or greater followed by a subsequent confirmatory  PSA value 0 2 ng/mL or greater  Values obtained with different assay methods or kits cannot be used  interchangeably  Results cannot be interpreted as absolute evidence  of the presence or absence of malignant disease  07/06/2018 4 7 (H) 0 0 - 4 0 ng/mL Final     Comment:     Roche ECLIA methodology  According to the American Urological Association, Serum PSA should  decrease and remain at undetectable levels after radical  prostatectomy  The AUA defines biochemical recurrence as an initial  PSA value 0 2 ng/mL or greater followed by a subsequent confirmatory  PSA value 0 2 ng/mL or greater  Values obtained with different assay methods or kits cannot be used  interchangeably  Results cannot be interpreted as absolute evidence  of the presence or absence of malignant disease    :    Imaging:No images are attached to the encounter  Teaching:  RT to pelvis  Procedure for sim & daily treatments  Pelvic diet  Skin care  Fatigue  [employer] booklets

## 2019-11-19 ENCOUNTER — DOCUMENTATION (OUTPATIENT)
Dept: UROLOGY | Facility: AMBULATORY SURGERY CENTER | Age: 64
End: 2019-11-19

## 2019-11-19 NOTE — PROGRESS NOTES
Oncology Navigator Note: Multidisciplinary Urology Case Review 11/19/19  Physician Recommended Plan    Alicia Curz is a 62 y o  male    Diagnosis:  Prostate Cancer, Megan 7    Patient was discussed at the Multidisciplinary Urology Case review on 11/19/19  The group recommended that the patient should have definitive treatment for his Prostate Cancer with either prostatectomy or radiation therapy

## 2019-12-09 ENCOUNTER — OFFICE VISIT (OUTPATIENT)
Dept: UROLOGY | Facility: CLINIC | Age: 64
End: 2019-12-09
Payer: COMMERCIAL

## 2019-12-09 VITALS
HEART RATE: 64 BPM | DIASTOLIC BLOOD PRESSURE: 78 MMHG | WEIGHT: 200.8 LBS | BODY MASS INDEX: 29.74 KG/M2 | HEIGHT: 69 IN | SYSTOLIC BLOOD PRESSURE: 144 MMHG

## 2019-12-09 DIAGNOSIS — C61 PROSTATE CANCER (HCC): Primary | ICD-10-CM

## 2019-12-09 PROCEDURE — 99214 OFFICE O/P EST MOD 30 MIN: CPT | Performed by: UROLOGY

## 2019-12-09 NOTE — PATIENT INSTRUCTIONS
Robot Assisted Laparoscopic Prostatectomy   WHAT YOU NEED TO KNOW:   Robot-assisted laparoscopic prostatectomy (RALP) is surgery to remove your prostate gland through small incisions in your abdomen  RALP is done with a machine that is controlled by your surgeon  The machine has mechanical arms that use small tools to remove your prostate  HOW TO PREPARE:   The week before your surgery:   · Bring your medicine bottles or a list of your medicines when you see your caregiver  Tell your caregiver if you are allergic to any medicine  Tell your caregiver if you use any herbs, food supplements, or over-the-counter medicine  · Ask your caregiver if you need to stop using aspirin or any other prescribed or over-the-counter medicine before your procedure or surgery  · Tell your healthcare provider about any other surgeries or cancer treatments you had  Tell your healthcare provider if you have had bleeding problems  · You may need a blood transfusion if you lose a large amount of blood during surgery  You may be able to donate your own blood before surgery  You may also ask a family member or friend with the same blood type to donate blood for you  · Arrange a ride home  Ask a family member or friend to drive you home after your surgery or procedure  Do not drive yourself home  · You may need to have tests done before surgery, such as blood tests or a chest x-ray  Ask your healthcare provider for more information about these and other tests that you may need  Write down the date, time, and location of each test     · Write down the correct date, time, and location of your surgery  The day before your surgery:   · Clear liquid diet:  You may have to stop eating solid food for up to 2 days before your surgery  You can drink water, broth, apple juice, or lemon-lime soft drinks  You may also suck on ice chips or eat gelatin  · Bowel cleansing:   You may be given medicine to drink or an enema to clean out your bowel  The day of your surgery:   · You or a close family member will be asked to sign a legal document called a consent form  It gives caregivers permission to do the procedure or surgery  It also explains the problems that may happen, and your choices  Make sure all your questions are answered before you sign this form  · Ask your caregiver before you take any medicine on the day of your surgery  Bring a list of all the medicines you take, or your pill bottles, with you to the hospital  Caregivers will check that your medicines will not interact poorly with the medicine you need for surgery  · Caregivers may insert an intravenous tube (IV) into your vein  A vein in the arm is usually chosen  Through the IV tube, you may be given liquids and medicine  · You may need to take antibiotic medicine to help prevent an infection caused by bacteria  You may get antibiotic medicine before and after your surgery  WHAT WILL HAPPEN:   What will happen:   · You will have monitors to check your heartbeat, blood pressure, and breathing  General anesthesia will keep you asleep during your surgery  A urinary catheter is put into your bladder to drain your urine  Healthcare providers will put stockings on your legs to apply pressure, which will help prevent blood clots  Your legs will then be placed in stirrups  · The robotic arms place a laparoscope and other tools inside your abdomen through small cuts  A laparoscope is a long, thin tube with a light and camera on the end  A gas called carbon dioxide is pumped into your abdomen to inflate it  This gives healthcare providers room so they can see your prostate better  Your surgeon uses the camera to see inside your abdomen  He guides the robotic arms to cut the prostate away from other tissues  The prostate is removed through one of the small cuts in your abdomen  Lymph nodes may also be removed   Your surgeon uses the robotic arms to stitch your incisions closed  After your surgery: You will be taken to a room where you can rest until you are fully awake  Bandages over your incisions will help prevent infection  A urinary catheter will drain your urine while you heal  Do not get out of bed until your healthcare provider says it is okay  Once healthcare providers see that you are not having any problems, you will be taken to your hospital room  CONTACT YOUR HEALTHCARE PROVIDER IF:   · You cannot make it to your surgery on time  · You have a fever  · Your signs and symptoms are getting worse  · You have questions or concerns about your surgery  RISKS:   · You may bleed more than expected or get an infection  Your bladder, ureters (tubes that drain urine from your body), intestines, or rectum could be cut during surgery  After RALP, you may have problems urinating or having bowel movements  You may need more surgery to treat urination problems  You may not be able to have an erection after surgery  Your stitches may come apart  You may have a hernia or develop an abscess (deep infection)  Your recovery may take longer if you need larger cuts in your abdomen  · You may get a blood clot in your arm or leg  The clot may travel to your heart or brain and cause life-threatening problems, such as a heart attack or stroke  Even with surgery, the cancer may come back  · If you do not have the prostatectomy, your signs and symptoms could get worse  Your cancer may spread to other areas of your body and be more difficult to treat  CARE AGREEMENT:   You have the right to help plan your care  Learn about your health condition and how it may be treated  Discuss treatment options with your caregivers to decide what care you want to receive  You always have the right to refuse treatment  © 2017 [employer] Information is for End User's use only and may not be sold, redistributed or otherwise used for commercial purposes   All illustrations and images included in CareNotes® are the copyrighted property of A [employer] M [employer] Inc  or [employer]  The above information is an  only  It is not intended as medical advice for individual conditions or treatments  Talk to your doctor, nurse or pharmacist before following any medical regimen to see if it is safe and effective for you

## 2019-12-09 NOTE — PROGRESS NOTES
UROLOGY FOLLOW-UP NOTE     CHIEF COMPLAINT   Joseph Long is a 62 y o  male with a complaint of   Chief Complaint   Patient presents with    Follow-up    Elevated PSA    abnormal prostate exam       History of Present Illness:     62 y o  male who developed gross hematuria and some voiding dysfunction in January of 2017  The patient has had several occurrences over the last almost 2 years  He thought that he was passing stones although he has never had a history of stone disease in the past   Patient finally sought care with a nephrologist who recommended return to our office  Patient was able to obtain a CT urogram prior to his visit  He has never been a cigarette or cigar smoker and does not use smokeless tobacco   There is a cancer history in his family but not renal or bladder  Patient underwent a bladder tumor resection on the 11/14/2018  Pathology returned low-grade papillary urothelial carcinoma  The patient had difficulty urinating over the 24 hours after surgery  Bear catheter was placed in our office for greater than 1 L  Patient was initially on Flomax but had side effects and stopped the medication  Retention resolved and patient improved  Returns in followup for bladder cancer  Patient is doing well without any concerns or complaints  Continues CaP screening with oscillating PSA  Lab Results   Component Value Date    PSA 4 4 (H) 08/26/2019    PSA 3 9 07/08/2019    PSA 4 7 (H) 07/06/2018     Because of the PSA and some fullness felt on the right side of his prostate, the patient agreed to proceed with biopsy  We discussed options for biopsy any underwent a transperineal biopsy in early October  Of note the patient is a  and has not been sexually active for many years  He is unconcerned with sexual function        Following his diagnosis of prostate cancer in our office, patient did seek out an appointment with radiation oncologist   He is interested in surgical therapy and returns to discuss  He is not having any issues following his biopsy, no urinary bother or hesitancy, minimal nocturia 1 time per night  Past Medical History:     Past Medical History:   Diagnosis Date    Back pain     Bladder cancer ([address] )     Degenerative joint disease involving multiple joints     Gout     Herniated lumbar intervertebral disc     Hypertension     Tinnitus     Urinary retention     Wears glasses     Wears partial dentures     upper denture       PAST SURGICAL HISTORY:     Past Surgical History:   Procedure Laterality Date    CYSTOSCOPY  02/27/2019    WI BIOPSY OF PROSTATE,NEEDLE/PUNCH N/A 10/15/2019    Procedure: Carolina Bermudez NEEDLE BIOPSY PROSTATE, CYSTOSCOPY;  Surgeon: Mirna Reyes MD;  [facility];  Service: Urology    WI CYSTOURETHROSCOPY,FULGUR <0 5 CM LESN N/A 11/14/2018    Procedure: TRANSURETHRAL RESECTION OF BLADDER TUMOR (TURBT); Surgeon: Mirna Reyes MD;  [facility];  Service: Urology    WI INSTILL ANTICANCER AGENT IN BLADDER N/A 11/14/2018    Procedure: Nishant Huitron;  Surgeon: Mirna Reyes MD;  [facility];  Service: Urology    TONSILLECTOMY      WISDOM TOOTH EXTRACTION         CURRENT MEDICATIONS:     Current Outpatient Medications   Medication Sig Dispense Refill    acetaminophen (TYLENOL) 500 mg tablet Take 500 mg by mouth every 6 (six) hours as needed for mild pain      ALLOPURINOL PO Take 200 mg by mouth daily in the early morning       lisinopril (ZESTRIL) 2 5 mg tablet Take 1 tablet (2 5 mg total) by mouth daily in the early morning 90 tablet 3     No current facility-administered medications for this visit  ALLERGIES:   No Known Allergies    SOCIAL HISTORY:     Social History     Socioeconomic History    Marital status:       Spouse name: None    Number of children: None    Years of education: None    Highest education level: None   Occupational History    None   Social Needs    Financial resource strain: None    Food insecurity:     Worry: None     Inability: None    Transportation needs:     Medical: None     Non-medical: None   Tobacco Use    Smoking status: Never Smoker    Smokeless tobacco: Never Used   Substance and Sexual Activity    Alcohol use: Yes     Frequency: 2-4 times a month     Drinks per session: 10 or more     Binge frequency: Weekly     Comment: liquor    Drug use: Never    Sexual activity: None   Lifestyle    Physical activity:     Days per week: None     Minutes per session: None    Stress: None   Relationships    Social connections:     Talks on phone: None     Gets together: None     Attends Gnosticism service: None     Active member of club or organization: None     Attends meetings of clubs or organizations: None     Relationship status: None    Intimate partner violence:     Fear of current or ex partner: None     Emotionally abused: None     Physically abused: None     Forced sexual activity: None   Other Topics Concern    None   Social History Narrative    Does not consume caffeine        SOCIAL HISTORY:     Family History   Problem Relation Age of Onset    Heart attack Father     Throat cancer Father     Cancer Father         throat cancer    Heart disease Mother        REVIEW OF SYSTEMS:     Review of Systems   Constitutional: Negative  Respiratory: Negative  Cardiovascular: Negative  Gastrointestinal: Negative  Genitourinary: Negative  Musculoskeletal: Negative  Skin: Negative  Psychiatric/Behavioral: Negative  PHYSICAL EXAM:     /78 (BP Location: Left arm, Patient Position: Sitting, Cuff Size: Adult)   Pulse 64   Ht 5' 9" (1 753 m)   Wt 91 1 kg (200 lb 12 8 oz)   BMI 29 65 kg/m²     General:  Healthy appearing male in no acute distress  They have a normal affect  There is not appear to be any gross neurologic defects or abnormalities  HEENT:  Normocephalic, atraumatic    Neck is supple without any palpable lymphadenopathy  Cardiovascular:  Patient has normal palpable distal radial pulses  There is no significant peripheral edema  No JVD is noted  Respiratory:  Patient has unlabored respirations  There is no audible wheeze or rhonchi  Abdomen:  Abdomen is without surgical scars  Abdomen is soft and nontender  There is no tympany  Inguinal and umbilical hernia are not appreciated  Genitourinary: no penile lesions or discharge, no testicular masses or tenderness, no hernias    Musculoskeletal:  Patient does not have significant CVA tenderness in the  flank with palpation or percussion  They full range of motion in all 4 extremities  Strength in all 4 extremities appears congruent  Patient is able to ambulate without assistance or difficulty  Dermatologic:  Patient has no skin abnormalities or rashes  LABS:     CBC:   Lab Results   Component Value Date    WBC 7 37 2019    HGB 16 0 2019    HCT 47 3 2019    MCV 96 2019     2019       BMP:   Lab Results   Component Value Date    CALCIUM 8 5 2019    K 3 7 2019    CO2 28 2019     2019    BUN 16 2019    CREATININE 1 11 2019     Lab Results   Component Value Date    PSA 4 4 (H) 2019    PSA 3 9 2019    PSA 4 7 (H) 2018     IMAGIN/19/19  RENAL ULTRASOUND     INDICATION:   R33 8: Other retention of urine  History of prior bladder cancer surgery     COMPARISON: CT renal protocol 2018     TECHNIQUE:   Ultrasound of the retroperitoneum was performed with a curvilinear transducer utilizing volumetric sweeps and still imaging techniques       FINDINGS:     KIDNEYS:  Symmetric and normal size  Right kidney:  11 1 x 5 1 cm  Left kidney:  10 8 x 6 8 cm      Right kidney  Normal echogenicity and contour  No suspicious masses detected  No hydronephrosis  No shadowing calculi    No perinephric fluid collections      Left kidney  Normal echogenicity and contour  No suspicious masses detected  No hydronephrosis  No shadowing calculi  No perinephric fluid collections      URETERS:  Nonvisualized      BLADDER:   Normally distended  No focal thickening or mass lesions  Bilateral ureteral jets detected         IMPRESSION:     Normal       9/12/18  CT ABDOMEN AND PELVIS WITH AND WITHOUT IV CONTRAST     INDICATION:   R31 0: Gross hematuria      COMPARISON:  CT scan 7/10/2018      TECHNIQUE: Initial CT of the abdomen and pelvis was performed without intravenous contrast   Subsequent dynamic CT evaluation of the abdomen and pelvis was performed after the administration of intravenous contrast in both nephrographic and delayed   phases after the administration of intravenous contrast   Axial, sagittal, and coronal 2D reformatted images were created from the source data and submitted for interpretation       Radiation dose length product (DLP) for this visit:  1852 mGy-cm   This examination, like all CT scans performed in the [facility], was performed utilizing techniques to minimize radiation dose exposure, including the use of iterative   reconstruction and automated exposure control      IV Contrast:  100 mL of iohexol (OMNIPAQUE)  Enteric Contrast:  Enteric contrast was not administered      FINDINGS:     ABDOMEN     RIGHT KIDNEY AND URETER:  No solid renal mass  No detectable urothelial mass  No hydronephrosis or hydroureter  No urinary tract calculi  No perinephric collection      LEFT KIDNEY AND URETER:  No solid renal mass  No detectable urothelial mass  No hydronephrosis or hydroureter  No urinary tract calculi  No perinephric collection      URINARY BLADDER:  There is a 3 3 cm soft tissue mass in the left posterior lateral aspect of the bladder, adjacent to the ureteral orifice    No calculi         LOWER CHEST:  No clinically significant abnormality identified in the visualized lower chest      LIVER/BILIARY TREE:  Unremarkable      GALLBLADDER:  No calcified gallstones  No pericholecystic inflammatory change      SPLEEN:  Unremarkable      PANCREAS:  Unremarkable      ADRENAL GLANDS:  Unremarkable      STOMACH AND BOWEL:  There is colonic diverticulosis without evidence of acute diverticulitis      ABDOMINOPELVIC CAVITY:  No ascites  No free intraperitoneal air  No lymphadenopathy      VESSELS:  Unremarkable for patient's age      PELVIS     REPRODUCTIVE ORGANS:  Unremarkable for patient's age      APPENDIX: No findings to suggest appendicitis      ABDOMINAL WALL/INGUINAL REGIONS:  There is a small fat-containing umbilical hernia      OSSEOUS STRUCTURES:  No acute fracture or destructive osseous lesion      IMPRESSION:     3 3 cm mass in the left posterior lateral aspect of the urinary bladder  No evidence of metastatic disease      Colonic diverticulosis  PATHOLOGY:     10/15/19  Final Diagnosis   A  Prostate, right posterior medial, needle biopsy:  -  Prostatic adenocarcinoma, acinar, not otherwise specified; Paron grade 3 +4= score of 7 (grade group 2) in 2 of 2 cores involving 60% of needle core tissue and measuring up to 7 mm in contiguous length (score 4=10%)  -  Periprostatic fat invasion: Not identified   -  Seminal vesicle invasion: Not identified  -  Lymph-vascular invasion: Not identified   -  Perineural invasion: Not identified   -  Additional Pathologic Findings:  None      B  Positive, right posterior lateral, needle biopsy:  -  Prostatic adenocarcinoma, acinar, not otherwise specified; Megan grade 3 +4= score of 7 (grade group 2) in 2 of 2 cores involving 90% of needle core tissue and measuring up to 9 mm in contiguous length (score 4=20-30%)  -  Periprostatic fat invasion: Not identified   -  Seminal vesicle invasion: Not identified  -  Lymph-vascular invasion: Not identified   -  Perineural invasion: Present   -  Additional Pathologic Findings:  None      C  Prostate, right anterior lateral, needle biopsy:  -  Prostatic adenocarcinoma, acinar, not otherwise specified; Elfrida grade 3 +4= score of 7 (grade group 2) in 1 of 2 cores involving 40-50% of needle core tissue and measuring up to 9 mm in contiguous length (score 4=10-20%)  -  Periprostatic fat invasion: Not identified   -  Seminal vesicle invasion: Not identified  -  Lymph-vascular invasion: Not identified   -  Perineural invasion: Not identified   -  Additional Pathologic Findings:  None      D  Prostate, right anterior medial, needle biopsy:  -  Benign prostate tissue, negative for malignancy      E  Prostate, right base, needle biopsy:  -  Focal atypical small acinar proliferation, favor prostatic adenocarcinoma, focus shows crush artifact and is  too small to be further differentiated and appropriately graded      F  Prostate, left posterior medial, needle biopsy:  -  Benign prostate tissue, negative for malignancy      G  Prostate, left posterior lateral, needle biopsy:  -  Benign prostate tissue, negative for malignancy      H  Prostate, left anterior lateral, needle biopsy:  -  Benign prostate tissue, negative for malignancy      I  Prostate, left anterior medial, needle biopsy:  -  Benign prostate tissue, negative for malignancy      J   Prostate, left base, needle biopsy:  -  Benign prostate tissue, negative for malignancy  11/14/18  Final Diagnosis   A  Urinary bladder, excision:             - Noninvasive low-grade papillary urothelial carcinoma               - Uninvolved muscularis propria is identified                 Interpretation performed at [place] [address]        NOMOGRAM:         ASSESSMENT:     62 y o  male with LGTa Bladder Cancer, newly diagnosed cT1c Megan 3+3=7 prostate cancer    PLAN:     1  h/o LG Ta UC bladder, diagnosis 11/2018 -    Patient is next cystoscopy will be due 6 months from his recent operative cystoscopy in May 2020    2  Prostate cancer -  The patient has sought out an appointment with radiation oncologist   He is more interested in surgical therapy  We discussed robotic assisted laparoscopic radical prostatectomy with bilateral pelvic lymph node dissection  We discussed how the procedure is performed and its inherent risks and benefits  Patient is a  and not necessarily concern with sexual function  He is more concerned with his urinary control  I recommended pelvic floor physical therapy pre habilitation  Postprocedural recovery was discussed with the patient and he understands the need for prolonged catheterization  We have discussed risks and benefits and the patient has signed informed consent today  We will plan his surgery in the near future

## 2019-12-30 ENCOUNTER — EVALUATION (OUTPATIENT)
Dept: PHYSICAL THERAPY | Age: 64
End: 2019-12-30
Payer: COMMERCIAL

## 2019-12-30 DIAGNOSIS — N81.89 WEAKNESS OF PELVIC FLOOR: ICD-10-CM

## 2019-12-30 DIAGNOSIS — C61 PROSTATE CANCER (HCC): Primary | ICD-10-CM

## 2019-12-30 PROCEDURE — 97161 PT EVAL LOW COMPLEX 20 MIN: CPT | Performed by: PHYSICAL THERAPIST

## 2019-12-30 PROCEDURE — 97110 THERAPEUTIC EXERCISES: CPT | Performed by: PHYSICAL THERAPIST

## 2019-12-30 NOTE — PROGRESS NOTES
PT Evaluation     Today's date: 2019  Patient name: Angelica Cleary  : 1955  MRN: [id number]  Referring provider: Brandy Feliciano, *  Dx:   Encounter Diagnosis     ICD-10-CM    1  Prostate cancer ([facility] [address] ) C61    2  Weakness of pelvic floor [id number] [id number]        Start Time: 0900  Stop Time: 0957  Total time in clinic (min): 57 minutes    Assessment  Assessment details: Ciarra Villafana is a 62year old male who presents to PT with limited strength and limited understanding of pelvic floor muscles  And their importance in maintaining good urinary function  He will have prostatectomy next month and is referred for pre-op education and home exercises  He performed all exercises in clinic with good technique and good understanding  Patient education also on health bladder habits, use of bladder irritants and urge suppression techniques  He is seen for a one time visit and will continue with an ongoing Hep up until surgery  Impairments: impaired physical strength and lacks appropriate home exercise program  Understanding of Dx/Px/POC: good   Prognosis: good    Goals  ST visit  1  [place]bert in HEP  2  Full understanding of bladder health  Plan  Planned therapy interventions: therapeutic exercise, home exercise program and patient education  Frequency: 1x week  Duration in visits: 1  Duration in weeks: 1        Subjective Evaluation    History of Present Illness  Mechanism of injury: Diagnosed with prostate cancer  Will undergo prostatectomy in 2020  Referred for pre-op pelvic physical therapy    Quality of life: good    Pain  Current pain ratin  Location: low back pain  Quality: dull ache  Relieving factors: medications, change in position and rest  Progression: no change    Social Support  Lives with: alone    Employment status: not working  Hand dominance: right    Patient Goals  Patient goal: pre op exercise education        Objective     Active Range of Motion     Lumbar   Flexion:  WFL  Extension: WFL  Left lateral flexion:  WFL  Right lateral flexion:  WFL  Pelvic Floor Exam     Diastatis   Diastasis recti present? no    Pelvic Floor Muscle Exam     Muscle Contraction: well isolated  Breathing pattern with contraction: within normal limits  Pelvic floor muscle relaxation is delayed  PERFECT Score   Power right: 3/5  Power left: 3/5  Endurance (seconds to max): 5  Repetitions (before fatigue): 8  Fast flicks (in 10 seconds): 7               Precautions: prostate cancer, HTN, bladder cancer, LBP      Manual                                                                                   Exercise Diary  12/30            Endurance contraction 5"/10"  5x            Quick contractio 15x                         Ball with kegel 3"  10x            Band  20x                         Roll in/out 10x            plie 5x                         HEP As above                         bladder health ed 15                                                                                                                        Modalities                                                      Initial evaluation only with Hep instruction  Discharge PT to self care

## 2020-01-09 ENCOUNTER — OFFICE VISIT (OUTPATIENT)
Dept: UROLOGY | Facility: CLINIC | Age: 65
End: 2020-01-09
Payer: COMMERCIAL

## 2020-01-09 VITALS
HEART RATE: 66 BPM | DIASTOLIC BLOOD PRESSURE: 78 MMHG | WEIGHT: 205.47 LBS | BODY MASS INDEX: 30.34 KG/M2 | SYSTOLIC BLOOD PRESSURE: 130 MMHG

## 2020-01-09 DIAGNOSIS — C61 PROSTATE CANCER (HCC): Primary | ICD-10-CM

## 2020-01-09 PROCEDURE — 99213 OFFICE O/P EST LOW 20 MIN: CPT | Performed by: PHYSICIAN ASSISTANT

## 2020-01-09 PROCEDURE — 87086 URINE CULTURE/COLONY COUNT: CPT | Performed by: PHYSICIAN ASSISTANT

## 2020-01-09 NOTE — H&P (VIEW-ONLY)
1/9/2020      Chief Complaint   Patient presents with    Prostate Cancer    Pre-op Exam         Assessment and Plan    62 y o  male managed by Dr Claudene Fiscal    1  Bladder cancer  - noninvasive low-grade papillary urothelial carcinoma status post TURBT 11/14/2018  - surveillance cystoscopies 02/27/2019, 06/04/2019, 10/15/2019 negative for recurrence  - surveillance cystoscopy due May 2020    2  cT1c Prostate cancer [place] 3+4=7  - pre-treatment PSA 4 4 (08/26/2019)  - scheduled for but robotic assist laparoscopic prostatectomy 01/27/2020  - continue pelvic PT prehabilitation exercises      History of Present Illness  Nikki Washington is a 62 y o  male here for evaluation of -- Patient is here today for preoperative H&P for upcoming RALP with Dr Claudene Fiscal scheduled for 01/27/2020 @ [facility] for treatment of his newly diagnosed cT1c [place] 3+4=7 prostate adenocarcinoma  Patient understands the risks benefits and technique of the procedure as previously discussed and consented to with attending surgeon Dr Claudene Fiscal  He understands his pre-op orders to include his lab work and bowel prep  Preoperative urine culture was collected today  Denies any overall changes in his health and no prior adverse effects of anesthesia  He has completed one formal session of pelvic floor pre-habilitation and continues exercises daily at home  Has no bothersome voiding symptoms at baseline  All questions answered to the best my ability, patient will proceed to surgery as planned  Review of Systems   Constitutional: Negative  Respiratory: Negative  Cardiovascular: Negative  Genitourinary: Negative for decreased urine volume, difficulty urinating, dysuria, flank pain, frequency, hematuria and urgency  Musculoskeletal: Negative                   Past Medical History  Past Medical History:   Diagnosis Date    Back pain     Bladder cancer ([address] )     Degenerative joint disease involving multiple joints     Gout     Herniated lumbar intervertebral disc     Hypertension     Tinnitus     Urinary retention     Wears glasses     Wears partial dentures     upper denture     Past Social History  Past Surgical History:   Procedure Laterality Date    CYSTOSCOPY  02/27/2019    IA BIOPSY OF PROSTATE,NEEDLE/PUNCH N/A 10/15/2019    Procedure: Aramis Irizarry NEEDLE BIOPSY PROSTATE, CYSTOSCOPY;  Surgeon: Roby Walters MD;  [facility];  Service: Urology    IA CYSTOURETHROSCOPY,FULGUR <0 5 CM LESN N/A 11/14/2018    Procedure: TRANSURETHRAL RESECTION OF BLADDER TUMOR (TURBT); Surgeon: Roby Walters MD;  [facility];  Service: Urology    IA INSTILL ANTICANCER AGENT IN BLADDER N/A 11/14/2018    Procedure: Shalini Wood;  Surgeon: Roby Walters MD;  [facility];  Service: Urology    TONSILLECTOMY      WISDOM TOOTH EXTRACTION       Social History     Tobacco Use   Smoking Status Never Smoker   Smokeless Tobacco Never Used     Past Family History  Family History   Problem Relation Age of Onset    Heart attack Father     Throat cancer Father     Cancer Father         throat cancer    Heart disease Mother      Past Social history  Social History     Socioeconomic History    Marital status:       Spouse name: Not on file    Number of children: Not on file    Years of education: Not on file    Highest education level: Not on file   Occupational History    Not on file   Social Needs    Financial resource strain: Not on file    Food insecurity:     Worry: Not on file     Inability: Not on file    Transportation needs:     Medical: Not on file     Non-medical: Not on file   Tobacco Use    Smoking status: Never Smoker    Smokeless tobacco: Never Used   Substance and Sexual Activity    Alcohol use: Yes     Frequency: 2-4 times a month     Drinks per session: 10 or more     Binge frequency: Weekly     Comment: liquor    Drug use: Never    Sexual activity: Not on file   Lifestyle    Physical activity:     Days per week: Not on file     Minutes per session: Not on file    Stress: Not on file   Relationships    Social connections:     Talks on phone: Not on file     Gets together: Not on file     Attends [employer] service: Not on file     Active member of club or organization: Not on file     Attends meetings of clubs or organizations: Not on file     Relationship status: Not on file    Intimate partner violence:     Fear of current or ex partner: Not on file     Emotionally abused: Not on file     Physically abused: Not on file     Forced sexual activity: Not on file   Other Topics Concern    Not on file   Social History Narrative    Does not consume caffeine      Current Medications  Current Outpatient Medications   Medication Sig Dispense Refill    acetaminophen (TYLENOL) 500 mg tablet Take 500 mg by mouth every 6 (six) hours as needed for mild pain      ALLOPURINOL PO Take 200 mg by mouth daily in the early morning       lisinopril (ZESTRIL) 2 5 mg tablet Take 1 tablet (2 5 mg total) by mouth daily in the early morning 90 tablet 3     No current facility-administered medications for this visit  Allergies  No Known Allergies      The following portions of the patient's history were reviewed and updated as appropriate: allergies, current medications, past medical history, past social history, past surgical history and problem list       Vitals  Vitals:    01/09/20 1455   BP: 130/78   Pulse: 66   Weight: 93 2 kg (205 lb 7 5 oz)       Physical Exam   Constitutional: He is oriented to person, place, and time  He appears well-developed and well-nourished  HENT:   Head: Normocephalic and atraumatic  Cardiovascular: Normal rate, regular rhythm and normal heart sounds  No murmur heard  Pulmonary/Chest: Effort normal and breath sounds normal    Abdominal: Soft  Bowel sounds are normal    Musculoskeletal: Normal range of motion   He exhibits no edema  Neurological: He is alert and oriented to person, place, and time  Skin: Skin is warm and dry  Capillary refill takes less than 2 seconds  No pallor  Nursing note and vitals reviewed          Results  No results found for this or any previous visit (from the past 1 hour(s)) ]  Lab Results   Component Value Date    PSA 4 4 (H) 08/26/2019    PSA 3 9 07/08/2019    PSA 4 7 (H) 07/06/2018     Lab Results   Component Value Date    CALCIUM 8 5 09/30/2019    K 3 7 09/30/2019    CO2 28 09/30/2019     09/30/2019    BUN 16 09/30/2019    CREATININE 1 11 09/30/2019     Lab Results   Component Value Date    WBC 7 37 09/30/2019    HGB 16 0 09/30/2019    HCT 47 3 09/30/2019    MCV 96 09/30/2019     09/30/2019         Orders  Orders Placed This Encounter   Procedures    Urine culture

## 2020-01-09 NOTE — PROGRESS NOTES
1/9/2020      Chief Complaint   Patient presents with    Prostate Cancer    Pre-op Exam         Assessment and Plan    62 y o  male managed by Dr Shefali Temple    1  Bladder cancer  - noninvasive low-grade papillary urothelial carcinoma status post TURBT 11/14/2018  - surveillance cystoscopies 02/27/2019, 06/04/2019, 10/15/2019 negative for recurrence  - surveillance cystoscopy due May 2020    2  cT1c Prostate cancer [place] 3+4=7  - pre-treatment PSA 4 4 (08/26/2019)  - scheduled for but robotic assist laparoscopic prostatectomy 01/27/2020  - continue pelvic PT prehabilitation exercises      History of Present Illness  Gisela Myrick is a 62 y o  male here for evaluation of -- Patient is here today for preoperative H&P for upcoming RALP with Dr Shefali Temple scheduled for 01/27/2020 @ [facility] for treatment of his newly diagnosed cT1c [place] 3+4=7 prostate adenocarcinoma  Patient understands the risks benefits and technique of the procedure as previously discussed and consented to with attending surgeon Dr Shefali Temple  He understands his pre-op orders to include his lab work and bowel prep  Preoperative urine culture was collected today  Denies any overall changes in his health and no prior adverse effects of anesthesia  He has completed one formal session of pelvic floor pre-habilitation and continues exercises daily at home  Has no bothersome voiding symptoms at baseline  All questions answered to the best my ability, patient will proceed to surgery as planned  Review of Systems   Constitutional: Negative  Respiratory: Negative  Cardiovascular: Negative  Genitourinary: Negative for decreased urine volume, difficulty urinating, dysuria, flank pain, frequency, hematuria and urgency  Musculoskeletal: Negative                   Past Medical History  Past Medical History:   Diagnosis Date    Back pain     Bladder cancer ([address] )     Degenerative joint disease involving multiple joints     Gout     Herniated lumbar intervertebral disc     Hypertension     Tinnitus     Urinary retention     Wears glasses     Wears partial dentures     upper denture     Past Social History  Past Surgical History:   Procedure Laterality Date    CYSTOSCOPY  02/27/2019    MT BIOPSY OF PROSTATE,NEEDLE/PUNCH N/A 10/15/2019    Procedure: Sedrick Koyanagi NEEDLE BIOPSY PROSTATE, CYSTOSCOPY;  Surgeon: Belinda Cardoso MD;  [facility];  Service: Urology    MT CYSTOURETHROSCOPY,FULGUR <0 5 CM LESN N/A 11/14/2018    Procedure: TRANSURETHRAL RESECTION OF BLADDER TUMOR (TURBT); Surgeon: Belinda Cardoso MD;  [facility];  Service: Urology    MT INSTILL ANTICANCER AGENT IN BLADDER N/A 11/14/2018    Procedure: Delmar Knott;  Surgeon: Belinda Cardoso MD;  [facility];  Service: Urology    TONSILLECTOMY      WISDOM TOOTH EXTRACTION       Social History     Tobacco Use   Smoking Status Never Smoker   Smokeless Tobacco Never Used     Past Family History  Family History   Problem Relation Age of Onset    Heart attack Father     Throat cancer Father     Cancer Father         throat cancer    Heart disease Mother      Past Social history  Social History     Socioeconomic History    Marital status:       Spouse name: Not on file    Number of children: Not on file    Years of education: Not on file    Highest education level: Not on file   Occupational History    Not on file   Social Needs    Financial resource strain: Not on file    Food insecurity:     Worry: Not on file     Inability: Not on file    Transportation needs:     Medical: Not on file     Non-medical: Not on file   Tobacco Use    Smoking status: Never Smoker    Smokeless tobacco: Never Used   Substance and Sexual Activity    Alcohol use: Yes     Frequency: 2-4 times a month     Drinks per session: 10 or more     Binge frequency: Weekly     Comment: liquor    Drug use: Never    Sexual activity: Not on file   Lifestyle    Physical activity:     Days per week: Not on file     Minutes per session: Not on file    Stress: Not on file   Relationships    Social connections:     Talks on phone: Not on file     Gets together: Not on file     Attends [employer] service: Not on file     Active member of club or organization: Not on file     Attends meetings of clubs or organizations: Not on file     Relationship status: Not on file    Intimate partner violence:     Fear of current or ex partner: Not on file     Emotionally abused: Not on file     Physically abused: Not on file     Forced sexual activity: Not on file   Other Topics Concern    Not on file   Social History Narrative    Does not consume caffeine      Current Medications  Current Outpatient Medications   Medication Sig Dispense Refill    acetaminophen (TYLENOL) 500 mg tablet Take 500 mg by mouth every 6 (six) hours as needed for mild pain      ALLOPURINOL PO Take 200 mg by mouth daily in the early morning       lisinopril (ZESTRIL) 2 5 mg tablet Take 1 tablet (2 5 mg total) by mouth daily in the early morning 90 tablet 3     No current facility-administered medications for this visit  Allergies  No Known Allergies      The following portions of the patient's history were reviewed and updated as appropriate: allergies, current medications, past medical history, past social history, past surgical history and problem list       Vitals  Vitals:    01/09/20 1455   BP: 130/78   Pulse: 66   Weight: 93 2 kg (205 lb 7 5 oz)       Physical Exam   Constitutional: He is oriented to person, place, and time  He appears well-developed and well-nourished  HENT:   Head: Normocephalic and atraumatic  Cardiovascular: Normal rate, regular rhythm and normal heart sounds  No murmur heard  Pulmonary/Chest: Effort normal and breath sounds normal    Abdominal: Soft  Bowel sounds are normal    Musculoskeletal: Normal range of motion   He exhibits no edema  Neurological: He is alert and oriented to person, place, and time  Skin: Skin is warm and dry  Capillary refill takes less than 2 seconds  No pallor  Nursing note and vitals reviewed          Results  No results found for this or any previous visit (from the past 1 hour(s)) ]  Lab Results   Component Value Date    PSA 4 4 (H) 08/26/2019    PSA 3 9 07/08/2019    PSA 4 7 (H) 07/06/2018     Lab Results   Component Value Date    CALCIUM 8 5 09/30/2019    K 3 7 09/30/2019    CO2 28 09/30/2019     09/30/2019    BUN 16 09/30/2019    CREATININE 1 11 09/30/2019     Lab Results   Component Value Date    WBC 7 37 09/30/2019    HGB 16 0 09/30/2019    HCT 47 3 09/30/2019    MCV 96 09/30/2019     09/30/2019         Orders  Orders Placed This Encounter   Procedures    Urine culture

## 2020-01-10 LAB — BACTERIA UR CULT: NORMAL

## 2020-01-13 ENCOUNTER — ANESTHESIA EVENT (OUTPATIENT)
Dept: PERIOP | Facility: HOSPITAL | Age: 65
DRG: 708 | End: 2020-01-13
Payer: COMMERCIAL

## 2020-01-13 RX ORDER — SODIUM CHLORIDE 9 MG/ML
125 INJECTION, SOLUTION INTRAVENOUS CONTINUOUS
Status: CANCELLED | OUTPATIENT
Start: 2020-01-27

## 2020-01-14 ENCOUNTER — APPOINTMENT (OUTPATIENT)
Dept: LAB | Facility: HOSPITAL | Age: 65
DRG: 708 | End: 2020-01-14
Attending: UROLOGY
Payer: COMMERCIAL

## 2020-01-14 ENCOUNTER — APPOINTMENT (OUTPATIENT)
Dept: PREADMISSION TESTING | Facility: HOSPITAL | Age: 65
DRG: 708 | End: 2020-01-14
Payer: COMMERCIAL

## 2020-01-14 DIAGNOSIS — C61 PROSTATE CANCER (HCC): ICD-10-CM

## 2020-01-14 LAB
ABO GROUP BLD: NORMAL
ANION GAP SERPL CALCULATED.3IONS-SCNC: 5 MMOL/L (ref 4–13)
APTT PPP: 36 SECONDS (ref 23–37)
BASOPHILS # BLD AUTO: 0.08 THOUSANDS/ΜL (ref 0–0.1)
BASOPHILS NFR BLD AUTO: 1 % (ref 0–1)
BLD GP AB SCN SERPL QL: NEGATIVE
BUN SERPL-MCNC: 13 MG/DL (ref 5–25)
CALCIUM SERPL-MCNC: 9.3 MG/DL (ref 8.3–10.1)
CHLORIDE SERPL-SCNC: 106 MMOL/L (ref 100–108)
CO2 SERPL-SCNC: 31 MMOL/L (ref 21–32)
CREAT SERPL-MCNC: 1.18 MG/DL (ref 0.6–1.3)
EOSINOPHIL # BLD AUTO: 0.09 THOUSAND/ΜL (ref 0–0.61)
EOSINOPHIL NFR BLD AUTO: 1 % (ref 0–6)
ERYTHROCYTE [DISTWIDTH] IN BLOOD BY AUTOMATED COUNT: 12.8 % (ref 11.6–15.1)
GFR SERPL CREATININE-BSD FRML MDRD: 65 ML/MIN/1.73SQ M
GLUCOSE P FAST SERPL-MCNC: 88 MG/DL (ref 65–99)
HCT VFR BLD AUTO: 48.1 % (ref 36.5–49.3)
HGB BLD-MCNC: 16.3 G/DL (ref 12–17)
IMM GRANULOCYTES # BLD AUTO: 0.04 THOUSAND/UL (ref 0–0.2)
IMM GRANULOCYTES NFR BLD AUTO: 1 % (ref 0–2)
INR PPP: 0.96 (ref 0.84–1.19)
LYMPHOCYTES # BLD AUTO: 2.4 THOUSANDS/ΜL (ref 0.6–4.47)
LYMPHOCYTES NFR BLD AUTO: 29 % (ref 14–44)
MCH RBC QN AUTO: 33.1 PG (ref 26.8–34.3)
MCHC RBC AUTO-ENTMCNC: 33.9 G/DL (ref 31.4–37.4)
MCV RBC AUTO: 98 FL (ref 82–98)
MONOCYTES # BLD AUTO: 0.96 THOUSAND/ΜL (ref 0.17–1.22)
MONOCYTES NFR BLD AUTO: 12 % (ref 4–12)
NEUTROPHILS # BLD AUTO: 4.75 THOUSANDS/ΜL (ref 1.85–7.62)
NEUTS SEG NFR BLD AUTO: 56 % (ref 43–75)
NRBC BLD AUTO-RTO: 0 /100 WBCS
PLATELET # BLD AUTO: 249 THOUSANDS/UL (ref 149–390)
PMV BLD AUTO: 9.2 FL (ref 8.9–12.7)
POTASSIUM SERPL-SCNC: 4.6 MMOL/L (ref 3.5–5.3)
PROTHROMBIN TIME: 12.9 SECONDS (ref 11.6–14.5)
RBC # BLD AUTO: 4.93 MILLION/UL (ref 3.88–5.62)
RH BLD: POSITIVE
SODIUM SERPL-SCNC: 142 MMOL/L (ref 136–145)
SPECIMEN EXPIRATION DATE: NORMAL
WBC # BLD AUTO: 8.32 THOUSAND/UL (ref 4.31–10.16)

## 2020-01-14 PROCEDURE — 86850 RBC ANTIBODY SCREEN: CPT

## 2020-01-14 PROCEDURE — 86920 COMPATIBILITY TEST SPIN: CPT

## 2020-01-14 PROCEDURE — 80048 BASIC METABOLIC PNL TOTAL CA: CPT

## 2020-01-14 PROCEDURE — 85610 PROTHROMBIN TIME: CPT

## 2020-01-14 PROCEDURE — 85025 COMPLETE CBC W/AUTO DIFF WBC: CPT

## 2020-01-14 PROCEDURE — 36415 COLL VENOUS BLD VENIPUNCTURE: CPT

## 2020-01-14 PROCEDURE — 85730 THROMBOPLASTIN TIME PARTIAL: CPT

## 2020-01-14 PROCEDURE — 86900 BLOOD TYPING SEROLOGIC ABO: CPT

## 2020-01-14 PROCEDURE — 86901 BLOOD TYPING SEROLOGIC RH(D): CPT

## 2020-01-14 NOTE — ANESTHESIA PREPROCEDURE EVALUATION
Review of Systems/Medical History  Patient summary reviewed  Chart reviewed  No history of anesthetic complications     Cardiovascular  Exercise tolerance (METS): >4,  Hypertension controlled, No angina , No SCHNEIDER,    Pulmonary  Negative pulmonary ROS Not a smoker , No shortness of breath, No recent URI ,        GI/Hepatic  Negative GI/hepatic ROS          Genitourinary malignancy Bladder cancer, Prostatic disorder, benign prostatic hyperplasia       Endo/Other    Obesity    GYN  Negative gynecology ROS          Hematology  Negative hematology ROS      Musculoskeletal  Gout,   Arthritis     Neurology  Negative neurology ROS      Psychology   Negative psychology ROS              Physical Exam    Airway    Mallampati score: I  TM Distance: >3 FB  Neck ROM: full     Dental   Comment: Denies loose/chipped teeth, upper dentures,     Cardiovascular  Rhythm: regular, Rate: normal, Cardiovascular exam normal    Pulmonary  Pulmonary exam normal Breath sounds clear to auscultation,     Other Findings        Anesthesia Plan  ASA Score- 2     Anesthesia Type- general with ASA Monitors  Additional Monitors:   Airway Plan: ETT  Plan Factors-Patient not instructed to abstain from smoking on day of procedure  Patient did not smoke on day of surgery  Induction- intravenous  Postoperative Plan- Plan for postoperative opioid use  Planned trial extubation    Informed Consent- Anesthetic plan and risks discussed with patient

## 2020-01-14 NOTE — PRE-PROCEDURE INSTRUCTIONS
Pre-Surgery Instructions:   Medication Instructions    acetaminophen (TYLENOL) 500 mg tablet Instructed patient per Anesthesia Guidelines   ALLOPURINOL PO Instructed patient per Anesthesia Guidelines   lisinopril (ZESTRIL) 2 5 mg tablet Instructed patient per Anesthesia Guidelines  No meds needed am of surgery per anesthesia DR Yazmin Chang

## 2020-01-27 ENCOUNTER — HOSPITAL ENCOUNTER (INPATIENT)
Facility: HOSPITAL | Age: 65
LOS: 1 days | Discharge: HOME HEALTH | DRG: 708 | End: 2020-01-28
Attending: UROLOGY | Admitting: UROLOGY
Payer: COMMERCIAL

## 2020-01-27 ENCOUNTER — ANESTHESIA (OUTPATIENT)
Dept: PERIOP | Facility: HOSPITAL | Age: 65
DRG: 708 | End: 2020-01-27
Payer: COMMERCIAL

## 2020-01-27 DIAGNOSIS — C61 PROSTATE CANCER (HCC): ICD-10-CM

## 2020-01-27 LAB
ABO GROUP BLD: NORMAL
RH BLD: POSITIVE

## 2020-01-27 PROCEDURE — 38571 LAPAROSCOPY LYMPHADENECTOMY: CPT | Performed by: UROLOGY

## 2020-01-27 PROCEDURE — 88309 TISSUE EXAM BY PATHOLOGIST: CPT | Performed by: PATHOLOGY

## 2020-01-27 PROCEDURE — 55866 LAPS SURG PRST8ECT RPBIC RAD: CPT | Performed by: PHYSICIAN ASSISTANT

## 2020-01-27 PROCEDURE — 8E0W4CZ ROBOTIC ASSISTED PROCEDURE OF TRUNK REGION, PERCUTANEOUS ENDOSCOPIC APPROACH: ICD-10-PCS | Performed by: UROLOGY

## 2020-01-27 PROCEDURE — 55866 LAPS SURG PRST8ECT RPBIC RAD: CPT | Performed by: UROLOGY

## 2020-01-27 PROCEDURE — 0VT04ZZ RESECTION OF PROSTATE, PERCUTANEOUS ENDOSCOPIC APPROACH: ICD-10-PCS | Performed by: UROLOGY

## 2020-01-27 PROCEDURE — 38571 LAPAROSCOPY LYMPHADENECTOMY: CPT | Performed by: PHYSICIAN ASSISTANT

## 2020-01-27 PROCEDURE — 88307 TISSUE EXAM BY PATHOLOGIST: CPT | Performed by: PATHOLOGY

## 2020-01-27 PROCEDURE — 88305 TISSUE EXAM BY PATHOLOGIST: CPT | Performed by: PATHOLOGY

## 2020-01-27 PROCEDURE — 86900 BLOOD TYPING SEROLOGIC ABO: CPT | Performed by: UROLOGY

## 2020-01-27 PROCEDURE — 86901 BLOOD TYPING SEROLOGIC RH(D): CPT | Performed by: UROLOGY

## 2020-01-27 PROCEDURE — 0VT34ZZ RESECTION OF BILATERAL SEMINAL VESICLES, PERCUTANEOUS ENDOSCOPIC APPROACH: ICD-10-PCS | Performed by: UROLOGY

## 2020-01-27 PROCEDURE — 07BC4ZX EXCISION OF PELVIS LYMPHATIC, PERCUTANEOUS ENDOSCOPIC APPROACH, DIAGNOSTIC: ICD-10-PCS | Performed by: UROLOGY

## 2020-01-27 RX ORDER — CEFAZOLIN SODIUM 2 G/50ML
2000 SOLUTION INTRAVENOUS ONCE
Status: COMPLETED | OUTPATIENT
Start: 2020-01-27 | End: 2020-01-27

## 2020-01-27 RX ORDER — SODIUM CHLORIDE, SODIUM LACTATE, POTASSIUM CHLORIDE, CALCIUM CHLORIDE 600; 310; 30; 20 MG/100ML; MG/100ML; MG/100ML; MG/100ML
125 INJECTION, SOLUTION INTRAVENOUS CONTINUOUS
Status: DISCONTINUED | OUTPATIENT
Start: 2020-01-27 | End: 2020-01-28 | Stop reason: HOSPADM

## 2020-01-27 RX ORDER — HYDROCODONE BITARTRATE AND ACETAMINOPHEN 5; 325 MG/1; MG/1
2 TABLET ORAL EVERY 4 HOURS PRN
Status: DISCONTINUED | OUTPATIENT
Start: 2020-01-27 | End: 2020-01-28 | Stop reason: HOSPADM

## 2020-01-27 RX ORDER — MAGNESIUM HYDROXIDE 1200 MG/15ML
LIQUID ORAL AS NEEDED
Status: DISCONTINUED | OUTPATIENT
Start: 2020-01-27 | End: 2020-01-27 | Stop reason: HOSPADM

## 2020-01-27 RX ORDER — DEXAMETHASONE SODIUM PHOSPHATE 4 MG/ML
INJECTION, SOLUTION INTRA-ARTICULAR; INTRALESIONAL; INTRAMUSCULAR; INTRAVENOUS; SOFT TISSUE AS NEEDED
Status: DISCONTINUED | OUTPATIENT
Start: 2020-01-27 | End: 2020-01-27 | Stop reason: SURG

## 2020-01-27 RX ORDER — ONDANSETRON 2 MG/ML
4 INJECTION INTRAMUSCULAR; INTRAVENOUS EVERY 6 HOURS PRN
Status: DISCONTINUED | OUTPATIENT
Start: 2020-01-27 | End: 2020-01-27 | Stop reason: HOSPADM

## 2020-01-27 RX ORDER — OXYBUTYNIN CHLORIDE 10 MG/1
10 TABLET, EXTENDED RELEASE ORAL DAILY
Status: DISCONTINUED | OUTPATIENT
Start: 2020-01-27 | End: 2020-01-28 | Stop reason: HOSPADM

## 2020-01-27 RX ORDER — GLYCOPYRROLATE 0.2 MG/ML
INJECTION INTRAMUSCULAR; INTRAVENOUS AS NEEDED
Status: DISCONTINUED | OUTPATIENT
Start: 2020-01-27 | End: 2020-01-27 | Stop reason: SURG

## 2020-01-27 RX ORDER — ONDANSETRON 2 MG/ML
INJECTION INTRAMUSCULAR; INTRAVENOUS AS NEEDED
Status: DISCONTINUED | OUTPATIENT
Start: 2020-01-27 | End: 2020-01-27 | Stop reason: SURG

## 2020-01-27 RX ORDER — SODIUM CHLORIDE 9 MG/ML
125 INJECTION, SOLUTION INTRAVENOUS CONTINUOUS
Status: DISCONTINUED | OUTPATIENT
Start: 2020-01-27 | End: 2020-01-27

## 2020-01-27 RX ORDER — FENTANYL CITRATE 50 UG/ML
25 INJECTION, SOLUTION INTRAMUSCULAR; INTRAVENOUS
Status: DISCONTINUED | OUTPATIENT
Start: 2020-01-27 | End: 2020-01-27 | Stop reason: HOSPADM

## 2020-01-27 RX ORDER — OXYBUTYNIN CHLORIDE 10 MG/1
10 TABLET, EXTENDED RELEASE ORAL DAILY
Qty: 14 TABLET | Refills: 0 | Status: SHIPPED | OUTPATIENT
Start: 2020-01-27 | End: 2020-05-22

## 2020-01-27 RX ORDER — HYDROCODONE BITARTRATE AND ACETAMINOPHEN 5; 325 MG/1; MG/1
2 TABLET ORAL EVERY 4 HOURS PRN
Qty: 20 TABLET | Refills: 0 | Status: SHIPPED | OUTPATIENT
Start: 2020-01-27 | End: 2020-10-14

## 2020-01-27 RX ORDER — ATROPA BELLADONNA AND OPIUM 16.2; 3 MG/1; MG/1
30 SUPPOSITORY RECTAL ONCE
Status: DISCONTINUED | OUTPATIENT
Start: 2020-01-27 | End: 2020-01-27

## 2020-01-27 RX ORDER — DOCUSATE SODIUM 100 MG/1
100 CAPSULE, LIQUID FILLED ORAL 2 TIMES DAILY
Qty: 10 CAPSULE | Refills: 0 | Status: SHIPPED | OUTPATIENT
Start: 2020-01-27 | End: 2020-10-14

## 2020-01-27 RX ORDER — FENTANYL CITRATE 50 UG/ML
INJECTION, SOLUTION INTRAMUSCULAR; INTRAVENOUS AS NEEDED
Status: DISCONTINUED | OUTPATIENT
Start: 2020-01-27 | End: 2020-01-27 | Stop reason: SURG

## 2020-01-27 RX ORDER — MIDAZOLAM HYDROCHLORIDE 2 MG/2ML
INJECTION, SOLUTION INTRAMUSCULAR; INTRAVENOUS AS NEEDED
Status: DISCONTINUED | OUTPATIENT
Start: 2020-01-27 | End: 2020-01-27 | Stop reason: SURG

## 2020-01-27 RX ORDER — ALLOPURINOL 100 MG/1
200 TABLET ORAL
Status: DISCONTINUED | OUTPATIENT
Start: 2020-01-27 | End: 2020-01-28 | Stop reason: HOSPADM

## 2020-01-27 RX ORDER — HYDROMORPHONE HCL/PF 1 MG/ML
SYRINGE (ML) INJECTION AS NEEDED
Status: DISCONTINUED | OUTPATIENT
Start: 2020-01-27 | End: 2020-01-27 | Stop reason: SURG

## 2020-01-27 RX ORDER — DOCUSATE SODIUM 100 MG/1
100 CAPSULE, LIQUID FILLED ORAL 2 TIMES DAILY
Status: DISCONTINUED | OUTPATIENT
Start: 2020-01-27 | End: 2020-01-28 | Stop reason: HOSPADM

## 2020-01-27 RX ORDER — CEFAZOLIN SODIUM 1 G/50ML
1000 SOLUTION INTRAVENOUS EVERY 8 HOURS
Status: COMPLETED | OUTPATIENT
Start: 2020-01-27 | End: 2020-01-28

## 2020-01-27 RX ORDER — ROCURONIUM BROMIDE 10 MG/ML
INJECTION, SOLUTION INTRAVENOUS AS NEEDED
Status: DISCONTINUED | OUTPATIENT
Start: 2020-01-27 | End: 2020-01-27 | Stop reason: SURG

## 2020-01-27 RX ORDER — NEOSTIGMINE METHYLSULFATE 1 MG/ML
INJECTION INTRAVENOUS AS NEEDED
Status: DISCONTINUED | OUTPATIENT
Start: 2020-01-27 | End: 2020-01-27 | Stop reason: SURG

## 2020-01-27 RX ORDER — HYDROMORPHONE HCL/PF 1 MG/ML
0.5 SYRINGE (ML) INJECTION
Status: DISCONTINUED | OUTPATIENT
Start: 2020-01-27 | End: 2020-01-27 | Stop reason: HOSPADM

## 2020-01-27 RX ORDER — SIMETHICONE 80 MG
80 TABLET,CHEWABLE ORAL 4 TIMES DAILY PRN
Status: DISCONTINUED | OUTPATIENT
Start: 2020-01-27 | End: 2020-01-28 | Stop reason: HOSPADM

## 2020-01-27 RX ORDER — ONDANSETRON 2 MG/ML
4 INJECTION INTRAMUSCULAR; INTRAVENOUS EVERY 6 HOURS PRN
Status: DISCONTINUED | OUTPATIENT
Start: 2020-01-27 | End: 2020-01-28 | Stop reason: HOSPADM

## 2020-01-27 RX ORDER — HYDROCODONE BITARTRATE AND ACETAMINOPHEN 5; 325 MG/1; MG/1
1 TABLET ORAL EVERY 4 HOURS PRN
Status: DISCONTINUED | OUTPATIENT
Start: 2020-01-27 | End: 2020-01-28 | Stop reason: HOSPADM

## 2020-01-27 RX ORDER — ATROPA BELLADONNA AND OPIUM 16.2; 3 MG/1; MG/1
SUPPOSITORY RECTAL AS NEEDED
Status: DISCONTINUED | OUTPATIENT
Start: 2020-01-27 | End: 2020-01-27 | Stop reason: HOSPADM

## 2020-01-27 RX ORDER — SODIUM CHLORIDE 9 MG/ML
INJECTION, SOLUTION INTRAVENOUS CONTINUOUS PRN
Status: DISCONTINUED | OUTPATIENT
Start: 2020-01-27 | End: 2020-01-27 | Stop reason: SURG

## 2020-01-27 RX ORDER — GABAPENTIN 100 MG/1
100 CAPSULE ORAL 3 TIMES DAILY
Status: DISCONTINUED | OUTPATIENT
Start: 2020-01-27 | End: 2020-01-28 | Stop reason: HOSPADM

## 2020-01-27 RX ORDER — PROPOFOL 10 MG/ML
INJECTION, EMULSION INTRAVENOUS AS NEEDED
Status: DISCONTINUED | OUTPATIENT
Start: 2020-01-27 | End: 2020-01-27 | Stop reason: SURG

## 2020-01-27 RX ORDER — BUPIVACAINE HYDROCHLORIDE 5 MG/ML
INJECTION, SOLUTION PERINEURAL AS NEEDED
Status: DISCONTINUED | OUTPATIENT
Start: 2020-01-27 | End: 2020-01-27 | Stop reason: HOSPADM

## 2020-01-27 RX ORDER — HYDROMORPHONE HCL/PF 1 MG/ML
0.5 SYRINGE (ML) INJECTION EVERY 2 HOUR PRN
Status: DISCONTINUED | OUTPATIENT
Start: 2020-01-27 | End: 2020-01-28 | Stop reason: HOSPADM

## 2020-01-27 RX ADMIN — SODIUM CHLORIDE 125 ML/HR: 0.9 INJECTION, SOLUTION INTRAVENOUS at 06:05

## 2020-01-27 RX ADMIN — HYDROMORPHONE HYDROCHLORIDE 0.5 MG: 1 INJECTION, SOLUTION INTRAMUSCULAR; INTRAVENOUS; SUBCUTANEOUS at 09:19

## 2020-01-27 RX ADMIN — MIDAZOLAM 2 MG: 1 INJECTION INTRAMUSCULAR; INTRAVENOUS at 07:22

## 2020-01-27 RX ADMIN — FENTANYL CITRATE 50 MCG: 50 INJECTION, SOLUTION INTRAMUSCULAR; INTRAVENOUS at 10:29

## 2020-01-27 RX ADMIN — CEFAZOLIN SODIUM 2000 MG: 2 SOLUTION INTRAVENOUS at 07:22

## 2020-01-27 RX ADMIN — FENTANYL CITRATE 50 MCG: 50 INJECTION, SOLUTION INTRAMUSCULAR; INTRAVENOUS at 08:52

## 2020-01-27 RX ADMIN — CEFAZOLIN SODIUM 1000 MG: 1 SOLUTION INTRAVENOUS at 23:36

## 2020-01-27 RX ADMIN — DOCUSATE SODIUM 100 MG: 100 CAPSULE, LIQUID FILLED ORAL at 13:02

## 2020-01-27 RX ADMIN — DEXAMETHASONE SODIUM PHOSPHATE 4 MG: 4 INJECTION, SOLUTION INTRAMUSCULAR; INTRAVENOUS at 07:31

## 2020-01-27 RX ADMIN — HYDROCODONE BITARTRATE AND ACETAMINOPHEN 2 TABLET: 5; 325 TABLET ORAL at 23:35

## 2020-01-27 RX ADMIN — FENTANYL CITRATE 25 MCG: 50 INJECTION INTRAMUSCULAR; INTRAVENOUS at 11:20

## 2020-01-27 RX ADMIN — SODIUM CHLORIDE, SODIUM LACTATE, POTASSIUM CHLORIDE, AND CALCIUM CHLORIDE 125 ML/HR: .6; .31; .03; .02 INJECTION, SOLUTION INTRAVENOUS at 23:37

## 2020-01-27 RX ADMIN — FENTANYL CITRATE 50 MCG: 50 INJECTION, SOLUTION INTRAMUSCULAR; INTRAVENOUS at 08:09

## 2020-01-27 RX ADMIN — ENOXAPARIN SODIUM 40 MG: 40 INJECTION SUBCUTANEOUS at 14:20

## 2020-01-27 RX ADMIN — CEFAZOLIN SODIUM 1000 MG: 1 SOLUTION INTRAVENOUS at 16:08

## 2020-01-27 RX ADMIN — GLYCOPYRROLATE 0.4 MG: 0.2 INJECTION INTRAMUSCULAR; INTRAVENOUS at 10:44

## 2020-01-27 RX ADMIN — ROCURONIUM BROMIDE 20 MG: 50 INJECTION, SOLUTION INTRAVENOUS at 08:04

## 2020-01-27 RX ADMIN — GABAPENTIN 100 MG: 100 CAPSULE ORAL at 23:35

## 2020-01-27 RX ADMIN — ONDANSETRON 4 MG: 2 INJECTION INTRAMUSCULAR; INTRAVENOUS at 10:26

## 2020-01-27 RX ADMIN — NEOSTIGMINE METHYLSULFATE 3 MG: 1 INJECTION, SOLUTION INTRAVENOUS at 10:44

## 2020-01-27 RX ADMIN — FENTANYL CITRATE 100 MCG: 50 INJECTION, SOLUTION INTRAMUSCULAR; INTRAVENOUS at 07:31

## 2020-01-27 RX ADMIN — ROCURONIUM BROMIDE 20 MG: 50 INJECTION, SOLUTION INTRAVENOUS at 09:31

## 2020-01-27 RX ADMIN — ROCURONIUM BROMIDE 20 MG: 50 INJECTION, SOLUTION INTRAVENOUS at 08:45

## 2020-01-27 RX ADMIN — ROCURONIUM BROMIDE 10 MG: 50 INJECTION, SOLUTION INTRAVENOUS at 09:19

## 2020-01-27 RX ADMIN — LIDOCAINE HYDROCHLORIDE 100 MG: 20 INJECTION, SOLUTION INTRAVENOUS at 07:31

## 2020-01-27 RX ADMIN — GABAPENTIN 100 MG: 100 CAPSULE ORAL at 13:02

## 2020-01-27 RX ADMIN — DOCUSATE SODIUM 100 MG: 100 CAPSULE, LIQUID FILLED ORAL at 18:28

## 2020-01-27 RX ADMIN — SODIUM CHLORIDE: 0.9 INJECTION, SOLUTION INTRAVENOUS at 07:37

## 2020-01-27 RX ADMIN — ROCURONIUM BROMIDE 50 MG: 50 INJECTION, SOLUTION INTRAVENOUS at 07:31

## 2020-01-27 RX ADMIN — SODIUM CHLORIDE: 0.9 INJECTION, SOLUTION INTRAVENOUS at 10:35

## 2020-01-27 RX ADMIN — FENTANYL CITRATE 25 MCG: 50 INJECTION INTRAMUSCULAR; INTRAVENOUS at 11:32

## 2020-01-27 RX ADMIN — ALLOPURINOL 200 MG: 100 TABLET ORAL at 13:02

## 2020-01-27 RX ADMIN — OXYBUTYNIN 10 MG: 10 TABLET, FILM COATED, EXTENDED RELEASE ORAL at 13:02

## 2020-01-27 RX ADMIN — SODIUM CHLORIDE, SODIUM LACTATE, POTASSIUM CHLORIDE, AND CALCIUM CHLORIDE 125 ML/HR: .6; .31; .03; .02 INJECTION, SOLUTION INTRAVENOUS at 13:03

## 2020-01-27 RX ADMIN — PROPOFOL 200 MG: 10 INJECTION, EMULSION INTRAVENOUS at 07:31

## 2020-01-27 NOTE — OP NOTE
OPERATIVE REPORT  PATIENT NAME: Alicia Cruz    :  1955  MRN: [id number]  Pt Location: [facility] OR ROOM 06    SURGERY DATE: 2020    Surgeon(s) and Role:     * Praveen Coughlin MD - Primary     * Rory Beltran PA-C - Assisting     * Carolina Medeiros PA-C - Assisting    Preop Diagnosis:  Prostate cancer ([facility] [address] ) [place]    Post-Op Diagnosis Codes:     * Prostate cancer ([address] ) [place]    Procedure(s) (LRB):  PROSTATECTOMY RADICAL W ROBOTICS; Bilateral Pelvic Lymph Node Dissection (N/A)    Specimen(s):  ID Type Source Tests Collected by Time Destination   1 : prostate and seminal vesicles Tissue Prostate TISSUE EXAM Praveen Coughlin MD 2020 9220    2 : Periprostatic Fat Lymph Nodes  Tissue Lymph Node TISSUE EXAM Praveen Coughlin MD 2020 9244    3 :  right  pelvic lymph node  Tissue Lymph Node TISSUE EXAM Praveen Coughlin MD 2020 0717    4 : left pelvic lymph node  Tissue Lymph Node TISSUE EXAM Praveen Coughlin MD 2020 9537        Estimated Blood Loss:   75 mL    Drains:  Closed/Suction Drain Left LLQ Bulb 19 Fr  (Active)   Number of days: 0       Urethral Catheter Latex 20 Fr  (Active)   Number of days: 0       [REMOVED] Urethral Catheter Non-latex; Latex 18 Fr  (Removed)   Number of days: 0       Anesthesia Type:   General    Operative Indications:  Prostate cancer ([facility] [address] ) [C61]      Operative Findings:  1  Adhesion of the LEFT seminal vesical to lateral tissue  2  Minimal athermic LEFT nerve sparing  3  Normal rectal exam without concern for rectal injury  4  Water tight anastomosis to 498SQ    Complications:   None    Procedure and Technique:    Alicia Cruz is a 62 y o  y o  male with a history of [place] 3+4=7 prostate cancer identified in RIGHT the prostate  Risk and benefits of da [place] robot-assisted laparoscopic prostatectomy with bilateral pelvic lymph node dissection were discussed and reviewed   Informed consent was obtained and the patient was returned to the operating room on 1/27/2020  After the smooth induction of general endotracheal anesthesia, the patient was placed in a low lithotomy position  Venodyne boots were applied  Pressure points were padded  The patient was secured to the bed with padding, towels, and tape  Intravenous antibiotics were administered  A timeout was performed with all members of the operative team confirming the patient's identity and procedure to be performed  Digital rectal exam was performed and a belladonna and opium suppositories placed per rectum  An 8mm supra-umbilical skin incision was made  The skin was elevated  A Veress needle was utilized to create a pneumoperitoneum  A 8 mm Xi camera port was then placed  The patient was placed into steep Trendelenburg  2 additional working robotic ports were placed at the level of the umbilicus and approximately 4 fingerbreadths lateral to the umbilicus  An additional left lower quadrant robotic port was placed and a right mid quadrant 12 mm assistant port was placed  A 5 mm assistant port was placed in the right upper quadrant  The robot was docked  We identified the bladder neck by manipulating the Bear catheter  A posterior peritoneotomy was created behind the bladder and beneath the prostate until denonvilles fascia was identified  I then identified the vasa deferentia  Bilateral vasa deferentia were dissected proximally and transected  I then used each vas as a handle to provide traction to dissect out the ipsilateral seminal vesicle  Both seminal vesicles were dissected to their tip  Of note, the left seminal vesicle was significantly adhesed laterally to surrounding tissue  I then performed exclusive sharp dissection beneath the prostate creating a plane between the prostate and the rectum  The urachal structures were taken down  The bladder was dropped and the space of Retzius was developed    Fibrofatty tissue was cleaned from the dorsum of the prostate and the endopelvic fascia  A large superficial venous complex was identified on the dorsum of the prostate  This was taken with bipolar electro dissection  I then opened the endopelvic fascia bilaterally from base to apex first on the right and then on the left  I dropped the puboprostatic ligaments  I exposed the dorsal venous complex  Muscular fibers were swept off the dorsal venous complex to expose the notch between the DVC and the urethra  Lateral to the true dorsal venous complex, there were large vascular insertions of the pelvic floor musculature  These were taken with the vessel sealer device to fully expose the dorsal venous complex  The dorsal venous complex was ligated with 0 Vicryl  Next a focused my attention on the prostatic vesical junction  Hot and cold scissor dissection was utilized to create a plane between the bladder and prostate until the Bear catheter was identified in the midline  The balloon was deflated area the catheter was brought into the surgical field and placed on tension  The posterior bladder neck was taken with hot scissor dissection  At this point time, the previously dissected vas deferens and seminal vesicles were elevated anteriorly into the field  Attention was now focused on the LEFT vascular pedicle and performing a minimal athermic LEFT nerve sparing procedure  A small packet of the vascular pedicle were created with the scissor and Weck clips were applied  Once we elevated off small amount of lateral nerve tissue, the vessel sealer device was used to ligate a the left pedicle  I then incised the lateral prostatic fascia from base to apex  The capsule of the prostate was clearly visualized and not violated  I perform this dissection all the way from base to apex  Attention was now focused on the RIGHT vascular pedicle  The RIGHT side was taken with vessel sealer device    I discussed with the patient performing a non-nerve sparing approach on the RIGHT side  Dissection was then performed from base to apex  The last remaining attachments were the dorsal venous complex and the urethra  The dorsal venous complex was taken with hot monopolar hook on the left and cold scissor dissection on the right  I then placed the prostate on traction and identified the urethra  The urethra was taken sharply with scissors  The Bear catheter was pulled back and the posterior urethra was taken with sharp scissors as well  The last remaining attachments of the rectourethralis muscle were taken with sharp scissors  At this point this specimen was completely free within the pelvis and placed into an Endo Catch bag  The patient had a very vascular dorsal venous complex and despite prior ligation there was some additional bleeding  Pneumoperitoneum was turned to 20 mmHg and a 3 0 V lock suture was used in a running fashion to ligate additionally the dorsal venous complex  Pneumoperitoneum was then returned to 15 mmHg  Attention was focused on performing the pelvic lymph node dissection  I first focused on the right side  The external iliac vein was identified and skeletonized  The pelvic lymph node packet adjacent to the pelvic sidewall was elevated off of the sidewall and dissected free from the surrounding tissue  The obturator nerve was identified and preserved  The lymph node packet was completely dissected and removed from the surgical field and sent for permanent specimen  The same procedure was then repeated on the left side  Lymph node packets were ligated with Weck clips to reduce lymphatic leakage  I then performed the anastomosis between the bladder and urethra  I started with 2 V lock sutures placed in an outside in fashion on the bladder neck posteriorly  The anastomosis was performed in an inside out fashion on the urethra and an outside in fashion on the bladder    Once the 2 sutures were placed in either side tension was carefully taken off of the sutures until there was excellent reapproximation of the posterior urethra to the posterior bladder neck  I then completed approximately two thirds of the anastomosis in this fashion  I ultimately locked suture on the bladder side and completed the anastomosis in an outside in fashion on the urethra and in an inside out fashion on the bladder  Prior to completing the anastomosis a new Bear catheter was placed under vision  The anastomosis was completed and the sutures were secured  15 cc were placed into the balloon  The catheter was placed on gentle traction and the bladder was irrigated with over 150 cc of sterile saline with minimal extravasation  A Xu drain was then brought out through the left lower quadrant robotic port under vision  The Endo Catch bag string was brought out through the supra umbilical 8 mm port  The supraumbilical 8 mm port was opened to allow for easy removal of the prostate and seminal vesicles within the Endo Catch bag  The fascia was then reapproximated with 0 Prolene suture  All incisions were irrigated and the skin was closed with 4-0 Monocryl and history  The drain was secured in place with a drain stitch and a drain sponge was placed  The catheter was placed onto gentle traction and secured  Overall the patient tolerated the procedure well and there were no complications  The patient was extubated in the operating room and transferred to the PACU in stable condition at the conclusion of the case  Plan-Admission for observation       I was present for the entire procedure    Patient Disposition:  PACU     SIGNATURE: Priya Sales MD  DATE: January 27, 2020  TIME: 10:42 AM

## 2020-01-27 NOTE — INTERVAL H&P NOTE
H&P reviewed  After examining the patient I find no changes in the patients condition since the H&P had been written      Vitals:    01/27/20 0559   BP: 155/78   Pulse: 81   Resp: 18   Temp: 98 1 °F (36 7 °C)   SpO2: 96%

## 2020-01-27 NOTE — PLAN OF CARE
Problem: Potential for Falls  Goal: Patient will remain free of falls  Description  INTERVENTIONS:  - Assess patient frequently for physical needs  -  Identify cognitive and physical deficits and behaviors that affect risk of falls    -  [place] fall precautions as indicated by assessment   - Educate patient/family on patient safety including physical limitations  - Instruct patient to call for assistance with activity based on assessment  - Modify environment to reduce risk of injury  - Consider OT/PT consult to assist with strengthening/mobility  1/27/2020 1333 by Natalia Cody RN  Outcome: Progressing  1/27/2020 1333 by Natalia Cody RN  Outcome: Progressing     Problem: SKIN/TISSUE INTEGRITY - ADULT  Goal: Incision(s), wounds(s) or drain site(s) healing without S/S of infection  Description  INTERVENTIONS  - Assess and document risk factors for skin impairment   - Assess and document dressing, incision, wound bed, drain sites and surrounding tissue  - Consider nutrition services referral as needed  - Oral mucous membranes remain intact  - Provide patient/ family education  1/27/2020 1333 by Natalia Cody RN  Outcome: Progressing  1/27/2020 1333 by Natalia Cody RN  Outcome: Progressing     Problem: PAIN - ADULT  Goal: Verbalizes/displays adequate comfort level or baseline comfort level  Description  Interventions:  - Encourage patient to monitor pain and request assistance  - Assess pain using appropriate pain scale  - Administer analgesics based on type and severity of pain and evaluate response  - Implement non-pharmacological measures as appropriate and evaluate response  - Consider cultural and social influences on pain and pain management  - Notify physician/advanced practitioner if interventions unsuccessful or patient reports new pain  1/27/2020 1333 by Natalia Cody RN  Outcome: Progressing  1/27/2020 1333 by Natalia Cody RN  Outcome: Progressing     Problem: INFECTION - ADULT  Goal: Absence or prevention of progression during hospitalization  Description  INTERVENTIONS:  - Assess and monitor for signs and symptoms of infection  - Monitor lab/diagnostic results  - Monitor all insertion sites, i e  indwelling lines, tubes, and drains  - Monitor endotracheal if appropriate and nasal secretions for changes in amount and color  - [place] appropriate cooling/warming therapies per order  - Administer medications as ordered  - Instruct and encourage patient and family to use good hand hygiene technique  - Identify and instruct in appropriate isolation precautions for identified infection/condition  1/27/2020 1333 by Grace De La Vega RN  Outcome: Progressing  1/27/2020 1333 by Grace De La Vega RN  Outcome: Progressing  Goal: Absence of fever/infection during neutropenic period  Description  INTERVENTIONS:  - Monitor WBC    1/27/2020 1333 by Grace De La Vega RN  Outcome: Progressing  1/27/2020 1333 by Grace De La Vega RN  Outcome: Progressing     Problem: SAFETY ADULT  Goal: Patient will remain free of falls  Description  INTERVENTIONS:  - Assess patient frequently for physical needs  -  Identify cognitive and physical deficits and behaviors that affect risk of falls    -  [place] fall precautions as indicated by assessment   - Educate patient/family on patient safety including physical limitations  - Instruct patient to call for assistance with activity based on assessment  - Modify environment to reduce risk of injury  - Consider OT/PT consult to assist with strengthening/mobility  1/27/2020 1333 by Grace De La Vega RN  Outcome: Progressing  1/27/2020 1333 by Grace De La Vega RN  Outcome: Progressing  Goal: Maintain or return to baseline ADL function  Description  INTERVENTIONS:  -  Assess patient's ability to carry out ADLs; assess patient's baseline for ADL function and identify physical deficits which impact ability to perform ADLs (bathing, care of mouth/teeth, toileting, grooming, dressing, etc )  - Assess/evaluate cause of self-care deficits   - Assess range of motion  - Assess patient's mobility; develop plan if impaired  - Assess patient's need for assistive devices and provide as appropriate  - Encourage maximum independence but intervene and supervise when necessary  - Involve family in performance of ADLs  - Assess for home care needs following discharge   - Consider OT consult to assist with ADL evaluation and planning for discharge  - Provide patient education as appropriate  1/27/2020 1333 by Lorenzo Giordano RN  Outcome: Progressing  1/27/2020 1333 by Lorenzo Giordano RN  Outcome: Progressing  Goal: Maintain or return mobility status to optimal level  Description  INTERVENTIONS:  - Assess patient's baseline mobility status (ambulation, transfers, stairs, etc )    - Identify cognitive and physical deficits and behaviors that affect mobility  - Identify mobility aids required to assist with transfers and/or ambulation (gait belt, sit-to-stand, lift, walker, cane, etc )  - [place] fall precautions as indicated by assessment  - Record patient progress and toleration of activity level on Mobility SBAR; progress patient to next Phase/Stage  - Instruct patient to call for assistance with activity based on assessment  - Consider rehabilitation consult to assist with strengthening/weightbearing, etc   1/27/2020 1333 by Lorenzo Giordano RN  Outcome: Progressing  1/27/2020 1333 by Lorenzo Giordano RN  Outcome: Progressing     Problem: DISCHARGE PLANNING  Goal: Discharge to home or other facility with appropriate resources  Description  INTERVENTIONS:  - Identify barriers to discharge w/patient and caregiver  - Arrange for needed discharge resources and transportation as appropriate  - Identify discharge learning needs (meds, wound care, etc )  - Arrange for interpretive services to assist at discharge as needed  - Refer to Case Management Department for coordinating discharge planning if the patient needs post-hospital services based on physician/advanced practitioner order or complex needs related to functional status, cognitive ability, or social support system  1/27/2020 1333 by Megan Mota RN  Outcome: Progressing  1/27/2020 1333 by Megan Mota RN  Outcome: Progressing     Problem: Knowledge Deficit  Goal: Patient/family/caregiver demonstrates understanding of disease process, treatment plan, medications, and discharge instructions  Description  Complete learning assessment and assess knowledge base    Interventions:  - Provide teaching at level of understanding  - Provide teaching via preferred learning methods  1/27/2020 1333 by Megan Mota RN  Outcome: Progressing  1/27/2020 1333 by Megan Mota RN  Outcome: Progressing

## 2020-01-27 NOTE — DISCHARGE INSTRUCTIONS
After surgery, you should be active when at home  You will need to take plenty of rest  No heavy lifting (weight limit is approximately a gallon jug of milk held in each hand ) You can shower but do not submerge in water; no tubs/pools/baths  You can walk up and down stairs  Your incision has disolvable sutures and surgical glue  If there is any concern about the incision (redness, drainage, bleeding) please call your surgeon's office  You can eat and drink whatever you like, but monitor for signs of constipation  You should be having daily bowel movements  Take your stool softeners until your bowel begin to function  If you are still constipated, call your surgeon's office to discuss additional medication  Robot Assisted Laparoscopic Prostatectomy   WHAT YOU NEED TO KNOW:   Robot-assisted laparoscopic prostatectomy (RALP) is surgery to remove your prostate gland through small incisions in your abdomen  RALP is done with a machine that is controlled by your surgeon  The machine has mechanical arms that use small tools to remove your prostate  DISCHARGE INSTRUCTIONS:   Medicines:   · Pain medicine: You may be given a prescription medicine to decrease pain  Do not wait until the pain is severe before you take this medicine  · Stool softeners: This medicine makes it easier for you to have a bowel movement  You may need this medicine to treat or prevent constipation  · Antibiotics: This medicine is given to fight or prevent an infection caused by bacteria  Always take your antibiotics exactly as ordered by your healthcare provider  Do not stop taking your medicine unless directed by your healthcare provider  Never save antibiotics or take leftover antibiotics that were given to you for another illness  · Take your medicine as directed  Contact your healthcare provider if you think your medicine is not helping or if you have side effects  Tell him or her if you are allergic to any medicine  Keep a list of the medicines, vitamins, and herbs you take  Include the amounts, and when and why you take them  Bring the list or the pill bottles to follow-up visits  Carry your medicine list with you in case of an emergency  Follow up with your healthcare provider or uro-oncologist in 1 week or as directed: You will need your urinary catheter removed  Tests will show if any cancer remains in your body after surgery  Write down your questions so you remember to ask them during your visits  A Bear catheter  is a tube put into your bladder to drain urine into a bag  Keep the bag below your waist  This will prevent urine from flowing back into your bladder and causing an infection or other problems  Also, keep the tube free of kinks so the urine will drain properly  Do not pull on the catheter  This can cause pain and bleeding, and may cause the catheter to come out  Wound care: Follow your healthcare provider's directions on how to care for your incisions  Ask when you can start showering or bathing  You will need to keep your stitches covered so they do not get wet  What to expect after surgery:   · Activities: You may feel like resting more after surgery  Slowly start to do more each day  Rest when you feel it is needed  ¨ Normal daily activities:  Ask your healthcare provider when it is safe to return to your normal daily activities  You may need to wait 4 to 6 weeks after surgery  Your healthcare provider will tell you about the activities you should avoid after your surgery  These may include driving while you are taking pain medicines or until your catheter is removed  You may also be given a weight restriction on lifting objects  ¨ Walking and other exercise:  Walking is an important activity to help with your recovery after surgery  Walking is a good way to improve your overall health and help you recover sooner  It also helps keep your blood flowing and reduces the risk of blood clots   Other types of exercise can also be an important part of your recovery  Ask about the exercises that are safe for you  ¨ Sexual activity:  Ask when it is safe to start having sexual intercourse again  You may have trouble having an erection  Your erections may return with time  Medicines and mechanical aids may help  Talk to your healthcare provider about these options  · Bladder control:  After surgery, you may have problems controlling when you urinate  Ask your healthcare provider about pads and liners that absorb urine while you recover  · Constipation:  You may have problems having bowel movements during your recovery  Ask your healthcare provider about diet changes if you are having problems with constipation  Contact your healthcare provider or uro-oncologist if:   · You have a fever  · Your pain is getting worse, even with medicine  · You have an incision that is red, swollen, or has pus coming from it  · You are urinating less than usual      · You have trouble urinating or having a bowel movement  · You have questions or concerns about your condition or care  Seek care immediately or call 911 if:   · You have more blood in your urine than you were told to expect, or you pass a blood clot  · You have an upset stomach or are vomiting  · You have painful swelling in your abdomen that does not go away  · You suddenly feel lightheaded and short of breath  · You have chest pain when you take a deep breath or cough  You cough up blood  · Your arm or leg feels warm, tender, and painful  It may look swollen and red  © 2017 [employer] Information is for End User's use only and may not be sold, redistributed or otherwise used for commercial purposes  All illustrations and images included in CareNotes® are the copyrighted property of [employer]  or [employer]  The above information is an  only   It is not intended as medical advice for individual conditions or treatments  Talk to your doctor, nurse or pharmacist before following any medical regimen to see if it is safe and effective for you

## 2020-01-27 NOTE — PLAN OF CARE
Problem: Potential for Falls  Goal: Patient will remain free of falls  Description  INTERVENTIONS:  - Assess patient frequently for physical needs  -  Identify cognitive and physical deficits and behaviors that affect risk of falls    -  [place] fall precautions as indicated by assessment   - Educate patient/family on patient safety including physical limitations  - Instruct patient to call for assistance with activity based on assessment  - Modify environment to reduce risk of injury  - Consider OT/PT consult to assist with strengthening/mobility  Outcome: Progressing     Problem: SKIN/TISSUE INTEGRITY - ADULT  Goal: Incision(s), wounds(s) or drain site(s) healing without S/S of infection  Description  INTERVENTIONS  - Assess and document risk factors for skin impairment   - Assess and document dressing, incision, wound bed, drain sites and surrounding tissue  - Consider nutrition services referral as needed  - Oral mucous membranes remain intact  - Provide patient/ family education  Outcome: Progressing     Problem: PAIN - ADULT  Goal: Verbalizes/displays adequate comfort level or baseline comfort level  Description  Interventions:  - Encourage patient to monitor pain and request assistance  - Assess pain using appropriate pain scale  - Administer analgesics based on type and severity of pain and evaluate response  - Implement non-pharmacological measures as appropriate and evaluate response  - Consider cultural and social influences on pain and pain management  - Notify physician/advanced practitioner if interventions unsuccessful or patient reports new pain  Outcome: Progressing     Problem: INFECTION - ADULT  Goal: Absence or prevention of progression during hospitalization  Description  INTERVENTIONS:  - Assess and monitor for signs and symptoms of infection  - Monitor lab/diagnostic results  - Monitor all insertion sites, i e  indwelling lines, tubes, and drains  - Monitor endotracheal if appropriate and nasal secretions for changes in amount and color  - [place] appropriate cooling/warming therapies per order  - Administer medications as ordered  - Instruct and encourage patient and family to use good hand hygiene technique  - Identify and instruct in appropriate isolation precautions for identified infection/condition  Outcome: Progressing  Goal: Absence of fever/infection during neutropenic period  Description  INTERVENTIONS:  - Monitor WBC    Outcome: Progressing     Problem: SAFETY ADULT  Goal: Patient will remain free of falls  Description  INTERVENTIONS:  - Assess patient frequently for physical needs  -  Identify cognitive and physical deficits and behaviors that affect risk of falls    -  [place] fall precautions as indicated by assessment   - Educate patient/family on patient safety including physical limitations  - Instruct patient to call for assistance with activity based on assessment  - Modify environment to reduce risk of injury  - Consider OT/PT consult to assist with strengthening/mobility  Outcome: Progressing  Goal: Maintain or return to baseline ADL function  Description  INTERVENTIONS:  -  Assess patient's ability to carry out ADLs; assess patient's baseline for ADL function and identify physical deficits which impact ability to perform ADLs (bathing, care of mouth/teeth, toileting, grooming, dressing, etc )  - Assess/evaluate cause of self-care deficits   - Assess range of motion  - Assess patient's mobility; develop plan if impaired  - Assess patient's need for assistive devices and provide as appropriate  - Encourage maximum independence but intervene and supervise when necessary  - Involve family in performance of ADLs  - Assess for home care needs following discharge   - Consider OT consult to assist with ADL evaluation and planning for discharge  - Provide patient education as appropriate  Outcome: Progressing  Goal: Maintain or return mobility status to optimal level  Description  INTERVENTIONS:  - Assess patient's baseline mobility status (ambulation, transfers, stairs, etc )    - Identify cognitive and physical deficits and behaviors that affect mobility  - Identify mobility aids required to assist with transfers and/or ambulation (gait belt, sit-to-stand, lift, walker, cane, etc )  - [place] fall precautions as indicated by assessment  - Record patient progress and toleration of activity level on Mobility SBAR; progress patient to next Phase/Stage  - Instruct patient to call for assistance with activity based on assessment  - Consider rehabilitation consult to assist with strengthening/weightbearing, etc   Outcome: Progressing     Problem: DISCHARGE PLANNING  Goal: Discharge to home or other facility with appropriate resources  Description  INTERVENTIONS:  - Identify barriers to discharge w/patient and caregiver  - Arrange for needed discharge resources and transportation as appropriate  - Identify discharge learning needs (meds, wound care, etc )  - Arrange for interpretive services to assist at discharge as needed  - Refer to Case Management Department for coordinating discharge planning if the patient needs post-hospital services based on physician/advanced practitioner order or complex needs related to functional status, cognitive ability, or social support system  Outcome: Progressing     Problem: Knowledge Deficit  Goal: Patient/family/caregiver demonstrates understanding of disease process, treatment plan, medications, and discharge instructions  Description  Complete learning assessment and assess knowledge base    Interventions:  - Provide teaching at level of understanding  - Provide teaching via preferred learning methods  Outcome: Progressing

## 2020-01-27 NOTE — ANESTHESIA POSTPROCEDURE EVALUATION
Post-Op Assessment Note    CV Status:  Stable    Pain management: adequate     Mental Status:  Alert and awake   Hydration Status:  Euvolemic   PONV Controlled:  Controlled   Airway Patency:  Patent  Airway: intubated   Post Op Vitals Reviewed: Yes      Staff: Anesthesiologist           /65 (01/27/20 1151)    Temp      Pulse 84 (01/27/20 1151)   Resp 13 (01/27/20 1151)    SpO2 96 % (01/27/20 1151)

## 2020-01-28 VITALS
WEIGHT: 198.85 LBS | TEMPERATURE: 97.9 F | BODY MASS INDEX: 29.45 KG/M2 | RESPIRATION RATE: 18 BRPM | OXYGEN SATURATION: 96 % | DIASTOLIC BLOOD PRESSURE: 56 MMHG | SYSTOLIC BLOOD PRESSURE: 120 MMHG | HEART RATE: 64 BPM | HEIGHT: 69 IN

## 2020-01-28 LAB
ANION GAP SERPL CALCULATED.3IONS-SCNC: 6 MMOL/L (ref 4–13)
BUN SERPL-MCNC: 12 MG/DL (ref 5–25)
CALCIUM SERPL-MCNC: 8 MG/DL (ref 8.3–10.1)
CHLORIDE SERPL-SCNC: 105 MMOL/L (ref 100–108)
CO2 SERPL-SCNC: 27 MMOL/L (ref 21–32)
CREAT SERPL-MCNC: 1.08 MG/DL (ref 0.6–1.3)
ERYTHROCYTE [DISTWIDTH] IN BLOOD BY AUTOMATED COUNT: 13.1 % (ref 11.6–15.1)
GFR SERPL CREATININE-BSD FRML MDRD: 73 ML/MIN/1.73SQ M
GLUCOSE SERPL-MCNC: 88 MG/DL (ref 65–140)
HCT VFR BLD AUTO: 38.1 % (ref 36.5–49.3)
HGB BLD-MCNC: 12.9 G/DL (ref 12–17)
MCH RBC QN AUTO: 33.2 PG (ref 26.8–34.3)
MCHC RBC AUTO-ENTMCNC: 33.9 G/DL (ref 31.4–37.4)
MCV RBC AUTO: 98 FL (ref 82–98)
PLATELET # BLD AUTO: 215 THOUSANDS/UL (ref 149–390)
PMV BLD AUTO: 8.9 FL (ref 8.9–12.7)
POTASSIUM SERPL-SCNC: 4.1 MMOL/L (ref 3.5–5.3)
RBC # BLD AUTO: 3.89 MILLION/UL (ref 3.88–5.62)
SODIUM SERPL-SCNC: 138 MMOL/L (ref 136–145)
WBC # BLD AUTO: 12.21 THOUSAND/UL (ref 4.31–10.16)

## 2020-01-28 PROCEDURE — 80048 BASIC METABOLIC PNL TOTAL CA: CPT | Performed by: UROLOGY

## 2020-01-28 PROCEDURE — 99024 POSTOP FOLLOW-UP VISIT: CPT | Performed by: PHYSICIAN ASSISTANT

## 2020-01-28 PROCEDURE — NC001 PR NO CHARGE: Performed by: PHYSICIAN ASSISTANT

## 2020-01-28 PROCEDURE — 85027 COMPLETE CBC AUTOMATED: CPT | Performed by: UROLOGY

## 2020-01-28 RX ADMIN — ALLOPURINOL 200 MG: 100 TABLET ORAL at 05:54

## 2020-01-28 RX ADMIN — DOCUSATE SODIUM 100 MG: 100 CAPSULE, LIQUID FILLED ORAL at 09:55

## 2020-01-28 RX ADMIN — ENOXAPARIN SODIUM 40 MG: 40 INJECTION SUBCUTANEOUS at 09:55

## 2020-01-28 RX ADMIN — OXYBUTYNIN 10 MG: 10 TABLET, FILM COATED, EXTENDED RELEASE ORAL at 09:55

## 2020-01-28 RX ADMIN — GABAPENTIN 100 MG: 100 CAPSULE ORAL at 09:55

## 2020-01-28 RX ADMIN — HYDROCODONE BITARTRATE AND ACETAMINOPHEN 1 TABLET: 5; 325 TABLET ORAL at 05:54

## 2020-01-28 NOTE — NURSING NOTE
Pt educated on incision care instructions and medications instructions  He was escorted out of the facility with a family member  K[place] care and instructions were discussed with patient  Leg bag provided  Patient expresses that he has previous kramer experience  All questions and concerns addressed

## 2020-01-28 NOTE — PROGRESS NOTES
Progress Note - Urology  Tylor Ends 1955, 62 y o  male MRN: [id number]    Unit/Bed#: E5 -01 Encounter: [id number]    * Prostate cancer [facility])  Assessment & Plan  1 Day Post-Op  Procedure(s):  PROSTATECTOMY RADICAL W ROBOTICS; Bilateral Pelvic Lymph Node Dissection  Surgeon(s):  MD Prema Fonseca PA-C Connie Dicker, PA-C  1/27/2020    Doing well  Will removed LEWIS drain  Will attached to leg bag and ambulate  Will advance to regular diet  Will discharge home this afternoon  Bedside rounds performed with Marti Moore RN  Discussed with Dr Lacey Parker  Subjective/Objective     Subjective:   CC: "I feel pretty darn good "  HPI:  Meme Randolph reports he is feeling well today  He is slightly distended  He has intact skin yet  He is tolerating his will quantity of clear liquid drainage from breakfast   He has not yet been out of bed to the chair  His urethral Bear has been draining clear yellow urine throughout the night  No nausea, vomiting, chest pain or shortness of breath  He offers no other complaints at this time  ROS:  Review of Systems   Constitutional: Negative for activity change and appetite change  HENT: Negative for congestion and ear pain  Eyes: Negative for pain  Respiratory: Negative for cough and shortness of breath  Cardiovascular: Negative for chest pain and palpitations  Gastrointestinal: Negative for abdominal distention, abdominal pain, blood in stool, constipation, diarrhea and nausea  Genitourinary: Negative for difficulty urinating and dysuria  Musculoskeletal: Negative for arthralgias and myalgias  Skin: Negative for rash  Allergic/Immunologic: Negative for immunocompromised state  Neurological: Negative for dizziness and headaches  Hematological: Negative for adenopathy  Does not bruise/bleed easily  Psychiatric/Behavioral: Negative for agitation  The patient is not nervous/anxious          Objective:  Vitals: Blood pressure 120/56, pulse 64, temperature 97 9 °F (36 6 °C), temperature source Temporal, resp  rate 18, height 5' 9" (1 753 m), weight 90 2 kg (198 lb 13 7 oz), SpO2 96 %  ,Body mass index is 29 37 kg/m²  Intake/Output Summary (Last 24 hours) at 1/28/2020 1121  Last data filed at 1/28/2020 0900  Gross per 24 hour   Intake 1302 5 ml   Output 3375 ml   Net -2072 5 ml     Invasive Devices     Peripheral Intravenous Line            Peripheral IV 01/27/20 Left Hand 1 day          Drain            Closed/Suction Drain Left LLQ Bulb 19 Fr  1 day    Urethral Catheter Latex 20 Fr  1 day                Physical Exam   Constitutional: He is oriented to person, place, and time  He appears well-developed and well-nourished  He is cooperative  He does not appear ill  No distress  Pleasant well-appearing 61-year-old male, appears stated age  Sitting upright in bed  No acute distress  HENT:   Head: Normocephalic and atraumatic  Moist mucous membranes  Eyes: Conjunctivae and EOM are normal    Neck: Normal range of motion  Neck supple  No tracheal deviation present  Cardiovascular: Normal rate, regular rhythm and normal heart sounds  No murmur heard  Pulmonary/Chest: Effort normal and breath sounds normal  No respiratory distress  He has no wheezes  Good airflow bilaterally on deep inspiration  Abdominal: Soft  Bowel sounds are normal  He exhibits no distension and no mass  There is no tenderness  Incisions clean dry and intact with skin glue in place  Left lower quadrant LEWIS draining serosanguineous drainage  Urethral Bear draining clear yellow urine  Abdomen slightly distended with normoactive bowel sounds x4 quadrants  Mild tympany to percussion  Musculoskeletal: Normal range of motion  He exhibits no edema  Neurological: He is alert and oriented to person, place, and time  Skin: Skin is warm and dry  No rash noted  He is not diaphoretic  No erythema  No pallor     Psychiatric: He has a normal mood and affect  His behavior is normal  Judgment and thought content normal    Nursing note and vitals reviewed  History:    Past Medical History:   Diagnosis Date    Bladder cancer ([facility] [address] )     2018- surgery/ mitomycin    Chronic pain disorder     low back    Degenerative joint disease involving multiple joints     Gout     Herniated lumbar intervertebral disc     Hypertension     Prostate CA ([facility] [address] )     Tinnitus     Wears glasses     Wears partial dentures     uppers     Past Surgical History:   Procedure Laterality Date    CYSTOSCOPY  02/27/2019    ME BIOPSY OF PROSTATE,NEEDLE/PUNCH N/A 10/15/2019    Procedure: Flores Alejandra NEEDLE BIOPSY PROSTATE, CYSTOSCOPY;  Surgeon: Rome Pelayo MD;  [facility];  Service: Urology    ME CYSTOURETHROSCOPY,FULGUR <0 5 CM LESN N/A 11/14/2018    Procedure: TRANSURETHRAL RESECTION OF BLADDER TUMOR (TURBT); Surgeon: Rome Pelayo MD;  [facility];  Service: Urology    ME INSTILL ANTICANCER AGENT IN BLADDER N/A 11/14/2018    Procedure: [place] Labor;  Surgeon: Rome Pelayo MD;  [facility];  Service: Urology    ME LAP,PROSTATECTOMY,RADICAL,W/NERVE [address] ROBOTIC N/A 1/27/2020    Procedure: PROSTATECTOMY RADICAL W ROBOTICS; Bilateral Pelvic Lymph Node Dissection;  Surgeon: Rome Pelayo MD;  [facility];  Service: Urology    TONSILLECTOMY      WISDOM TOOTH EXTRACTION       Family History   Problem Relation Age of Onset    Heart attack Father     Throat cancer Father     Cancer Father         throat cancer    Heart disease Mother      Social History     Socioeconomic History    Marital status:       Spouse name: None    Number of children: None    Years of education: None    Highest education level: None   Occupational History    None   Social Needs    Financial resource strain: None    Food insecurity:     Worry: None     Inability: None    Transportation needs: Medical: None     Non-medical: None   Tobacco Use    Smoking status: Never Smoker    Smokeless tobacco: Never Used   Substance and Sexual Activity    Alcohol use: Yes     Frequency: 2-4 times a month     Drinks per session: 7 to 9     Binge frequency: Weekly     Comment: liquor    Drug use: Never    Sexual activity: None   Lifestyle    Physical activity:     Days per week: None     Minutes per session: None    Stress: None   Relationships    Social connections:     Talks on phone: None     Gets together: None     Attends Yazidism service: None     Active member of club or organization: None     Attends meetings of clubs or organizations: None     Relationship status: None    Intimate partner violence:     Fear of current or ex partner: None     Emotionally abused: None     Physically abused: None     Forced sexual activity: None   Other Topics Concern    None   Social History Narrative    Does not consume caffeine        Labs:  Results from last 7 days   Lab Units 01/28/20  0518   WBC Thousand/uL 12 21*   HEMOGLOBIN g/dL 12 9   PLATELETS Thousands/uL 215     Results from last 7 days   Lab Units 01/28/20  0518   SODIUM mmol/L 138   POTASSIUM mmol/L 4 1   CHLORIDE mmol/L 105   CO2 mmol/L 27   BUN mg/dL 12   CREATININE mg/dL 1 08   EGFR ml/min/1 73sq m 73   CALCIUM mg/dL 8 0*               Dar Daugherty PA-C  Date: 1/28/2020 Time: 11:21 AM

## 2020-01-28 NOTE — UTILIZATION REVIEW
Initial Clinical Review    Elective    IP     surgical procedure    Age/Sex: 62 y o  male     Surgery Date:    1/27/20    Procedure: PROSTATECTOMY RADICAL W ROBOTICS; Bilateral Pelvic Lymph Node Dissection (N/A    Anesthesia:    general    Operative Findings: Adhesion of the LEFT seminal vesical to lateral tissue  2  Minimal athermic LEFT nerve sparing  3  Normal rectal exam without concern for rectal injury  4  Water tight anastomosis to 150cc       POD#1 Progress Note:   1/28:   Cont  Post  Op care  Currently   On  Cl liq  Diet,  OOB as omkar  The current medical regimen is effective;  continue present plan and medications  Pain control      Admission Orders: Date/Time/Statement: Inpatient Admission Orders (From admission, onward)     Ordered        01/27/20 1037  Inpatient Admission  Once                   Orders Placed This Encounter   Procedures    Inpatient Admission     Standing Status:   Standing     Number of Occurrences:   1     Order Specific Question:   Admitting Physician     Answer:   Sameer Corrigan [[id number]]     Order Specific Question:   Level of Care     Answer:   Med Surg [[id number]]     Order Specific Question:   Estimated length of stay     Answer:   More than 2 Midnights     Order Specific Question:   Certification     Answer:   I certify that inpatient services are medically necessary for this patient for a duration of greater than two midnights  See H&P and MD Progress Notes for additional information about the patient's course of treatment       Vital Signs: /56 (BP Location: Right arm)   Pulse 64   Temp 97 9 °F (36 6 °C) (Temporal)   Resp 18   Ht 5' 9" (1 753 m)   Wt 90 2 kg (198 lb 13 7 oz)   SpO2 96%   BMI 29 37 kg/m²      Diet:    Cl liq    Mobility:    As  omkar    DVT Prophylaxis:   SCD'S    Medications/Pain Control:   Scheduled Medications:    Medications:  allopurinol 200 mg Oral Early Morning   docusate sodium 100 mg Oral BID   enoxaparin 40 mg Subcutaneous Daily gabapentin 100 mg Oral TID   oxybutynin 10 mg Oral Daily     Continuous IV Infusions:    lactated ringers 125 mL/hr Intravenous Continuous     PRN Meds:    HYDROcodone-acetaminophen 1 tablet Oral Q4H PRN   HYDROcodone-acetaminophen 2 tablet Oral Q4H PRN   HYDROmorphone 0 5 mg Intravenous Q2H PRN   ondansetron 4 mg Intravenous Q6H PRN   simethicone 80 mg Oral 4x Daily PRN         Network Utilization Review Department  [email or web address]  ATTENTION: Please call with any questions or concerns to [phone] and carefully listen to the prompts so that you are directed to the right person  All voicemails are confidential   Gabriela Estuardo all requests for admission clinical reviews, approved or denied determinations and any other requests to dedicated fax number below belonging to the campus where the patient is receiving treatment   List of dedicated fax numbers for the Facilities:  [address] DENIALS (Administrative/Medical Necessity) [phone]   [address] (Maternity/NICU/Pediatrics) [phone]   Ciarra Sellers [phone]   [facility] Jackson [phone]   [facility] [phone]   [address] [facility]  [phone]   Cherylene Smaller [address] [facility] [phone]   Abby Land [phone]   [address] [facility] [address]  [address] [facility] [address] [facility] [phone]

## 2020-01-28 NOTE — ASSESSMENT & PLAN NOTE
1 Day Post-Op  Procedure(s):  PROSTATECTOMY RADICAL W ROBOTICS; Bilateral Pelvic Lymph Node Dissection  Surgeon(s):  MD Lianna Rucker PA-C Evangelina Sheehan, PA-C  1/27/2020    Doing well  Will removed LEWIS drain  Will attached to leg bag and ambulate  Will advance to regular diet  Will discharge home this afternoon

## 2020-01-28 NOTE — DISCHARGE SUMMARY
Discharge Summary - Lissette Juarez 62 y o  male MRN: [id number]    Unit/Bed#: E5 -01 Encounter: [id number]    Admission Date: 1/27/2020     Discharge Date: 01/28/20    HPI: Lissette Juarez is a 62 y o  male who presented for robotic assisted laparoscopic prostatectomy by Dr Walter Bumpers  Procedure(s):  PROSTATECTOMY RADICAL W ROBOTICS; Bilateral Pelvic Lymph Node Dissection  Surgeon(s):  MD Tanvi Arango PA-C Rolin Ivan [place]  1/27/2020    Hospital Course:  Postoperatively he recovered from anesthesia  He was transferred to medical-surgical floor  He began tolerating regular diet  His LEWIS drain was removed and his Bear catheter was attached to a leg bag  He was discharged home with urethral Bear to gravity drainage with home nursing arranged  Bear catheter will remain for 2 weeks  Discharge Diagnosis: Prostate cancer ([facility] [address] )    Condition at Discharge: good     Discharge Medications:  See after visit summary for reconciled discharge medications provided to patient and family  Patient was discharged home on home medications with the addition of Ditropan, Vicodin, Colace  Discharge instructions/Information to patient and family:   See after visit summary for information provided to patient and family  Provisions for Follow-Up Care:  See after visit summary for information related to follow-up care and any pertinent home health orders  Disposition: Home    Planned Readmission: No    Discharge Statement   I spent 20 minutes discharging the patient  This time was spent on the day of discharge  I had direct contact with the patient on the day of discharge  Additional documentation is required if more than 30 minutes were spent on discharge       Signature:   Blanca Walsh PA-C  Date: 1/28/2020 Time: 11:22 AM

## 2020-01-28 NOTE — PLAN OF CARE
Problem: Potential for Falls  Goal: Patient will remain free of falls  Description  INTERVENTIONS:  - Assess patient frequently for physical needs  -  Identify cognitive and physical deficits and behaviors that affect risk of falls    -  [place] fall precautions as indicated by assessment   - Educate patient/family on patient safety including physical limitations  - Instruct patient to call for assistance with activity based on assessment  - Modify environment to reduce risk of injury  - Consider OT/PT consult to assist with strengthening/mobility  Outcome: Progressing     Problem: SKIN/TISSUE INTEGRITY - ADULT  Goal: Incision(s), wounds(s) or drain site(s) healing without S/S of infection  Description  INTERVENTIONS  - Assess and document risk factors for skin impairment   - Assess and document dressing, incision, wound bed, drain sites and surrounding tissue  - Consider nutrition services referral as needed  - Oral mucous membranes remain intact  - Provide patient/ family education  Outcome: Progressing     Problem: PAIN - ADULT  Goal: Verbalizes/displays adequate comfort level or baseline comfort level  Description  Interventions:  - Encourage patient to monitor pain and request assistance  - Assess pain using appropriate pain scale  - Administer analgesics based on type and severity of pain and evaluate response  - Implement non-pharmacological measures as appropriate and evaluate response  - Consider cultural and social influences on pain and pain management  - Notify physician/advanced practitioner if interventions unsuccessful or patient reports new pain  Outcome: Progressing     Problem: INFECTION - ADULT  Goal: Absence or prevention of progression during hospitalization  Description  INTERVENTIONS:  - Assess and monitor for signs and symptoms of infection  - Monitor lab/diagnostic results  - Monitor all insertion sites, i e  indwelling lines, tubes, and drains  - Monitor endotracheal if appropriate and nasal secretions for changes in amount and color  - [place] appropriate cooling/warming therapies per order  - Administer medications as ordered  - Instruct and encourage patient and family to use good hand hygiene technique  - Identify and instruct in appropriate isolation precautions for identified infection/condition  Outcome: Progressing  Goal: Absence of fever/infection during neutropenic period  Description  INTERVENTIONS:  - Monitor WBC    Outcome: Progressing     Problem: SAFETY ADULT  Goal: Patient will remain free of falls  Description  INTERVENTIONS:  - Assess patient frequently for physical needs  -  Identify cognitive and physical deficits and behaviors that affect risk of falls    -  [place] fall precautions as indicated by assessment   - Educate patient/family on patient safety including physical limitations  - Instruct patient to call for assistance with activity based on assessment  - Modify environment to reduce risk of injury  - Consider OT/PT consult to assist with strengthening/mobility  Outcome: Progressing  Goal: Maintain or return to baseline ADL function  Description  INTERVENTIONS:  -  Assess patient's ability to carry out ADLs; assess patient's baseline for ADL function and identify physical deficits which impact ability to perform ADLs (bathing, care of mouth/teeth, toileting, grooming, dressing, etc )  - Assess/evaluate cause of self-care deficits   - Assess range of motion  - Assess patient's mobility; develop plan if impaired  - Assess patient's need for assistive devices and provide as appropriate  - Encourage maximum independence but intervene and supervise when necessary  - Involve family in performance of ADLs  - Assess for home care needs following discharge   - Consider OT consult to assist with ADL evaluation and planning for discharge  - Provide patient education as appropriate  Outcome: Progressing  Goal: Maintain or return mobility status to optimal level  Description  INTERVENTIONS:  - Assess patient's baseline mobility status (ambulation, transfers, stairs, etc )    - Identify cognitive and physical deficits and behaviors that affect mobility  - Identify mobility aids required to assist with transfers and/or ambulation (gait belt, sit-to-stand, lift, walker, cane, etc )  - [place] fall precautions as indicated by assessment  - Record patient progress and toleration of activity level on Mobility SBAR; progress patient to next Phase/Stage  - Instruct patient to call for assistance with activity based on assessment  - Consider rehabilitation consult to assist with strengthening/weightbearing, etc   Outcome: Progressing     Problem: DISCHARGE PLANNING  Goal: Discharge to home or other facility with appropriate resources  Description  INTERVENTIONS:  - Identify barriers to discharge w/patient and caregiver  - Arrange for needed discharge resources and transportation as appropriate  - Identify discharge learning needs (meds, wound care, etc )  - Arrange for interpretive services to assist at discharge as needed  - Refer to Case Management Department for coordinating discharge planning if the patient needs post-hospital services based on physician/advanced practitioner order or complex needs related to functional status, cognitive ability, or social support system  Outcome: Progressing     Problem: Knowledge Deficit  Goal: Patient/family/caregiver demonstrates understanding of disease process, treatment plan, medications, and discharge instructions  Description  Complete learning assessment and assess knowledge base    Interventions:  - Provide teaching at level of understanding  - Provide teaching via preferred learning methods  Outcome: Progressing

## 2020-01-29 LAB
ABO GROUP BLD BPU: NORMAL
ABO GROUP BLD BPU: NORMAL
BPU ID: NORMAL
BPU ID: NORMAL
CROSSMATCH: NORMAL
CROSSMATCH: NORMAL
UNIT DISPENSE STATUS: NORMAL
UNIT DISPENSE STATUS: NORMAL
UNIT PRODUCT CODE: NORMAL
UNIT PRODUCT CODE: NORMAL
UNIT RH: NORMAL
UNIT RH: NORMAL

## 2020-01-30 ENCOUNTER — TELEPHONE (OUTPATIENT)
Dept: UROLOGY | Facility: MEDICAL CENTER | Age: 65
End: 2020-01-30

## 2020-01-30 NOTE — TELEPHONE ENCOUNTER
FYI  Patient of Dr Lacey Parker seen [facility]  Patient called to question if yhe could use a laxative post op Prostate removal   Back line called  Laxative was approved by nursing staff    Thank you

## 2020-01-30 NOTE — TELEPHONE ENCOUNTER
Post Op Note    Rosa Bateman is a 62 y o  male s/p PROSTATECTOMY RADICAL W ROBOTICS; Bilateral Pelvic Lymph Node Dissection performed 01/27/2020  Rosa Bateman is a patient of Dr Roberto Orlando and is seen at the [place] [address] office  How would you rate your pain on a scale from 1 to 10, 10 being the worst pain ever? 0  Have you had a fever? No  Have your bowel movements been regular? No  Do you have any difficulty urinating? No  If the patient has an incision- do you have any redness around the incision or any drainage from the incision? No  If the patient has a drain- are you having any issues with the drain? No  If the patient has a kramer- are you comfortable caring for your kramer? Yes Is it draining urine? Yes  Do you have any other questions or concerns that I can address at this time? Yes, constipation  Pt advised he could take laxatives  No Enemas

## 2020-02-07 ENCOUNTER — OFFICE VISIT (OUTPATIENT)
Dept: UROLOGY | Facility: CLINIC | Age: 65
End: 2020-02-07

## 2020-02-07 VITALS
BODY MASS INDEX: 29.33 KG/M2 | SYSTOLIC BLOOD PRESSURE: 140 MMHG | DIASTOLIC BLOOD PRESSURE: 70 MMHG | HEART RATE: 68 BPM | WEIGHT: 198 LBS | HEIGHT: 69 IN

## 2020-02-07 DIAGNOSIS — C61 PROSTATE CANCER (HCC): Primary | ICD-10-CM

## 2020-02-07 PROCEDURE — 99024 POSTOP FOLLOW-UP VISIT: CPT | Performed by: UROLOGY

## 2020-02-07 NOTE — PROGRESS NOTES
UROLOGY FOLLOW-UP NOTE     CHIEF COMPLAINT   Kathi Harp is a 62 y o  male with a complaint of   Chief Complaint   Patient presents with    Follow-up    Prostate Cancer    Abnormal prostate exam       History of Present Illness:     62 y o  male who developed gross hematuria and some voiding dysfunction in January of 2017  The patient has had several occurrences over the last almost 2 years  He thought that he was passing stones although he has never had a history of stone disease in the past   Patient finally sought care with a nephrologist who recommended return to our office  Patient was able to obtain a CT urogram prior to his visit  He has never been a cigarette or cigar smoker and does not use smokeless tobacco   There is a cancer history in his family but not renal or bladder  Patient underwent a bladder tumor resection on the 11/14/2018  Pathology returned low-grade papillary urothelial carcinoma  The patient had difficulty urinating over the 24 hours after surgery  Bear catheter was placed in our office for greater than 1 L  Patient was initially on Flomax but had side effects and stopped the medication  Retention resolved and patient improved  Returns in followup for bladder cancer  Patient is doing well without any concerns or complaints  Continues CaP screening with oscillating PSA  Lab Results   Component Value Date    PSA 4 4 (H) 08/26/2019    PSA 3 9 07/08/2019    PSA 4 7 (H) 07/06/2018     Because of the PSA and some fullness felt on the right side of his prostate, the patient agreed to proceed with biopsy  We discussed options for biopsy any underwent a transperineal biopsy in early October  Of note the patient is a  and has not been sexually active for many years  He is unconcerned with sexual function  Patient now status post radical prostatectomy on 1/27/2020      Past Medical History:     Past Medical History:   Diagnosis Date    Bladder cancer ([facility] [address] ) 2018- surgery/ mitomycin    Chronic pain disorder     low back    Degenerative joint disease involving multiple joints     Gout     Herniated lumbar intervertebral disc     Hypertension     Prostate CA ([address] )     Tinnitus     Wears glasses     Wears partial dentures     uppers       PAST SURGICAL HISTORY:     Past Surgical History:   Procedure Laterality Date    CYSTOSCOPY  02/27/2019    MT BIOPSY OF PROSTATE,NEEDLE/PUNCH N/A 10/15/2019    Procedure: Ramos Rice NEEDLE BIOPSY PROSTATE, CYSTOSCOPY;  Surgeon: Emily Mauricio MD;  [facility];  Service: Urology    MT CYSTOURETHROSCOPY,FULGUR <0 5 CM LESN N/A 11/14/2018    Procedure: TRANSURETHRAL RESECTION OF BLADDER TUMOR (TURBT);   Surgeon: Emily Mauricio MD;  [facility];  Service: Urology    MT INSTILL ANTICANCER AGENT IN BLADDER N/A 11/14/2018    Procedure: Claudia Gerard;  Surgeon: Emily Mauricio MD;  [facility];  Service: Urology    MT LAP,PROSTATECTOMY,RADICAL,W/NERVE [address] ROBOTIC N/A 1/27/2020    Procedure: PROSTATECTOMY RADICAL W ROBOTICS; Bilateral Pelvic Lymph Node Dissection;  Surgeon: Emily Mauricio MD;  [facility];  Service: Urology    TONSILLECTOMY      WISDOM TOOTH EXTRACTION         CURRENT MEDICATIONS:     Current Outpatient Medications   Medication Sig Dispense Refill    acetaminophen (TYLENOL) 500 mg tablet Take 1,000 mg by mouth every 6 (six) hours as needed for mild pain       ALLOPURINOL PO Take 200 mg by mouth daily in the early morning       lisinopril (ZESTRIL) 2 5 mg tablet Take 1 tablet (2 5 mg total) by mouth daily in the early morning 90 tablet 3    docusate sodium (COLACE) 100 mg capsule Take 1 capsule (100 mg total) by mouth 2 (two) times a day (Patient not taking: Reported on 2/7/2020) 10 capsule 0    HYDROcodone-acetaminophen (NORCO) 5-325 mg per tablet Take 2 tablets by mouth every 4 (four) hours as needed for pain for up to 20 dosesMax Daily Amount: 12 tablets (Patient not taking: Reported on 2/7/2020) 20 tablet 0    oxybutynin (DITROPAN-XL) 10 MG 24 hr tablet Take 1 tablet (10 mg total) by mouth daily (Patient not taking: Reported on 2/7/2020) 14 tablet 0     No current facility-administered medications for this visit  ALLERGIES:   No Known Allergies    SOCIAL HISTORY:     Social History     Socioeconomic History    Marital status:      Spouse name: None    Number of children: None    Years of education: None    Highest education level: None   Occupational History    None   Social Needs    Financial resource strain: None    Food insecurity:     Worry: None     Inability: None    Transportation needs:     Medical: None     Non-medical: None   Tobacco Use    Smoking status: Never Smoker    Smokeless tobacco: Never Used   Substance and Sexual Activity    Alcohol use: Yes     Frequency: 2-4 times a month     Drinks per session: 7 to 9     Binge frequency: Weekly     Comment: liquor    Drug use: Never    Sexual activity: None   Lifestyle    Physical activity:     Days per week: None     Minutes per session: None    Stress: None   Relationships    Social connections:     Talks on phone: None     Gets together: None     Attends Restorationism service: None     Active member of club or organization: None     Attends meetings of clubs or organizations: None     Relationship status: None    Intimate partner violence:     Fear of current or ex partner: None     Emotionally abused: None     Physically abused: None     Forced sexual activity: None   Other Topics Concern    None   Social History Narrative    Does not consume caffeine        SOCIAL HISTORY:     Family History   Problem Relation Age of Onset    Heart attack Father     Throat cancer Father     Cancer Father         throat cancer    Heart disease Mother        REVIEW OF SYSTEMS:     Review of Systems   Constitutional: Negative  Respiratory: Negative  Cardiovascular: Negative  Gastrointestinal: Negative  Genitourinary: Negative  Musculoskeletal: Negative  Skin: Negative  Psychiatric/Behavioral: Negative  PHYSICAL EXAM:     /70 (BP Location: Left arm, Patient Position: Sitting, Cuff Size: Adult)   Pulse 68   Ht 5' 9" (1 753 m)   Wt 89 8 kg (198 lb)   BMI 29 24 kg/m²     General:  Healthy appearing male in no acute distress  They have a normal affect  There is not appear to be any gross neurologic defects or abnormalities  HEENT:  Normocephalic, atraumatic  Neck is supple without any palpable lymphadenopathy  Cardiovascular:  Patient has normal palpable distal radial pulses  There is no significant peripheral edema  No JVD is noted  Respiratory:  Patient has unlabored respirations  There is no audible wheeze or rhonchi  Abdomen:  Abdomen is with healing surgical scars  Abdomen is soft and nontender  There is no tympany  Inguinal and umbilical hernia are not appreciated  Genitourinary: Catheter in place    Musculoskeletal:  Patient does not have significant CVA tenderness in the  flank with palpation or percussion  They full range of motion in all 4 extremities  Strength in all 4 extremities appears congruent  Patient is able to ambulate without assistance or difficulty  Dermatologic:  Patient has no skin abnormalities or rashes  LABS:     CBC:   Lab Results   Component Value Date    WBC 12 21 (H) 2020    HGB 12 9 2020    HCT 38 1 2020    MCV 98 2020     2020       BMP:   Lab Results   Component Value Date    CALCIUM 8 0 (L) 2020    K 4 1 2020    CO2 27 2020     2020    BUN 12 2020    CREATININE 1 08 2020     Lab Results   Component Value Date    PSA 4 4 (H) 2019    PSA 3 9 2019    PSA 4 7 (H) 2018     IMAGIN/19/19  RENAL ULTRASOUND     INDICATION:   R33 8: Other retention of urine     History of prior bladder cancer surgery     COMPARISON: CT renal protocol 9/12/2018     TECHNIQUE:   Ultrasound of the retroperitoneum was performed with a curvilinear transducer utilizing volumetric sweeps and still imaging techniques       FINDINGS:     KIDNEYS:  Symmetric and normal size  Right kidney:  11 1 x 5 1 cm  Left kidney:  10 8 x 6 8 cm      Right kidney  Normal echogenicity and contour  No suspicious masses detected  No hydronephrosis  No shadowing calculi  No perinephric fluid collections      Left kidney  Normal echogenicity and contour  No suspicious masses detected  No hydronephrosis  No shadowing calculi  No perinephric fluid collections      URETERS:  Nonvisualized      BLADDER:   Normally distended  No focal thickening or mass lesions  Bilateral ureteral jets detected         IMPRESSION:     Normal       9/12/18  CT ABDOMEN AND PELVIS WITH AND WITHOUT IV CONTRAST     INDICATION:   R31 0: Gross hematuria      COMPARISON:  CT scan 7/10/2018      TECHNIQUE: Initial CT of the abdomen and pelvis was performed without intravenous contrast   Subsequent dynamic CT evaluation of the abdomen and pelvis was performed after the administration of intravenous contrast in both nephrographic and delayed   phases after the administration of intravenous contrast   Axial, sagittal, and coronal 2D reformatted images were created from the source data and submitted for interpretation       Radiation dose length product (DLP) for this visit:  1852 mGy-cm   This examination, like all CT scans performed in the [facility], was performed utilizing techniques to minimize radiation dose exposure, including the use of iterative   reconstruction and automated exposure control      IV Contrast:  100 mL of iohexol (OMNIPAQUE)  Enteric Contrast:  Enteric contrast was not administered      FINDINGS:     ABDOMEN     RIGHT KIDNEY AND URETER:  No solid renal mass  No detectable urothelial mass    No hydronephrosis or hydroureter  No urinary tract calculi  No perinephric collection      LEFT KIDNEY AND URETER:  No solid renal mass  No detectable urothelial mass  No hydronephrosis or hydroureter  No urinary tract calculi  No perinephric collection      URINARY BLADDER:  There is a 3 3 cm soft tissue mass in the left posterior lateral aspect of the bladder, adjacent to the ureteral orifice  No calculi         LOWER CHEST:  No clinically significant abnormality identified in the visualized lower chest      LIVER/BILIARY TREE:  Unremarkable      GALLBLADDER:  No calcified gallstones  No pericholecystic inflammatory change      SPLEEN:  Unremarkable      PANCREAS:  Unremarkable      ADRENAL GLANDS:  Unremarkable      STOMACH AND BOWEL:  There is colonic diverticulosis without evidence of acute diverticulitis      ABDOMINOPELVIC CAVITY:  No ascites  No free intraperitoneal air  No lymphadenopathy      VESSELS:  Unremarkable for patient's age      PELVIS     REPRODUCTIVE ORGANS:  Unremarkable for patient's age      APPENDIX: No findings to suggest appendicitis      ABDOMINAL WALL/INGUINAL REGIONS:  There is a small fat-containing umbilical hernia      OSSEOUS STRUCTURES:  No acute fracture or destructive osseous lesion      IMPRESSION:     3 3 cm mass in the left posterior lateral aspect of the urinary bladder  No evidence of metastatic disease      Colonic diverticulosis  PATHOLOGY:     1/27/20  Final Diagnosis   A  Prostate gland:  - Adenocarcinoma, acinar type  - Megan score 3+4, grade group 2  - Per cent pattern 4 is approximately 15%  - No tumor seen at margins of resection  - Tumor appears confined to the right half of the prostate and comprises approximately 20% of the prostatic tissue  - Perineural invasion is identified  - See synoptic report for further details     B  Radha prostatic fat:    - No lymph nodes are identified and no malignancy is seen     C   Right pelvic lymph nodes:  - Five lymph nodes are identified showing no metastatic tumor     D  Left pelvic lymph nodes:  - Fatty tissue is seen with no lymph node and no malignancy identified     Note: Block A19 can be used if ancillary testing is requested  10/15/19  Final Diagnosis   A  Prostate, right posterior medial, needle biopsy:  -  Prostatic adenocarcinoma, acinar, not otherwise specified; Megan grade 3 +4= score of 7 (grade group 2) in 2 of 2 cores involving 60% of needle core tissue and measuring up to 7 mm in contiguous length (score 4=10%)  -  Periprostatic fat invasion: Not identified   -  Seminal vesicle invasion: Not identified  -  Lymph-vascular invasion: Not identified   -  Perineural invasion: Not identified   -  Additional Pathologic Findings:  None      B  Positive, right posterior lateral, needle biopsy:  -  Prostatic adenocarcinoma, acinar, not otherwise specified; [place] grade 3 +4= score of 7 (grade group 2) in 2 of 2 cores involving 90% of needle core tissue and measuring up to 9 mm in contiguous length (score 4=20-30%)  -  Periprostatic fat invasion: Not identified   -  Seminal vesicle invasion: Not identified  -  Lymph-vascular invasion: Not identified   -  Perineural invasion: Present   -  Additional Pathologic Findings:  None      C  Prostate, right anterior lateral, needle biopsy:  -  Prostatic adenocarcinoma, acinar, not otherwise specified; Megan grade 3 +4= score of 7 (grade group 2) in 1 of 2 cores involving 40-50% of needle core tissue and measuring up to 9 mm in contiguous length (score 4=10-20%)  -  Periprostatic fat invasion: Not identified   -  Seminal vesicle invasion: Not identified  -  Lymph-vascular invasion: Not identified   -  Perineural invasion: Not identified   -  Additional Pathologic Findings:  None      D  Prostate, right anterior medial, needle biopsy:  -  Benign prostate tissue, negative for malignancy      E    Prostate, right base, needle biopsy:  -  Focal atypical small acinar proliferation, favor prostatic adenocarcinoma, focus shows crush artifact and is  too small to be further differentiated and appropriately graded      F  Prostate, left posterior medial, needle biopsy:  -  Benign prostate tissue, negative for malignancy      G  Prostate, left posterior lateral, needle biopsy:  -  Benign prostate tissue, negative for malignancy      H  Prostate, left anterior lateral, needle biopsy:  -  Benign prostate tissue, negative for malignancy      I  Prostate, left anterior medial, needle biopsy:  -  Benign prostate tissue, negative for malignancy      J   Prostate, left base, needle biopsy:  -  Benign prostate tissue, negative for malignancy  11/14/18  Final Diagnosis   A  Urinary bladder, excision:             - Noninvasive low-grade papillary urothelial carcinoma  - Uninvolved muscularis propria is identified                 Interpretation performed at [address] [address]        NOMOGRAM:         ASSESSMENT:     62 y o  male with LGTa Bladder Cancer, newly diagnosed cT1c Megan 3+4=7 prostate cancer now s/p RALP/PLND on 1/27/20 for [id number]    PLAN:     1  h/o LG Ta UC bladder, diagnosis 11/2018 -    Patient is next cystoscopy will be due 6 months from his recent operative cystoscopy in May 2020    2  Prostate cancer -    Prostatectomy went well  Catheter will be removed today  Patient will be due for PSA every 3 months for the 1st year    Next in May around the time of his cystoscopy will continue Kegel exercises and monitor for  Improvement incontinence and erections discussed

## 2020-04-17 ENCOUNTER — OFFICE VISIT (OUTPATIENT)
Dept: NEPHROLOGY | Facility: CLINIC | Age: 65
End: 2020-04-17
Payer: COMMERCIAL

## 2020-04-17 VITALS
WEIGHT: 207 LBS | HEART RATE: 78 BPM | OXYGEN SATURATION: 96 % | HEIGHT: 69 IN | SYSTOLIC BLOOD PRESSURE: 142 MMHG | BODY MASS INDEX: 30.66 KG/M2 | DIASTOLIC BLOOD PRESSURE: 84 MMHG

## 2020-04-17 DIAGNOSIS — E78.00 PURE HYPERCHOLESTEROLEMIA: ICD-10-CM

## 2020-04-17 DIAGNOSIS — C67.9 MALIGNANT NEOPLASM OF URINARY BLADDER, UNSPECIFIED SITE (HCC): ICD-10-CM

## 2020-04-17 DIAGNOSIS — I10 ESSENTIAL HYPERTENSION: ICD-10-CM

## 2020-04-17 DIAGNOSIS — E79.0 HYPERURICEMIA: ICD-10-CM

## 2020-04-17 DIAGNOSIS — C61 PROSTATE CANCER (HCC): Primary | ICD-10-CM

## 2020-04-17 PROCEDURE — 99214 OFFICE O/P EST MOD 30 MIN: CPT | Performed by: INTERNAL MEDICINE

## 2020-04-17 RX ORDER — ALLOPURINOL 100 MG/1
200 TABLET ORAL
Qty: 180 TABLET | Refills: 3 | Status: SHIPPED | OUTPATIENT
Start: 2020-04-17 | End: 2021-03-01

## 2020-05-07 ENCOUNTER — APPOINTMENT (OUTPATIENT)
Dept: LAB | Facility: HOSPITAL | Age: 65
End: 2020-05-07
Attending: UROLOGY
Payer: COMMERCIAL

## 2020-05-07 DIAGNOSIS — C61 PROSTATE CANCER (HCC): ICD-10-CM

## 2020-05-07 LAB — PSA SERPL-MCNC: <0.1 NG/ML (ref 0–4)

## 2020-05-07 PROCEDURE — 84153 ASSAY OF PSA TOTAL: CPT

## 2020-05-07 PROCEDURE — 36415 COLL VENOUS BLD VENIPUNCTURE: CPT

## 2020-05-22 ENCOUNTER — PROCEDURE VISIT (OUTPATIENT)
Dept: UROLOGY | Facility: CLINIC | Age: 65
End: 2020-05-22
Payer: COMMERCIAL

## 2020-05-22 VITALS
HEART RATE: 74 BPM | TEMPERATURE: 98.6 F | DIASTOLIC BLOOD PRESSURE: 82 MMHG | WEIGHT: 208 LBS | SYSTOLIC BLOOD PRESSURE: 130 MMHG | BODY MASS INDEX: 30.72 KG/M2

## 2020-05-22 DIAGNOSIS — C61 PROSTATE CANCER (HCC): Primary | ICD-10-CM

## 2020-05-22 DIAGNOSIS — C67.0 MALIGNANT NEOPLASM OF TRIGONE OF URINARY BLADDER (HCC): ICD-10-CM

## 2020-05-22 LAB
SL AMB  POCT GLUCOSE, UA: NORMAL
SL AMB LEUKOCYTE ESTERASE,UA: NORMAL
SL AMB POCT BILIRUBIN,UA: NORMAL
SL AMB POCT BLOOD,UA: NORMAL
SL AMB POCT CLARITY,UA: CLEAR
SL AMB POCT COLOR,UA: YELLOW
SL AMB POCT KETONES,UA: NORMAL
SL AMB POCT NITRITE,UA: NORMAL
SL AMB POCT PH,UA: 5
SL AMB POCT SPECIFIC GRAVITY,UA: 1
SL AMB POCT URINE PROTEIN: NORMAL
SL AMB POCT UROBILINOGEN: 2

## 2020-05-22 PROCEDURE — 52000 CYSTOURETHROSCOPY: CPT | Performed by: UROLOGY

## 2020-05-22 PROCEDURE — 99213 OFFICE O/P EST LOW 20 MIN: CPT | Performed by: UROLOGY

## 2020-05-22 PROCEDURE — 81002 URINALYSIS NONAUTO W/O SCOPE: CPT | Performed by: UROLOGY

## 2020-08-24 ENCOUNTER — APPOINTMENT (OUTPATIENT)
Dept: LAB | Facility: HOSPITAL | Age: 65
End: 2020-08-24
Attending: UROLOGY
Payer: COMMERCIAL

## 2020-08-24 DIAGNOSIS — C61 PROSTATE CANCER (HCC): ICD-10-CM

## 2020-08-24 LAB — PSA SERPL-MCNC: <0.1 NG/ML (ref 0–4)

## 2020-08-24 PROCEDURE — 36415 COLL VENOUS BLD VENIPUNCTURE: CPT

## 2020-08-24 PROCEDURE — 84153 ASSAY OF PSA TOTAL: CPT

## 2020-08-27 DIAGNOSIS — I10 ESSENTIAL HYPERTENSION: ICD-10-CM

## 2020-08-27 RX ORDER — LISINOPRIL 2.5 MG/1
TABLET ORAL
Qty: 90 TABLET | Refills: 3 | Status: SHIPPED | OUTPATIENT
Start: 2020-08-27 | End: 2021-07-15

## 2020-09-28 ENCOUNTER — APPOINTMENT (OUTPATIENT)
Dept: LAB | Facility: HOSPITAL | Age: 65
End: 2020-09-28
Attending: INTERNAL MEDICINE
Payer: COMMERCIAL

## 2020-09-28 DIAGNOSIS — I10 ESSENTIAL HYPERTENSION: ICD-10-CM

## 2020-09-28 DIAGNOSIS — E78.00 PURE HYPERCHOLESTEROLEMIA: ICD-10-CM

## 2020-09-28 DIAGNOSIS — E79.0 HYPERURICEMIA: ICD-10-CM

## 2020-09-28 LAB
ALBUMIN SERPL BCP-MCNC: 3.9 G/DL (ref 3.5–5)
ALP SERPL-CCNC: 54 U/L (ref 46–116)
ALT SERPL W P-5'-P-CCNC: 37 U/L (ref 12–78)
ANION GAP SERPL CALCULATED.3IONS-SCNC: 8 MMOL/L (ref 4–13)
AST SERPL W P-5'-P-CCNC: 20 U/L (ref 5–45)
BASOPHILS # BLD AUTO: 0.1 THOUSANDS/ΜL (ref 0–0.1)
BASOPHILS NFR BLD AUTO: 1 % (ref 0–1)
BILIRUB SERPL-MCNC: 0.6 MG/DL (ref 0.2–1)
BUN SERPL-MCNC: 11 MG/DL (ref 5–25)
CALCIUM SERPL-MCNC: 8.7 MG/DL (ref 8.3–10.1)
CHLORIDE SERPL-SCNC: 103 MMOL/L (ref 100–108)
CHOLEST SERPL-MCNC: 134 MG/DL (ref 50–200)
CO2 SERPL-SCNC: 28 MMOL/L (ref 21–32)
CREAT SERPL-MCNC: 1.16 MG/DL (ref 0.6–1.3)
CREAT UR-MCNC: 37.3 MG/DL
CREAT UR-MCNC: 37.3 MG/DL
EOSINOPHIL # BLD AUTO: 0.2 THOUSAND/ΜL (ref 0–0.61)
EOSINOPHIL NFR BLD AUTO: 2 % (ref 0–6)
ERYTHROCYTE [DISTWIDTH] IN BLOOD BY AUTOMATED COUNT: 12.6 % (ref 11.6–15.1)
GFR SERPL CREATININE-BSD FRML MDRD: 66 ML/MIN/1.73SQ M
GLUCOSE P FAST SERPL-MCNC: 88 MG/DL (ref 65–99)
HCT VFR BLD AUTO: 45.3 % (ref 36.5–49.3)
HDLC SERPL-MCNC: 40 MG/DL
HGB BLD-MCNC: 15.6 G/DL (ref 12–17)
IMM GRANULOCYTES # BLD AUTO: 0.04 THOUSAND/UL (ref 0–0.2)
IMM GRANULOCYTES NFR BLD AUTO: 1 % (ref 0–2)
LDLC SERPL CALC-MCNC: 73 MG/DL (ref 0–100)
LYMPHOCYTES # BLD AUTO: 3.3 THOUSANDS/ΜL (ref 0.6–4.47)
LYMPHOCYTES NFR BLD AUTO: 39 % (ref 14–44)
MCH RBC QN AUTO: 32.9 PG (ref 26.8–34.3)
MCHC RBC AUTO-ENTMCNC: 34.4 G/DL (ref 31.4–37.4)
MCV RBC AUTO: 96 FL (ref 82–98)
MICROALBUMIN UR-MCNC: <5 MG/L (ref 0–20)
MICROALBUMIN/CREAT 24H UR: <13 MG/G CREATININE (ref 0–30)
MONOCYTES # BLD AUTO: 0.96 THOUSAND/ΜL (ref 0.17–1.22)
MONOCYTES NFR BLD AUTO: 11 % (ref 4–12)
NEUTROPHILS # BLD AUTO: 3.98 THOUSANDS/ΜL (ref 1.85–7.62)
NEUTS SEG NFR BLD AUTO: 46 % (ref 43–75)
NONHDLC SERPL-MCNC: 94 MG/DL
NRBC BLD AUTO-RTO: 0 /100 WBCS
PLATELET # BLD AUTO: 271 THOUSANDS/UL (ref 149–390)
PMV BLD AUTO: 9.2 FL (ref 8.9–12.7)
POTASSIUM SERPL-SCNC: 3.9 MMOL/L (ref 3.5–5.3)
PROT SERPL-MCNC: 7.6 G/DL (ref 6.4–8.2)
PROT UR-MCNC: <6 MG/DL
PROT/CREAT UR: <0.16 MG/G{CREAT} (ref 0–0.1)
RBC # BLD AUTO: 4.74 MILLION/UL (ref 3.88–5.62)
SODIUM SERPL-SCNC: 139 MMOL/L (ref 136–145)
TRIGL SERPL-MCNC: 106 MG/DL
URATE SERPL-MCNC: 5.2 MG/DL (ref 4.2–8)
WBC # BLD AUTO: 8.58 THOUSAND/UL (ref 4.31–10.16)

## 2020-09-28 PROCEDURE — 82043 UR ALBUMIN QUANTITATIVE: CPT

## 2020-09-28 PROCEDURE — 36415 COLL VENOUS BLD VENIPUNCTURE: CPT

## 2020-09-28 PROCEDURE — 82570 ASSAY OF URINE CREATININE: CPT

## 2020-09-28 PROCEDURE — 85025 COMPLETE CBC W/AUTO DIFF WBC: CPT

## 2020-09-28 PROCEDURE — 80061 LIPID PANEL: CPT

## 2020-09-28 PROCEDURE — 84550 ASSAY OF BLOOD/URIC ACID: CPT

## 2020-09-28 PROCEDURE — 84156 ASSAY OF PROTEIN URINE: CPT

## 2020-09-28 PROCEDURE — 80053 COMPREHEN METABOLIC PANEL: CPT

## 2020-10-14 ENCOUNTER — OFFICE VISIT (OUTPATIENT)
Dept: NEPHROLOGY | Facility: CLINIC | Age: 65
End: 2020-10-14
Payer: COMMERCIAL

## 2020-10-14 VITALS
DIASTOLIC BLOOD PRESSURE: 88 MMHG | BODY MASS INDEX: 31.1 KG/M2 | HEIGHT: 69 IN | WEIGHT: 210 LBS | TEMPERATURE: 96.8 F | HEART RATE: 62 BPM | SYSTOLIC BLOOD PRESSURE: 142 MMHG | OXYGEN SATURATION: 99 %

## 2020-10-14 DIAGNOSIS — N18.2 STAGE 2 CHRONIC KIDNEY DISEASE: ICD-10-CM

## 2020-10-14 DIAGNOSIS — R80.1 PERSISTENT PROTEINURIA: ICD-10-CM

## 2020-10-14 DIAGNOSIS — Z12.11 ENCOUNTER FOR SCREENING COLONOSCOPY: Primary | ICD-10-CM

## 2020-10-14 DIAGNOSIS — E78.00 PURE HYPERCHOLESTEROLEMIA: ICD-10-CM

## 2020-10-14 DIAGNOSIS — C61 PROSTATE CANCER (HCC): ICD-10-CM

## 2020-10-14 DIAGNOSIS — Z23 ENCOUNTER FOR ADMINISTRATION OF VACCINE: ICD-10-CM

## 2020-10-14 DIAGNOSIS — I10 ESSENTIAL HYPERTENSION: ICD-10-CM

## 2020-10-14 PROCEDURE — G0008 ADMIN INFLUENZA VIRUS VAC: HCPCS

## 2020-10-14 PROCEDURE — 90682 RIV4 VACC RECOMBINANT DNA IM: CPT

## 2020-10-14 PROCEDURE — 99214 OFFICE O/P EST MOD 30 MIN: CPT | Performed by: INTERNAL MEDICINE

## 2020-11-02 ENCOUNTER — PROCEDURE VISIT (OUTPATIENT)
Dept: UROLOGY | Facility: CLINIC | Age: 65
End: 2020-11-02
Payer: COMMERCIAL

## 2020-11-02 VITALS
BODY MASS INDEX: 31.25 KG/M2 | HEIGHT: 69 IN | DIASTOLIC BLOOD PRESSURE: 80 MMHG | WEIGHT: 211 LBS | SYSTOLIC BLOOD PRESSURE: 140 MMHG | TEMPERATURE: 97.5 F

## 2020-11-02 DIAGNOSIS — C67.0 MALIGNANT NEOPLASM OF TRIGONE OF URINARY BLADDER (HCC): ICD-10-CM

## 2020-11-02 DIAGNOSIS — C61 PROSTATE CANCER (HCC): Primary | ICD-10-CM

## 2020-11-02 PROCEDURE — 52000 CYSTOURETHROSCOPY: CPT | Performed by: UROLOGY

## 2020-11-02 PROCEDURE — 99213 OFFICE O/P EST LOW 20 MIN: CPT | Performed by: UROLOGY

## 2020-11-02 PROCEDURE — 81002 URINALYSIS NONAUTO W/O SCOPE: CPT | Performed by: UROLOGY

## 2020-11-03 ENCOUNTER — APPOINTMENT (OUTPATIENT)
Dept: LAB | Facility: HOSPITAL | Age: 65
End: 2020-11-03
Attending: UROLOGY
Payer: COMMERCIAL

## 2020-11-03 DIAGNOSIS — C61 PROSTATE CANCER (HCC): ICD-10-CM

## 2020-11-03 LAB — PSA SERPL-MCNC: <0.1 NG/ML (ref 0–4)

## 2020-11-03 PROCEDURE — 36415 COLL VENOUS BLD VENIPUNCTURE: CPT

## 2020-11-03 PROCEDURE — 84153 ASSAY OF PSA TOTAL: CPT

## 2021-02-04 ENCOUNTER — LAB (OUTPATIENT)
Dept: LAB | Facility: HOSPITAL | Age: 66
End: 2021-02-04
Attending: UROLOGY
Payer: COMMERCIAL

## 2021-02-04 DIAGNOSIS — C61 PROSTATE CANCER (HCC): ICD-10-CM

## 2021-02-04 LAB — PSA SERPL-MCNC: <0.1 NG/ML (ref 0–4)

## 2021-02-04 PROCEDURE — 36415 COLL VENOUS BLD VENIPUNCTURE: CPT

## 2021-02-04 PROCEDURE — 84153 ASSAY OF PSA TOTAL: CPT

## 2021-03-01 DIAGNOSIS — E79.0 HYPERURICEMIA: ICD-10-CM

## 2021-03-01 RX ORDER — ALLOPURINOL 100 MG/1
TABLET ORAL
Qty: 180 TABLET | Refills: 3 | Status: SHIPPED | OUTPATIENT
Start: 2021-03-01 | End: 2022-02-21

## 2021-03-10 DIAGNOSIS — Z23 ENCOUNTER FOR IMMUNIZATION: ICD-10-CM

## 2021-04-14 ENCOUNTER — OFFICE VISIT (OUTPATIENT)
Dept: NEPHROLOGY | Facility: CLINIC | Age: 66
End: 2021-04-14
Payer: COMMERCIAL

## 2021-04-14 VITALS
HEART RATE: 65 BPM | WEIGHT: 208 LBS | BODY MASS INDEX: 30.81 KG/M2 | HEIGHT: 69 IN | OXYGEN SATURATION: 96 % | SYSTOLIC BLOOD PRESSURE: 144 MMHG | DIASTOLIC BLOOD PRESSURE: 82 MMHG

## 2021-04-14 DIAGNOSIS — E79.0 HYPERURICEMIA: ICD-10-CM

## 2021-04-14 DIAGNOSIS — C61 PROSTATE CANCER (HCC): ICD-10-CM

## 2021-04-14 DIAGNOSIS — E78.5 HYPERLIPIDEMIA, UNSPECIFIED HYPERLIPIDEMIA TYPE: ICD-10-CM

## 2021-04-14 DIAGNOSIS — E78.00 PURE HYPERCHOLESTEROLEMIA: ICD-10-CM

## 2021-04-14 DIAGNOSIS — N18.2 STAGE 2 CHRONIC KIDNEY DISEASE: ICD-10-CM

## 2021-04-14 DIAGNOSIS — G89.29 ACUTE EXACERBATION OF CHRONIC LOW BACK PAIN: ICD-10-CM

## 2021-04-14 DIAGNOSIS — E55.9 VITAMIN D DEFICIENCY: ICD-10-CM

## 2021-04-14 DIAGNOSIS — M54.50 ACUTE EXACERBATION OF CHRONIC LOW BACK PAIN: ICD-10-CM

## 2021-04-14 DIAGNOSIS — I10 ESSENTIAL HYPERTENSION: Primary | ICD-10-CM

## 2021-04-14 DIAGNOSIS — C67.9 MALIGNANT NEOPLASM OF URINARY BLADDER, UNSPECIFIED SITE (HCC): ICD-10-CM

## 2021-04-14 DIAGNOSIS — R80.1 PERSISTENT PROTEINURIA: ICD-10-CM

## 2021-04-14 PROCEDURE — 99215 OFFICE O/P EST HI 40 MIN: CPT | Performed by: INTERNAL MEDICINE

## 2021-04-14 RX ORDER — MELOXICAM 15 MG/1
15 TABLET ORAL ONCE
Status: DISCONTINUED | OUTPATIENT
Start: 2021-04-14 | End: 2021-04-14

## 2021-04-14 RX ORDER — METHOCARBAMOL 750 MG/1
750 TABLET, FILM COATED ORAL 2 TIMES DAILY PRN
Qty: 30 TABLET | Refills: 3 | Status: SHIPPED | OUTPATIENT
Start: 2021-04-14

## 2021-04-14 NOTE — ASSESSMENT & PLAN NOTE
Lab Results   Component Value Date    EGFR 66 09/28/2020    EGFR 73 01/28/2020    EGFR 65 01/14/2020    CREATININE 1 16 09/28/2020    CREATININE 1 08 01/28/2020    CREATININE 1 18 01/14/2020   Check labs

## 2021-04-14 NOTE — PATIENT INSTRUCTIONS
Robaxin twice a day if needed for spasm (methocarbamol)  Take Mobic (meloxicam) daily with food for 2 weeks to decrease inflammation then stop

## 2021-04-14 NOTE — PROGRESS NOTES
[address] [facility] [place], [place]    Name: Lauren Raya  YOB: 1955      Assessment/Plan:           Problem List Items Addressed This Visit        Cardiovascular and Mediastinum    Essential hypertension - Primary     Blood pressure is controlled at home          Relevant Medications    meloxicam (MOBIC) tablet 15 mg (Start on 4/14/2021  9:00 AM)    methocarbamol (ROBAXIN) 750 mg tablet    Other Relevant Orders    CBC and differential    Comprehensive metabolic panel    Lipid panel    Vitamin D 25 hydroxy    Microalbumin / creatinine urine ratio    Protein / creatinine ratio, urine    Uric acid    Urinalysis with microscopic       Genitourinary    Malignant neoplasm of urinary bladder [facility])     He sees Urology yearly now         Prostate cancer [facility])     Has q 6 month PSA         Stage 2 chronic kidney disease     Lab Results   Component Value Date    EGFR 66 09/28/2020    EGFR 73 01/28/2020    EGFR 65 01/14/2020    CREATININE 1 16 09/28/2020    CREATININE 1 08 01/28/2020    CREATININE 1 18 01/14/2020   Check labs         Relevant Medications    meloxicam (MOBIC) tablet 15 mg (Start on 4/14/2021  9:00 AM)    methocarbamol (ROBAXIN) 750 mg tablet    Other Relevant Orders    CBC and differential    Comprehensive metabolic panel    Lipid panel    Vitamin D 25 hydroxy    Microalbumin / creatinine urine ratio    Protein / creatinine ratio, urine    Uric acid    Urinalysis with microscopic       Other    Hyperuricemia     Check a level         Pure hypercholesterolemia    Persistent proteinuria     Check urine studies         Acute exacerbation of chronic low back pain     And thoracic back pain  Start Robaxin 750 bid and mobic 15mg daily for 2 weeks           Other Visit Diagnoses     Hyperlipidemia, unspecified hyperlipidemia type        Relevant Orders    Lipid panel    Ambulatory referral to Internal Medicine    Vitamin D deficiency        Relevant Orders    Vitamin D 25 hydroxy    Ambulatory referral to Internal Medicine            Subjective:      Patient ID: Anne Quiroz is a 73 y o  male  HPI  Has a history of hypertension, bladder cancer and prostate cancer  He has been feeling well and has no problems with his medications  He has been checking his blood pressure at home and it is 10 mm lower at home  He received the first [place] vaccine for Covid 19    The following portions of the patient's history were reviewed and updated as appropriate: allergies, current medications, past family history, past medical history, past social history, past surgical history and problem list     Review of Systems   Constitutional: Negative for activity change, appetite change, chills and fatigue  HENT: Negative for hearing loss  Eyes: Negative for visual disturbance  Respiratory: Positive for chest tightness  Due to muscle spasm due to heavy lifting   Cardiovascular: Negative for chest pain, palpitations and leg swelling  Gastrointestinal: Negative for abdominal pain, blood in stool, constipation, diarrhea, nausea and vomiting  Endocrine: Negative for polyuria  Genitourinary: Negative for decreased urine volume, difficulty urinating and hematuria  No nocturia   Musculoskeletal: Positive for arthralgias, back pain and myalgias  Neurological: Negative for dizziness and light-headedness  Hematological: Does not bruise/bleed easily  Psychiatric/Behavioral: Negative for dysphoric mood  Sleeps 5-6 hrs a night         Social History     Socioeconomic History    Marital status:       Spouse name: None    Number of children: None    Years of education: None    Highest education level: None   Occupational History    None   Social Needs    Financial resource strain: None    Food insecurity     Worry: None     Inability: None    Transportation needs     Medical: None     Non-medical: None   Tobacco Use    Smoking status: Never Smoker    Smokeless tobacco: Never Used   Substance and Sexual Activity    Alcohol use: Yes     Frequency: 2-4 times a month     Drinks per session: 7 to 9     Binge frequency: Weekly     Comment: liquor    Drug use: Never    Sexual activity: None   Lifestyle    Physical activity     Days per week: None     Minutes per session: None    Stress: None   Relationships    Social connections     Talks on phone: None     Gets together: None     Attends Che[employer] service: None     Active member of club or organization: None     Attends meetings of clubs or organizations: None     Relationship status: None    Intimate partner violence     Fear of current or ex partner: None     Emotionally abused: None     Physically abused: None     Forced sexual activity: None   Other Topics Concern    None   Social History Narrative    Does not consume caffeine      Past Medical History:   Diagnosis Date    Bladder cancer ([facility] [address] )     2018- surgery/ mitomycin    Chronic pain disorder     low back    Degenerative joint disease involving multiple joints     Gout     Herniated lumbar intervertebral disc     Hypertension     Prostate CA ([facility] [address] )     Tinnitus     Wears glasses     Wears partial dentures     uppers     Past Surgical History:   Procedure Laterality Date    CYSTOSCOPY  02/27/2019    CYSTOSCOPY  05/22/2020    MO BIOPSY OF PROSTATE,NEEDLE/PUNCH N/A 10/15/2019    Procedure: Cathy Pena NEEDLE BIOPSY PROSTATE, CYSTOSCOPY;  Surgeon: Phyllis Hebert MD;  [facility];  Service: Urology    MO CYSTOURETHROSCOPY,FULGUR <0 5 CM LESN N/A 11/14/2018    Procedure: TRANSURETHRAL RESECTION OF BLADDER TUMOR (TURBT);   Surgeon: Phyllis Hebert MD;  [facility];  Service: Urology    MO INSTILL ANTICANCER AGENT IN BLADDER N/A 11/14/2018    Procedure: Janice Sensor;  Surgeon: Phyllis Hebert MD;  [facility];  Service: Urology    MO LAP,PROSTATECTOMY,RADICAL,W/NERVE [address] ROBOTIC N/A 1/27/2020    Procedure: PROSTATECTOMY RADICAL W ROBOTICS; Bilateral Pelvic Lymph Node Dissection;  Surgeon: Per Whaley MD;  [facility];  Service: Urology    TONSILLECTOMY      WISDOM TOOTH EXTRACTION         Current Outpatient Medications:     acetaminophen (TYLENOL) 500 mg tablet, Take 1,000 mg by mouth every 6 (six) hours as needed for mild pain , Disp: , Rfl:     allopurinol (ZYLOPRIM) 100 mg tablet, TAKE 2 TABLETS DAILY EARLY IN THE MORNING, Disp: 180 tablet, Rfl: 3    lisinopril (ZESTRIL) 2 5 mg tablet, TAKE 1 TABLET DAILY EARLY IN THE MORNING, Disp: 90 tablet, Rfl: 3    methocarbamol (ROBAXIN) 750 mg tablet, Take 1 tablet (750 mg total) by mouth 2 (two) times a day as needed for muscle spasms, Disp: 30 tablet, Rfl: 3    Current Facility-Administered Medications:     meloxicam (MOBIC) tablet 15 mg, 15 mg, Oral, Once, Dominique Pacheco MD    Lab Results   Component Value Date    SODIUM 139 09/28/2020    K 3 9 09/28/2020     09/28/2020    CO2 28 09/28/2020    AGAP 8 09/28/2020    BUN 11 09/28/2020    CREATININE 1 16 09/28/2020    GLUC 88 01/28/2020    GLUF 88 09/28/2020    CALCIUM 8 7 09/28/2020    AST 20 09/28/2020    ALT 37 09/28/2020    ALKPHOS 54 09/28/2020    TP 7 6 09/28/2020    TBILI 0 60 09/28/2020    EGFR 66 09/28/2020     Lab Results   Component Value Date    WBC 8 58 09/28/2020    HGB 15 6 09/28/2020    HCT 45 3 09/28/2020    MCV 96 09/28/2020     09/28/2020     Lab Results   Component Value Date    CHOLESTEROL 134 09/28/2020    CHOLESTEROL 147 09/30/2019    CHOLESTEROL 150 07/06/2018     Lab Results   Component Value Date    HDL 40 09/28/2020    HDL 42 09/30/2019    HDL 42 07/06/2018     Lab Results   Component Value Date    LDLCALC 73 09/28/2020    LDLCALC 80 09/30/2019    LDLCALC 96 07/06/2018     Lab Results   Component Value Date    TRIG 106 09/28/2020    TRIG 125 09/30/2019    TRIG 62 07/06/2018     No results found for: CHOLHDL  No results found for: [id number], TSH  Lab Results   Component Value Date    CALCIUM 8 7 09/28/2020    PHOS 2 3 07/06/2018     No results found for: SPEP, UPEP  No results found for: QUENTIN [id number]        Objective:      /82   Pulse 65   Ht 5' 9" (1 753 m)   Wt 94 3 kg (208 lb)   SpO2 96%   BMI 30 72 kg/m²          Physical Exam  Constitutional:       General: He is not in acute distress  Appearance: He is normal weight  He is not diaphoretic  HENT:      Head: Normocephalic and atraumatic  Right Ear: External ear normal       Left Ear: External ear normal    Eyes:      Extraocular Movements: Extraocular movements intact  Pupils: Pupils are equal, round, and reactive to light  Neck:      Musculoskeletal: Normal range of motion  No neck rigidity  Vascular: No carotid bruit  Cardiovascular:      Rate and Rhythm: Normal rate and regular rhythm  Pulmonary:      Effort: Pulmonary effort is normal  No respiratory distress  Breath sounds: Normal breath sounds  No wheezing or rales  Abdominal:      General: Bowel sounds are normal  There is no distension  Palpations: Abdomen is soft  Tenderness: There is no abdominal tenderness  Musculoskeletal: Normal range of motion  Right lower leg: No edema  Left lower leg: No edema  Lymphadenopathy:      Cervical: No cervical adenopathy  Skin:     General: Skin is warm and dry  Neurological:      General: No focal deficit present  Mental Status: He is alert and oriented to person, place, and time  Psychiatric:         Mood and Affect: Mood normal          Behavior: Behavior normal          Thought Content:  Thought content normal          Judgment: Judgment normal

## 2021-04-15 ENCOUNTER — APPOINTMENT (OUTPATIENT)
Dept: LAB | Facility: HOSPITAL | Age: 66
End: 2021-04-15
Attending: INTERNAL MEDICINE
Payer: COMMERCIAL

## 2021-04-15 DIAGNOSIS — E78.5 HYPERLIPIDEMIA, UNSPECIFIED HYPERLIPIDEMIA TYPE: ICD-10-CM

## 2021-04-15 DIAGNOSIS — E55.9 VITAMIN D DEFICIENCY: ICD-10-CM

## 2021-04-15 DIAGNOSIS — I10 ESSENTIAL HYPERTENSION: ICD-10-CM

## 2021-04-15 DIAGNOSIS — N18.2 STAGE 2 CHRONIC KIDNEY DISEASE: ICD-10-CM

## 2021-04-15 LAB
25(OH)D3 SERPL-MCNC: 24.3 NG/ML (ref 30–100)
ALBUMIN SERPL BCP-MCNC: 3.8 G/DL (ref 3.5–5)
ALP SERPL-CCNC: 58 U/L (ref 46–116)
ALT SERPL W P-5'-P-CCNC: 37 U/L (ref 12–78)
ANION GAP SERPL CALCULATED.3IONS-SCNC: 7 MMOL/L (ref 4–13)
AST SERPL W P-5'-P-CCNC: 18 U/L (ref 5–45)
BACTERIA UR QL AUTO: NORMAL /HPF
BASOPHILS # BLD AUTO: 0.08 THOUSANDS/ΜL (ref 0–0.1)
BASOPHILS NFR BLD AUTO: 1 % (ref 0–1)
BILIRUB SERPL-MCNC: 0.39 MG/DL (ref 0.2–1)
BILIRUB UR QL STRIP: NEGATIVE
BUN SERPL-MCNC: 16 MG/DL (ref 5–25)
CALCIUM SERPL-MCNC: 8.5 MG/DL (ref 8.3–10.1)
CHLORIDE SERPL-SCNC: 104 MMOL/L (ref 100–108)
CHOLEST SERPL-MCNC: 140 MG/DL (ref 50–200)
CLARITY UR: CLEAR
CO2 SERPL-SCNC: 27 MMOL/L (ref 21–32)
COLOR UR: NORMAL
CREAT SERPL-MCNC: 1.16 MG/DL (ref 0.6–1.3)
CREAT UR-MCNC: 29.5 MG/DL
CREAT UR-MCNC: 29.5 MG/DL
EOSINOPHIL # BLD AUTO: 0.19 THOUSAND/ΜL (ref 0–0.61)
EOSINOPHIL NFR BLD AUTO: 3 % (ref 0–6)
ERYTHROCYTE [DISTWIDTH] IN BLOOD BY AUTOMATED COUNT: 12.7 % (ref 11.6–15.1)
GFR SERPL CREATININE-BSD FRML MDRD: 66 ML/MIN/1.73SQ M
GLUCOSE P FAST SERPL-MCNC: 90 MG/DL (ref 65–99)
GLUCOSE UR STRIP-MCNC: NEGATIVE MG/DL
HCT VFR BLD AUTO: 46.6 % (ref 36.5–49.3)
HDLC SERPL-MCNC: 37 MG/DL
HGB BLD-MCNC: 15.9 G/DL (ref 12–17)
HGB UR QL STRIP.AUTO: NEGATIVE
IMM GRANULOCYTES # BLD AUTO: 0.04 THOUSAND/UL (ref 0–0.2)
IMM GRANULOCYTES NFR BLD AUTO: 1 % (ref 0–2)
KETONES UR STRIP-MCNC: NEGATIVE MG/DL
LDLC SERPL CALC-MCNC: 80 MG/DL (ref 0–100)
LEUKOCYTE ESTERASE UR QL STRIP: NEGATIVE
LYMPHOCYTES # BLD AUTO: 3.11 THOUSANDS/ΜL (ref 0.6–4.47)
LYMPHOCYTES NFR BLD AUTO: 40 % (ref 14–44)
MCH RBC QN AUTO: 32.2 PG (ref 26.8–34.3)
MCHC RBC AUTO-ENTMCNC: 34.1 G/DL (ref 31.4–37.4)
MCV RBC AUTO: 94 FL (ref 82–98)
MICROALBUMIN UR-MCNC: <5 MG/L (ref 0–20)
MICROALBUMIN/CREAT 24H UR: <17 MG/G CREATININE (ref 0–30)
MONOCYTES # BLD AUTO: 1.13 THOUSAND/ΜL (ref 0.17–1.22)
MONOCYTES NFR BLD AUTO: 15 % (ref 4–12)
NEUTROPHILS # BLD AUTO: 3.02 THOUSANDS/ΜL (ref 1.85–7.62)
NEUTS SEG NFR BLD AUTO: 40 % (ref 43–75)
NITRITE UR QL STRIP: NEGATIVE
NON-SQ EPI CELLS URNS QL MICRO: NORMAL /HPF
NONHDLC SERPL-MCNC: 103 MG/DL
NRBC BLD AUTO-RTO: 0 /100 WBCS
PH UR STRIP.AUTO: 6 [PH]
PLATELET # BLD AUTO: 251 THOUSANDS/UL (ref 149–390)
PMV BLD AUTO: 9.2 FL (ref 8.9–12.7)
POTASSIUM SERPL-SCNC: 4 MMOL/L (ref 3.5–5.3)
PROT SERPL-MCNC: 7.7 G/DL (ref 6.4–8.2)
PROT UR STRIP-MCNC: NEGATIVE MG/DL
PROT UR-MCNC: <6 MG/DL
PROT/CREAT UR: <0.2 MG/G{CREAT} (ref 0–0.1)
RBC # BLD AUTO: 4.94 MILLION/UL (ref 3.88–5.62)
RBC #/AREA URNS AUTO: NORMAL /HPF
SODIUM SERPL-SCNC: 138 MMOL/L (ref 136–145)
SP GR UR STRIP.AUTO: <=1.005 (ref 1–1.03)
TRIGL SERPL-MCNC: 116 MG/DL
URATE SERPL-MCNC: 5.4 MG/DL (ref 4.2–8)
UROBILINOGEN UR QL STRIP.AUTO: 0.2 E.U./DL
WBC # BLD AUTO: 7.57 THOUSAND/UL (ref 4.31–10.16)
WBC #/AREA URNS AUTO: NORMAL /HPF

## 2021-04-15 PROCEDURE — 82306 VITAMIN D 25 HYDROXY: CPT

## 2021-04-15 PROCEDURE — 81001 URINALYSIS AUTO W/SCOPE: CPT

## 2021-04-15 PROCEDURE — 82043 UR ALBUMIN QUANTITATIVE: CPT

## 2021-04-15 PROCEDURE — 80061 LIPID PANEL: CPT

## 2021-04-15 PROCEDURE — 85025 COMPLETE CBC W/AUTO DIFF WBC: CPT

## 2021-04-15 PROCEDURE — 36415 COLL VENOUS BLD VENIPUNCTURE: CPT

## 2021-04-15 PROCEDURE — 82570 ASSAY OF URINE CREATININE: CPT

## 2021-04-15 PROCEDURE — 80053 COMPREHEN METABOLIC PANEL: CPT

## 2021-04-15 PROCEDURE — 84550 ASSAY OF BLOOD/URIC ACID: CPT

## 2021-04-15 PROCEDURE — 84156 ASSAY OF PROTEIN URINE: CPT

## 2021-04-19 DIAGNOSIS — E55.9 VITAMIN D DEFICIENCY: Primary | ICD-10-CM

## 2021-04-19 RX ORDER — ERGOCALCIFEROL (VITAMIN D2) 1250 MCG
50000 CAPSULE ORAL WEEKLY
Qty: 12 CAPSULE | Refills: 1 | Status: SHIPPED | OUTPATIENT
Start: 2021-04-19

## 2021-07-02 ENCOUNTER — APPOINTMENT (OUTPATIENT)
Dept: LAB | Facility: HOSPITAL | Age: 66
End: 2021-07-02
Attending: UROLOGY
Payer: COMMERCIAL

## 2021-07-02 DIAGNOSIS — C61 PROSTATE CANCER (HCC): ICD-10-CM

## 2021-07-02 LAB — PSA SERPL-MCNC: <0.1 NG/ML (ref 0–4)

## 2021-07-02 PROCEDURE — 36415 COLL VENOUS BLD VENIPUNCTURE: CPT

## 2021-07-02 PROCEDURE — 84153 ASSAY OF PSA TOTAL: CPT

## 2021-07-15 DIAGNOSIS — I10 ESSENTIAL HYPERTENSION: ICD-10-CM

## 2021-07-15 RX ORDER — LISINOPRIL 2.5 MG/1
TABLET ORAL
Qty: 90 TABLET | Refills: 3 | Status: SHIPPED | OUTPATIENT
Start: 2021-07-15 | End: 2022-07-05

## 2021-08-11 ENCOUNTER — TELEPHONE (OUTPATIENT)
Dept: NEPHROLOGY | Facility: CLINIC | Age: 66
End: 2021-08-11

## 2021-08-11 NOTE — TELEPHONE ENCOUNTER
Patient has an appt with you in October and he would like to know if you need him to have lab work before he comes in?

## 2021-08-11 NOTE — TELEPHONE ENCOUNTER
I will place labs but he has normal kidney function  He needs a regular PCP to follow him  Can you suggest one in his area?

## 2021-08-11 NOTE — TELEPHONE ENCOUNTER
I can see him periodically like once a year to check his kidney function and protein excretion but he needs to have a regular PCP for his health maintenance issues

## 2021-09-17 ENCOUNTER — APPOINTMENT (OUTPATIENT)
Dept: LAB | Facility: HOSPITAL | Age: 66
End: 2021-09-17
Attending: INTERNAL MEDICINE
Payer: COMMERCIAL

## 2021-09-17 DIAGNOSIS — I10 ESSENTIAL HYPERTENSION: ICD-10-CM

## 2021-09-17 LAB
ALBUMIN SERPL BCP-MCNC: 3.7 G/DL (ref 3.5–5)
ALP SERPL-CCNC: 55 U/L (ref 46–116)
ALT SERPL W P-5'-P-CCNC: 38 U/L (ref 12–78)
ANION GAP SERPL CALCULATED.3IONS-SCNC: 6 MMOL/L (ref 4–13)
AST SERPL W P-5'-P-CCNC: 20 U/L (ref 5–45)
BACTERIA UR QL AUTO: NORMAL /HPF
BASOPHILS # BLD AUTO: 0.08 THOUSANDS/ΜL (ref 0–0.1)
BASOPHILS NFR BLD AUTO: 1 % (ref 0–1)
BILIRUB SERPL-MCNC: 0.56 MG/DL (ref 0.2–1)
BILIRUB UR QL STRIP: NEGATIVE
BUN SERPL-MCNC: 17 MG/DL (ref 5–25)
CALCIUM SERPL-MCNC: 8.4 MG/DL (ref 8.3–10.1)
CHLORIDE SERPL-SCNC: 105 MMOL/L (ref 100–108)
CLARITY UR: CLEAR
CO2 SERPL-SCNC: 26 MMOL/L (ref 21–32)
COLOR UR: YELLOW
CREAT SERPL-MCNC: 1.08 MG/DL (ref 0.6–1.3)
CREAT UR-MCNC: 18.7 MG/DL
CREAT UR-MCNC: 18.7 MG/DL
EOSINOPHIL # BLD AUTO: 0.15 THOUSAND/ΜL (ref 0–0.61)
EOSINOPHIL NFR BLD AUTO: 2 % (ref 0–6)
ERYTHROCYTE [DISTWIDTH] IN BLOOD BY AUTOMATED COUNT: 12.6 % (ref 11.6–15.1)
GFR SERPL CREATININE-BSD FRML MDRD: 72 ML/MIN/1.73SQ M
GLUCOSE P FAST SERPL-MCNC: 87 MG/DL (ref 65–99)
GLUCOSE UR STRIP-MCNC: NEGATIVE MG/DL
HCT VFR BLD AUTO: 44.9 % (ref 36.5–49.3)
HGB BLD-MCNC: 15.2 G/DL (ref 12–17)
HGB UR QL STRIP.AUTO: NEGATIVE
IMM GRANULOCYTES # BLD AUTO: 0.03 THOUSAND/UL (ref 0–0.2)
IMM GRANULOCYTES NFR BLD AUTO: 0 % (ref 0–2)
KETONES UR STRIP-MCNC: NEGATIVE MG/DL
LEUKOCYTE ESTERASE UR QL STRIP: NEGATIVE
LYMPHOCYTES # BLD AUTO: 2.94 THOUSANDS/ΜL (ref 0.6–4.47)
LYMPHOCYTES NFR BLD AUTO: 38 % (ref 14–44)
MCH RBC QN AUTO: 32.5 PG (ref 26.8–34.3)
MCHC RBC AUTO-ENTMCNC: 33.9 G/DL (ref 31.4–37.4)
MCV RBC AUTO: 96 FL (ref 82–98)
MICROALBUMIN UR-MCNC: <5 MG/L (ref 0–20)
MICROALBUMIN/CREAT 24H UR: <27 MG/G CREATININE (ref 0–30)
MONOCYTES # BLD AUTO: 0.98 THOUSAND/ΜL (ref 0.17–1.22)
MONOCYTES NFR BLD AUTO: 13 % (ref 4–12)
NEUTROPHILS # BLD AUTO: 3.6 THOUSANDS/ΜL (ref 1.85–7.62)
NEUTS SEG NFR BLD AUTO: 46 % (ref 43–75)
NITRITE UR QL STRIP: NEGATIVE
NON-SQ EPI CELLS URNS QL MICRO: NORMAL /HPF
NRBC BLD AUTO-RTO: 0 /100 WBCS
PH UR STRIP.AUTO: 5.5 [PH]
PLATELET # BLD AUTO: 245 THOUSANDS/UL (ref 149–390)
PMV BLD AUTO: 9.3 FL (ref 8.9–12.7)
POTASSIUM SERPL-SCNC: 3.9 MMOL/L (ref 3.5–5.3)
PROT SERPL-MCNC: 7.3 G/DL (ref 6.4–8.2)
PROT UR STRIP-MCNC: NEGATIVE MG/DL
PROT UR-MCNC: <6 MG/DL
PROT/CREAT UR: <0.32 MG/G{CREAT} (ref 0–0.1)
RBC # BLD AUTO: 4.67 MILLION/UL (ref 3.88–5.62)
RBC #/AREA URNS AUTO: NORMAL /HPF
SODIUM SERPL-SCNC: 137 MMOL/L (ref 136–145)
SP GR UR STRIP.AUTO: <=1.005 (ref 1–1.03)
URATE SERPL-MCNC: 5.6 MG/DL (ref 4.2–8)
UROBILINOGEN UR QL STRIP.AUTO: 0.2 E.U./DL
WBC # BLD AUTO: 7.78 THOUSAND/UL (ref 4.31–10.16)
WBC #/AREA URNS AUTO: NORMAL /HPF

## 2021-09-17 PROCEDURE — 81001 URINALYSIS AUTO W/SCOPE: CPT

## 2021-09-17 PROCEDURE — 84550 ASSAY OF BLOOD/URIC ACID: CPT

## 2021-09-17 PROCEDURE — 80053 COMPREHEN METABOLIC PANEL: CPT

## 2021-09-17 PROCEDURE — 84156 ASSAY OF PROTEIN URINE: CPT

## 2021-09-17 PROCEDURE — 36415 COLL VENOUS BLD VENIPUNCTURE: CPT

## 2021-09-17 PROCEDURE — 82570 ASSAY OF URINE CREATININE: CPT

## 2021-09-17 PROCEDURE — 85025 COMPLETE CBC W/AUTO DIFF WBC: CPT

## 2021-09-17 PROCEDURE — 82043 UR ALBUMIN QUANTITATIVE: CPT

## 2021-10-07 ENCOUNTER — OFFICE VISIT (OUTPATIENT)
Dept: NEPHROLOGY | Facility: CLINIC | Age: 66
End: 2021-10-07
Payer: COMMERCIAL

## 2021-10-07 VITALS
HEIGHT: 69 IN | SYSTOLIC BLOOD PRESSURE: 136 MMHG | HEART RATE: 74 BPM | OXYGEN SATURATION: 98 % | DIASTOLIC BLOOD PRESSURE: 78 MMHG | BODY MASS INDEX: 31.25 KG/M2 | WEIGHT: 211 LBS

## 2021-10-07 DIAGNOSIS — I10 ESSENTIAL HYPERTENSION: ICD-10-CM

## 2021-10-07 DIAGNOSIS — Z23 ENCOUNTER FOR ADMINISTRATION OF VACCINE: ICD-10-CM

## 2021-10-07 DIAGNOSIS — Z12.11 ENCOUNTER FOR SCREENING COLONOSCOPY: Primary | ICD-10-CM

## 2021-10-07 DIAGNOSIS — E79.0 HYPERURICEMIA: ICD-10-CM

## 2021-10-07 DIAGNOSIS — N18.2 STAGE 2 CHRONIC KIDNEY DISEASE: ICD-10-CM

## 2021-10-07 DIAGNOSIS — C67.9 MALIGNANT NEOPLASM OF URINARY BLADDER, UNSPECIFIED SITE (HCC): ICD-10-CM

## 2021-10-07 PROCEDURE — 99215 OFFICE O/P EST HI 40 MIN: CPT | Performed by: INTERNAL MEDICINE

## 2021-10-07 PROCEDURE — G0008 ADMIN INFLUENZA VIRUS VAC: HCPCS

## 2021-10-07 PROCEDURE — 90662 IIV NO PRSV INCREASED AG IM: CPT

## 2021-11-05 ENCOUNTER — PROCEDURE VISIT (OUTPATIENT)
Dept: UROLOGY | Facility: CLINIC | Age: 66
End: 2021-11-05
Payer: COMMERCIAL

## 2021-11-05 VITALS
DIASTOLIC BLOOD PRESSURE: 88 MMHG | SYSTOLIC BLOOD PRESSURE: 142 MMHG | HEIGHT: 69 IN | HEART RATE: 69 BPM | BODY MASS INDEX: 31.64 KG/M2 | WEIGHT: 213.6 LBS

## 2021-11-05 DIAGNOSIS — C61 PROSTATE CANCER (HCC): ICD-10-CM

## 2021-11-05 DIAGNOSIS — C67.0 MALIGNANT NEOPLASM OF TRIGONE OF URINARY BLADDER (HCC): Primary | ICD-10-CM

## 2021-11-05 LAB
SL AMB  POCT GLUCOSE, UA: NORMAL
SL AMB LEUKOCYTE ESTERASE,UA: NORMAL
SL AMB POCT BILIRUBIN,UA: NORMAL
SL AMB POCT BLOOD,UA: NORMAL
SL AMB POCT CLARITY,UA: CLEAR
SL AMB POCT COLOR,UA: YELLOW
SL AMB POCT KETONES,UA: NORMAL
SL AMB POCT NITRITE,UA: NORMAL
SL AMB POCT PH,UA: 5
SL AMB POCT SPECIFIC GRAVITY,UA: 1.02
SL AMB POCT URINE PROTEIN: NORMAL
SL AMB POCT UROBILINOGEN: 0.2

## 2021-11-05 PROCEDURE — 52000 CYSTOURETHROSCOPY: CPT | Performed by: UROLOGY

## 2021-11-05 PROCEDURE — 81002 URINALYSIS NONAUTO W/O SCOPE: CPT | Performed by: UROLOGY

## 2021-11-05 PROCEDURE — 99213 OFFICE O/P EST LOW 20 MIN: CPT | Performed by: UROLOGY

## 2022-02-21 DIAGNOSIS — E79.0 HYPERURICEMIA: ICD-10-CM

## 2022-02-21 RX ORDER — ALLOPURINOL 100 MG/1
TABLET ORAL
Qty: 180 TABLET | Refills: 3 | Status: SHIPPED | OUTPATIENT
Start: 2022-02-21

## 2022-07-04 DIAGNOSIS — I10 ESSENTIAL HYPERTENSION: ICD-10-CM

## 2022-07-05 RX ORDER — LISINOPRIL 2.5 MG/1
TABLET ORAL
Qty: 90 TABLET | Refills: 3 | Status: SHIPPED | OUTPATIENT
Start: 2022-07-05

## 2022-10-31 ENCOUNTER — APPOINTMENT (OUTPATIENT)
Dept: LAB | Facility: CLINIC | Age: 67
End: 2022-10-31

## 2022-10-31 ENCOUNTER — OFFICE VISIT (OUTPATIENT)
Dept: INTERNAL MEDICINE CLINIC | Facility: CLINIC | Age: 67
End: 2022-10-31

## 2022-10-31 VITALS
TEMPERATURE: 97.3 F | OXYGEN SATURATION: 98 % | HEIGHT: 69 IN | DIASTOLIC BLOOD PRESSURE: 82 MMHG | BODY MASS INDEX: 30.36 KG/M2 | HEART RATE: 71 BPM | SYSTOLIC BLOOD PRESSURE: 138 MMHG | WEIGHT: 205 LBS

## 2022-10-31 DIAGNOSIS — C61 PROSTATE CANCER (HCC): ICD-10-CM

## 2022-10-31 DIAGNOSIS — C67.0 MALIGNANT NEOPLASM OF TRIGONE OF URINARY BLADDER (HCC): ICD-10-CM

## 2022-10-31 DIAGNOSIS — I10 ESSENTIAL HYPERTENSION: ICD-10-CM

## 2022-10-31 DIAGNOSIS — E55.9 VITAMIN D DEFICIENCY: ICD-10-CM

## 2022-10-31 DIAGNOSIS — E78.00 PURE HYPERCHOLESTEROLEMIA: ICD-10-CM

## 2022-10-31 DIAGNOSIS — N18.2 STAGE 2 CHRONIC KIDNEY DISEASE: ICD-10-CM

## 2022-10-31 DIAGNOSIS — C18.7 MALIGNANT NEOPLASM OF SIGMOID COLON (HCC): Primary | ICD-10-CM

## 2022-10-31 LAB
25(OH)D3 SERPL-MCNC: 27.4 NG/ML (ref 30–100)
CHOLEST SERPL-MCNC: 146 MG/DL
HDLC SERPL-MCNC: 37 MG/DL
LDLC SERPL CALC-MCNC: 92 MG/DL (ref 0–100)
NONHDLC SERPL-MCNC: 109 MG/DL
PSA SERPL-MCNC: <0.1 NG/ML (ref 0–4)
TRIGL SERPL-MCNC: 86 MG/DL
TSH SERPL DL<=0.05 MIU/L-ACNC: 1.42 UIU/ML (ref 0.45–4.5)

## 2022-10-31 NOTE — PROGRESS NOTES
Assessment and Plan: Did have labs done for upcoming surgery and EKG which were normal patient is cleared for surgery  Up to date on screenings and vaccines  Will repeat lipid and TSH  Will follow back up in 6 months or sooner if need be  Problem List Items Addressed This Visit        Digestive    Malignant neoplasm of sigmoid colon ([facility] [address] ) - Primary       Cardiovascular and Mediastinum    Essential hypertension    Relevant Orders    TSH, 3rd generation with Free T4 reflex       Genitourinary    Malignant neoplasm of trigone of urinary bladder (HCC)    Relevant Orders    TSH, 3rd generation with Free T4 reflex    Prostate cancer ([facility] [address] )    Relevant Orders    TSH, 3rd generation with Free T4 reflex    Stage 2 chronic kidney disease    Relevant Orders    TSH, 3rd generation with Free T4 reflex       Other    Pure hypercholesterolemia    Relevant Orders    TSH, 3rd generation with Free T4 reflex    Lipid panel    BMI 30 0-30 9,adult    Vitamin D deficiency    Relevant Orders    Vitamin D 25 hydroxy           Preventive health issues were discussed with patient, and age appropriate screening tests were ordered as noted in patient's After Visit Summary  Personalized health advice and appropriate referrals for health education or preventive services given if needed, as noted in patient's After Visit Summary  History of Present Illness:     Patient presents for a Medicare Wellness Visit    Son aMllory is to establish care and medicare wellness  He has a history or prostate CA with a recent history of colon CA  He is having a resection done with LV on the 10th  He does see Urology for routinne screenings with repeat imaging  He is having a PSA done today  He denies any other medical issues  He is up to date on his screenings and vaccines  He denies any chest pain, SOB, or palpitations  He denies any depression or anxiety  He did have covid in the past and did will with it  He offers no other issues       Patient Care Team:  Rupal Sommer as PCP - General (Family Medicine)  Brett Poole MD (Radiation Oncology)  Omar Rubin MD (Urology)     Review of Systems:     Review of Systems   All other systems reviewed and are negative  Problem List:     Patient Active Problem List   Diagnosis   • Malignant neoplasm of trigone of urinary bladder (HCC)   • Screening for prostate cancer   • Malignant neoplasm of urinary bladder (HCC)   • Abnormal prostate exam   • Prostate cancer ([facility] [address] )   • Hyperuricemia   • Pure hypercholesterolemia   • Essential hypertension   • Stage 2 chronic kidney disease   • Persistent proteinuria   • Acute exacerbation of chronic low back pain   • BMI 30 0-30 9,adult   • Vitamin D deficiency   • Malignant neoplasm of sigmoid colon [facility])      Past Medical and Surgical History:     Past Medical History:   Diagnosis Date   • Bladder cancer ([facility] [address] )     2018- surgery/ mitomycin   • Chronic pain disorder     low back   • Degenerative joint disease involving multiple joints    • Gout    • Herniated lumbar intervertebral disc    • Hypertension    • Prostate CA ([facility] [address] )    • Tinnitus    • Wears glasses    • Wears partial dentures     uppers     Past Surgical History:   Procedure Laterality Date   • CYSTOSCOPY  02/27/2019   • CYSTOSCOPY  05/22/2020   • ID BIOPSY OF PROSTATE,NEEDLE/PUNCH N/A 10/15/2019    Procedure: Alhaji Singh NEEDLE BIOPSY PROSTATE, CYSTOSCOPY;  Surgeon: Omar Rubin MD;  [facility];  Service: Urology   • ID CYSTOURETHROSCOPY,FULGUR <0 5 CM LESN N/A 11/14/2018    Procedure: TRANSURETHRAL RESECTION OF BLADDER TUMOR (TURBT);   Surgeon: Omar Rubin MD;  [facility];  Service: Urology   • ID INSTILL ANTICANCER AGENT IN BLADDER N/A 11/14/2018    Procedure: Quenten Signs;  Surgeon: Omar Rubin MD;  [facility];  Service: Urology   • ID LAP,PROSTATECTOMY,RADICAL,W/NERVE SPARE,INCL ROBOTIC N/A 1/27/2020    Procedure: PROSTATECTOMY RADICAL W ROBOTICS; Bilateral Pelvic Lymph Node Dissection;  Surgeon: Christi De La Torre MD;  [facility];  Service: Urology   • TONSILLECTOMY     • WISDOM TOOTH EXTRACTION        Family History:     Family History   Problem Relation Age of Onset   • Heart attack Father    • Throat cancer Father    • Cancer Father         throat cancer   • Heart disease Mother       Social History:     Social History     Socioeconomic History   • Marital status:      Spouse name: None   • Number of children: None   • Years of education: None   • Highest education level: None   Occupational History   • None   Tobacco Use   • Smoking status: Never Smoker   • Smokeless tobacco: Never Used   Vaping Use   • Vaping Use: Never used   Substance and Sexual Activity   • Alcohol use: Yes     Comment: liquor   • Drug use: Never   • Sexual activity: None   Other Topics Concern   • None   Social History Narrative    Does not consume caffeine      Social Determinants of Health     Financial Resource Strain: Low Risk    • Difficulty of Paying Living Expenses: Not hard at all   Food Insecurity: Not on file   Transportation Needs: No Transportation Needs   • Lack of Transportation (Medical): No   • Lack of Transportation (Non-Medical): No   Physical Activity: Not on file   Stress: Not on file   Social Connections: Not on file   Intimate Partner Violence: Not on file   Housing Stability: Not on file      Medications and Allergies:     Current Outpatient Medications   Medication Sig Dispense Refill   • allopurinol (ZYLOPRIM) 100 mg tablet TAKE 2 TABLETS DAILY EARLY IN THE MORNING 180 tablet 3   • lisinopril (ZESTRIL) 2 5 mg tablet TAKE 1 TABLET DAILY EARLY IN THE MORNING 90 tablet 3     No current facility-administered medications for this visit       Allergies   Allergen Reactions   • Nsaids Tinnitus     AVOIDS due to tinnitus      Immunizations:     Immunization History   Administered Date(s) Administered   • COVID-19 Pfizer vac (Donnell-sucrose, gray cap) 12 yr+ IM 03/26/2021, 04/16/2021, 10/18/2021, 04/09/2022   • Influenza, high dose seasonal 0 7 mL 10/07/2021   • Influenza, recombinant, quadrivalent,injectable, preservative free 10/14/2020   • Influenza, seasonal, injectable 10/24/2022      Health Maintenance:         Topic Date Due   • Hepatitis C Screening  11/30/2024 (Originally 1955)         Topic Date Due   • Pneumococcal Vaccine: 65+ Years (1 - PCV) Never done      Medicare Screening Tests and Risk Assessments:     Jameel Morillo is here for his Initial Wellness visit  Last Medicare Wellness visit information reviewed, patient interviewed and updates made to the record today  Health Risk Assessment:   Patient rates overall health as very good  Patient feels that their physical health rating is same  Patient is very satisfied with their life  Eyesight was rated as same  Hearing was rated as same  Patient feels that their emotional and mental health rating is same  Patients states they are never, rarely angry  Patient states they are sometimes unusually tired/fatigued  Pain experienced in the last 7 days has been some  Patient's pain rating has been 3/10  Patient states that he has experienced no weight loss or gain in last 6 months  Depression Screening:   PHQ-2 Score: 0      Fall Risk Screening: In the past year, patient has experienced: no history of falling in past year      Home Safety:  Patient does not have trouble with stairs inside or outside of their home  Patient has working smoke alarms and has working carbon monoxide detector  Home safety hazards include: none  Nutrition:   Current diet is Regular  Medications:   Patient is currently taking over-the-counter supplements  OTC medications include: see medication list  Patient is able to manage medications       Activities of Daily Living (ADLs)/Instrumental Activities of Daily Living (IADLs):   Walk and transfer into and out of bed and chair?: Yes  Dress and groom yourself?: Yes    Bathe or shower yourself?: Yes    Feed yourself? Yes  Do your laundry/housekeeping?: Yes  Manage your money, pay your bills and track your expenses?: Yes  Make your own meals?: Yes    Do your own shopping?: Yes    Previous Hospitalizations:   Any hospitalizations or ED visits within the last 12 months?: No      Advance Care Planning:   Living will: No    Durable POA for healthcare: No    Advanced directive: No    Advanced directive counseling given: No    Five wishes given: No    Patient declined ACP directive: No    End of Life Decisions reviewed with patient: No    Provider agrees with end of life decisions: Yes      Cognitive Screening:   Provider or family/friend/caregiver concerned regarding cognition?: No    PREVENTIVE SCREENINGS      Cardiovascular Screening:    General: History Lipid Disorder and Risks and Benefits Discussed    Due for: Lipid Panel, Cholesterol and Triglycerides      Diabetes Screening:     General: Risks and Benefits Discussed    Due for: Blood Glucose      Colorectal Cancer Screening:     General: Screening Current      Prostate Cancer Screening:    General: History Prostate Cancer      Osteoporosis Screening:    General: Screening Not Indicated      Abdominal Aortic Aneurysm (AAA) Screening:    Risk factors include: age between 73-67 yo        Lung Cancer Screening:     General: Screening Not Indicated      Hepatitis C Screening:    General: Screening Current    Screening, Brief Intervention, and Referral to Treatment (SBIRT)    Screening  Typical number of drinks in a day: 0  Typical number of drinks in a week: 2  Interpretation: Low risk drinking behavior      Single Item Drug Screening:  How often have you used an illegal drug (including marijuana) or a prescription medication for non-medical reasons in the past year? never    Single Item Drug Screen Score: 0  Interpretation: Negative screen for possible drug use disorder    No exam data present     Physical Exam:     /82 (BP Location: Left arm, Patient Position: Sitting, Cuff Size: Large)   Pulse 71   Temp (!) 97 3 °F (36 3 °C)   Ht 5' 9" (1 753 m)   Wt 93 kg (205 lb)   SpO2 98%   BMI 30 27 kg/m²     Physical Exam  Vitals reviewed  Constitutional:       Appearance: Normal appearance  He is normal weight  HENT:      Head: Normocephalic and atraumatic  Right Ear: Tympanic membrane, ear canal and external ear normal       Left Ear: Tympanic membrane, ear canal and external ear normal       Nose: Nose normal       Mouth/Throat:      Mouth: Mucous membranes are moist       Pharynx: Oropharynx is clear  Eyes:      Extraocular Movements: Extraocular movements intact  Conjunctiva/sclera: Conjunctivae normal       Pupils: Pupils are equal, round, and reactive to light  Cardiovascular:      Rate and Rhythm: Normal rate and regular rhythm  Pulses: Normal pulses  Heart sounds: Normal heart sounds  Pulmonary:      Effort: Pulmonary effort is normal       Breath sounds: Normal breath sounds  Abdominal:      General: Abdomen is flat  Bowel sounds are normal       Palpations: Abdomen is soft  Musculoskeletal:         General: Normal range of motion  Cervical back: Normal range of motion and neck supple  Skin:     General: Skin is warm and dry  Capillary Refill: Capillary refill takes less than 2 seconds  Neurological:      General: No focal deficit present  Mental Status: He is alert and oriented to person, place, and time  Mental status is at baseline  Psychiatric:         Mood and Affect: Mood normal          Behavior: Behavior normal          Thought Content: Thought content normal          Judgment: Judgment normal           HUSSEIN Pang  BMI Counseling: Body mass index is 30 27 kg/m²   The BMI is above normal  Nutrition recommendations include reducing portion sizes, decreasing overall calorie intake, 3-5 servings of fruits/vegetables daily, reducing fast food intake, consuming healthier snacks, decreasing soda and/or juice intake, moderation in carbohydrate intake, increasing intake of lean protein, reducing intake of saturated fat and trans fat and reducing intake of cholesterol

## 2022-10-31 NOTE — PATIENT INSTRUCTIONS
Medicare Preventive Visit Patient Instructions  Thank you for completing your Welcome to Medicare Visit or Medicare Annual Wellness Visit today  Your next wellness visit will be due in one year (11/1/2023)  The screening/preventive services that you may require over the next 5-10 years are detailed below  Some tests may not apply to you based off risk factors and/or age  Screening tests ordered at today's visit but not completed yet may show as past due  Also, please note that scanned in results may not display below  Preventive Screenings:  Service Recommendations Previous Testing/Comments   Colorectal Cancer Screening  · Colonoscopy    · Fecal Occult Blood Test (FOBT)/Fecal Immunochemical Test (FIT)  · Fecal DNA/Cologuard Test  · Flexible Sigmoidoscopy Age: 39-70 years old   Colonoscopy: every 10 years (May be performed more frequently if at higher risk)  OR  FOBT/FIT: every 1 year  OR  Cologuard: every 3 years  OR  Sigmoidoscopy: every 5 years  Screening may be recommended earlier than age 39 if at higher risk for colorectal cancer  Also, an individualized decision between you and your healthcare provider will decide whether screening between the ages of 74-80 would be appropriate   Colonoscopy: Not on file  FOBT/FIT: Not on file  Cologuard: Not on file  Sigmoidoscopy: Not on file          Prostate Cancer Screening Individualized decision between patient and health care provider in men between ages of 53-78   Medicare will cover every 12 months beginning on the day after your 50th birthday PSA: <0 1 ng/mL     History Prostate Cancer     Hepatitis C Screening Once for adults born between 1945 and 1965  More frequently in patients at high risk for Hepatitis C Hep C Antibody: Not on file        Diabetes Screening 1-2 times per year if you're at risk for diabetes or have pre-diabetes Fasting glucose: 87 mg/dL (9/17/2021)  A1C: No results in last 5 years (No results in last 5 years)      Cholesterol Screening Once every 5 years if you don't have a lipid disorder  May order more often based on risk factors  Lipid panel: 04/15/2021  Screening Not Indicated  History Lipid Disorder      Other Preventive Screenings Covered by Medicare:  1  Abdominal Aortic Aneurysm (AAA) Screening: covered once if your at risk  You're considered to be at risk if you have a family history of AAA or a male between the age of 73-68 who smoking at least 100 cigarettes in your lifetime  2  Lung Cancer Screening: covers low dose CT scan once per year if you meet all of the following conditions: (1) Age 50-69; (2) No signs or symptoms of lung cancer; (3) Current smoker or have quit smoking within the last 15 years; (4) You have a tobacco smoking history of at least 20 pack years (packs per day x number of years you smoked); (5) You get a written order from a healthcare provider  3  Glaucoma Screening: covered annually if you're considered high risk: (1) You have diabetes OR (2) Family history of glaucoma OR (3)  aged 48 and older OR (3)  American aged 72 and older  3  Osteoporosis Screening: covered every 2 years if you meet one of the following conditions: (1) Have a vertebral abnormality; (2) On glucocorticoid therapy for more than 3 months; (3) Have primary hyperparathyroidism; (4) On osteoporosis medications and need to assess response to drug therapy  5  HIV Screening: covered annually if you're between the age of 12-76  Also covered annually if you are younger than 13 and older than 72 with risk factors for HIV infection  For pregnant patients, it is covered up to 3 times per pregnancy      Immunizations:  Immunization Recommendations   Influenza Vaccine Annual influenza vaccination during flu season is recommended for all persons aged >= 6 months who do not have contraindications   Pneumococcal Vaccine   * Pneumococcal conjugate vaccine = PCV13 (Prevnar 13), PCV15 (Vaxneuvance), PCV20 (Prevnar 20)  * Pneumococcal polysaccharide vaccine = PPSV23 (Pneumovax) Adults 2364 years old: 1-3 doses may be recommended based on certain risk factors  Adults 72 years old: 1-2 doses may be recommended based off what pneumonia vaccine you previously received   Hepatitis B Vaccine 3 dose series if at intermediate or high risk (ex: diabetes, end stage renal disease, liver disease)   Tetanus (Td) Vaccine - COST NOT COVERED BY MEDICARE PART B Following completion of primary series, a booster dose should be given every 10 years to maintain immunity against tetanus  Td may also be given as tetanus wound prophylaxis  Tdap Vaccine - COST NOT COVERED BY MEDICARE PART B Recommended at least once for all adults  For pregnant patients, recommended with each pregnancy  Shingles Vaccine (Shingrix) - COST NOT COVERED BY MEDICARE PART B  2 shot series recommended in those aged 48 and above     Health Maintenance Due:      Topic Date Due   • Hepatitis C Screening  Never done   • Colorectal Cancer Screening  Never done     Immunizations Due:      Topic Date Due   • Pneumococcal Vaccine: 65+ Years (1 - PCV) Never done     Advance Directives   What are advance directives? Advance directives are legal documents that state your wishes and plans for medical care  These plans are made ahead of time in case you lose your ability to make decisions for yourself  Advance directives can apply to any medical decision, such as the treatments you want, and if you want to donate organs  What are the types of advance directives? There are many types of advance directives, and each state has rules about how to use them  You may choose a combination of any of the following:  · Living will: This is a written record of the treatment you want  You can also choose which treatments you do not want, which to limit, and which to stop at a certain time  This includes surgery, medicine, IV fluid, and tube feedings  · Durable power of  for healthcare [facility]):   This is a written record that states who you want to make healthcare choices for you when you are unable to make them for yourself  This person, called a proxy, is usually a family member or a friend  You may choose more than 1 proxy  · Do not resuscitate (DNR) order:  A DNR order is used in case your heart stops beating or you stop breathing  It is a request not to have certain forms of treatment, such as CPR  A DNR order may be included in other types of advance directives  · Medical directive: This covers the care that you want if you are in a coma, near death, or unable to make decisions for yourself  You can list the treatments you want for each condition  Treatment may include pain medicine, surgery, blood transfusions, dialysis, IV or tube feedings, and a ventilator (breathing machine)  · Values history: This document has questions about your views, beliefs, and how you feel and think about life  This information can help others choose the care that you would choose  Why are advance directives important? An advance directive helps you control your care  Although spoken wishes may be used, it is better to have your wishes written down  Spoken wishes can be misunderstood, or not followed  Treatments may be given even if you do not want them  An advance directive may make it easier for your family to make difficult choices about your care  Weight Management   Why it is important to manage your weight:  Being overweight increases your risk of health conditions such as heart disease, high blood pressure, type 2 diabetes, and certain types of cancer  It can also increase your risk for osteoarthritis, sleep apnea, and other respiratory problems  Aim for a slow, steady weight loss  Even a small amount of weight loss can lower your risk of health problems  How to lose weight safely:  A safe and healthy way to lose weight is to eat fewer calories and get regular exercise   You can lose up about 1 pound a week by decreasing the number of calories you eat by 500 calories each day  Healthy meal plan for weight management:  A healthy meal plan includes a variety of foods, contains fewer calories, and helps you stay healthy  A healthy meal plan includes the following:  · Eat whole-grain foods more often  A healthy meal plan should contain fiber  Fiber is the part of grains, fruits, and vegetables that is not broken down by your body  Whole-grain foods are healthy and provide extra fiber in your diet  Some examples of whole-grain foods are whole-wheat breads and pastas, oatmeal, brown rice, and bulgur  · Eat a variety of vegetables every day  Include dark, leafy greens such as spinach, kale, jordon greens, and mustard greens  Eat yellow and orange vegetables such as carrots, sweet potatoes, and winter squash  · Eat a variety of fruits every day  Choose fresh or canned fruit (canned in its own juice or light syrup) instead of juice  Fruit juice has very little or no fiber  · Eat low-fat dairy foods  Drink fat-free (skim) milk or 1% milk  Eat fat-free yogurt and low-fat cottage cheese  Try low-fat cheeses such as mozzarella and other reduced-fat cheeses  · Choose meat and other protein foods that are low in fat  Choose beans or other legumes such as split peas or lentils  Choose fish, skinless poultry (chicken or turkey), or lean cuts of red meat (beef or pork)  Before you cook meat or poultry, cut off any visible fat  · Use less fat and oil  Try baking foods instead of frying them  Add less fat, such as margarine, sour cream, regular salad dressing and mayonnaise to foods  Eat fewer high-fat foods  Some examples of high-fat foods include french fries, doughnuts, ice cream, and cakes  · Eat fewer sweets  Limit foods and drinks that are high in sugar  This includes candy, cookies, regular soda, and sweetened drinks  Exercise:  Exercise at least 30 minutes per day on most days of the week   Some examples of exercise include walking, biking, dancing, and swimming  You can also fit in more physical activity by taking the stairs instead of the elevator or parking farther away from stores  Ask your healthcare provider about the best exercise plan for you  © Copyright [employer] 2018 Information is for End User's use only and may not be sold, redistributed or otherwise used for commercial purposes  All illustrations and images included in CareNotes® are the copyrighted property of [employer] A M [employer]  or [facility] Froilan Ovalle   Low Fat Diet   AMBULATORY CARE:   A low-fat diet  is an eating plan that is low in total fat, unhealthy fat, and cholesterol  You may need to follow a low-fat diet if you have trouble digesting or absorbing fat  You may also need to follow this diet if you have high cholesterol  You can also lower your cholesterol by increasing the amount of fiber in your diet  Soluble fiber is a type of fiber that helps to decrease cholesterol levels  Different types of fat in food:   · Limit unhealthy fats  A diet that is high in cholesterol, saturated fat, and trans fat may cause unhealthy cholesterol levels  Unhealthy cholesterol levels increase your risk of heart disease  ? Cholesterol:  Limit intake of cholesterol to less than 200 mg per day  Cholesterol is found in meat, eggs, and dairy  ? Saturated fat:  Limit saturated fat to less than 7% of your total daily calories  Ask your dietitian how many calories you need each day  Saturated fat is found in butter, cheese, ice cream, whole milk, and palm oil  Saturated fat is also found in meat, such as beef, pork, chicken skin, and processed meats  Processed meats include sausage, hot dogs, and bologna  ? Trans fat:  Avoid trans fat as much as possible  Trans fat is used in fried and baked foods  Foods that say trans fat free on the label may still have up to 0 5 grams of trans fat per serving  · Include healthy fats    Replace foods that are high in saturated and trans fat with foods high in healthy fats  This may help to decrease high cholesterol levels  ? Monounsaturated fats: These are found in avocados, nuts, and vegetable oils, such as olive, canola, and sunflower oil  ? Polyunsaturated fats: These can be found in vegetable oils, such as soybean or corn oil  Omega-3 fats can help to decrease the risk of heart disease  Omega-3 fats are found in fish, such as salmon, herring, trout, and tuna  Omega-3 fats can also be found in plant foods, such as walnuts, flaxseed, soybeans, and canola oil  Foods to limit or avoid:   · Grains:      ? Snacks that are made with partially hydrogenated oils, such as chips, regular crackers, and butter-flavored popcorn    ? High-fat baked goods, such as biscuits, croissants, doughnuts, pies, cookies, and pastries    · Dairy:      ? Whole milk, 2% milk, and yogurt and ice cream made with whole milk    ? Half and half creamer, heavy cream, and whipping cream    ? Cheese, cream cheese, and sour cream    · Meats and proteins:      ? High-fat cuts of meat (T-bone steak, regular hamburger, and ribs)    ? Fried meat, poultry (turkey and chicken), and fish    ? Poultry (chicken and turkey) with skin    ? Cold cuts (salami or bologna), hot dogs, mueller, and sausage    ? Whole eggs and egg yolks    · Vegetables and fruits with added fat:      ? Fried vegetables or vegetables in butter or high-fat sauces, such as cream or cheese sauces    ? Fried fruit or fruit served with butter or cream    · Fats:      ? Butter, stick margarine, and shortening    ? Coconut, palm oil, and palm kernel oil    Foods to include:   · Grains:      ? Whole-grain breads, cereals, pasta, and brown rice    ? Low-fat crackers and pretzels    · Vegetables and fruits:      ? Fresh, frozen, or canned vegetables (no salt or low-sodium)    ? Fresh, frozen, dried, or canned fruit (canned in light syrup or fruit juice)    ?  Avocado    · Low-fat dairy products: ? Nonfat (skim) or 1% milk    ? Nonfat or low-fat cheese, yogurt, and cottage cheese    · Meats and proteins:      ? Chicken or turkey with no skin    ? Baked or broiled fish    ? Lean beef and pork (loin, round, extra lean hamburger)    ? Beans and peas, unsalted nuts, soy products    ? Egg whites and substitutes    ? Seeds and nuts    · Fats:      ? Unsaturated oil, such as canola, olive, peanut, soybean, or sunflower oil    ? Soft or liquid margarine and vegetable oil spread    ? Low-fat salad dressing    Other ways to decrease fat:   · Read food labels before you buy foods  Choose foods that have less than 30% of calories from fat  Choose low-fat or fat-free dairy products  Remember that fat free does not mean calorie free  These foods still contain calories, and too many calories can lead to weight gain  · Trim fat from meat and avoid fried food  Trim all visible fat from meat before you cook it  Remove the skin from poultry  Do not petit meat, fish, or poultry  Bake, roast, boil, or broil these foods instead  Avoid fried foods  Eat a baked potato instead of [place] Michelle fries  Steam vegetables instead of sautéing them in butter  · Add less fat to foods  Use imitation mueller bits on salads and baked potatoes instead of regular mueller bits  Use fat-free or low-fat salad dressings instead of regular dressings  Use low-fat or nonfat butter-flavored topping instead of regular butter or margarine on popcorn and other foods  Ways to decrease fat in recipes:  Replace high-fat ingredients with low-fat or nonfat ones  This may cause baked goods to be drier than usual  You may need to use nonfat cooking spray on pans to prevent food from sticking  You also may need to change the amount of other ingredients, such as water, in the recipe  Try the following:  · Use low-fat or light margarine instead of regular margarine or shortening       · Use lean ground turkey breast or chicken, or lean ground beef (less than 5% fat) instead of hamburger  · Add 1 teaspoon of canola oil to 8 ounces of skim milk instead of using cream or half and half  · Use grated zucchini, carrots, or apples in breads instead of coconut  · Use blenderized, low-fat cottage cheese, plain tofu, or low-fat ricotta cheese instead of cream cheese  · Use 1 egg white and 1 teaspoon of canola oil, or use ¼ cup (2 ounces) of fat-free egg substitute instead of a whole egg  · Replace half of the oil that is called for in a recipe with applesauce when you bake  Use 3 tablespoons of cocoa powder and 1 tablespoon of canola oil instead of a square of baking chocolate  How to increase fiber:  Eat enough high-fiber foods to get 20 to 30 grams of fiber every day  Slowly increase your fiber intake to avoid stomach cramps, gas, and other problems  · Eat 3 ounces of whole-grain foods each day  An ounce is about 1 slice of bread  Eat whole-grain breads, such as whole-wheat bread  Whole wheat, whole-wheat flour, or other whole grains should be listed as the first ingredient on the food label  Replace white flour with whole-grain flour or use half of each in recipes  Whole-grain flour is heavier than white flour, so you may have to add more yeast or baking powder  · Eat a high-fiber cereal for breakfast   Oatmeal is a good source of soluble fiber  Look for cereals that have bran or fiber in the name  Choose whole-grain products, such as brown rice, barley, and whole-wheat pasta  · Eat more beans, peas, and lentils  For example, add beans to soups or salads  Eat at least 5 cups of fruits and vegetables each day  Eat fruits and vegetables with the peel because the peel is high in fiber  © Copyright [employer] 2022 Information is for End User's use only and may not be sold, redistributed or otherwise used for commercial purposes   All illustrations and images included in CareNotes® are the copyrighted property of [employer]  or [employer]  The above information is an  only  It is not intended as medical advice for individual conditions or treatments  Talk to your doctor, nurse or pharmacist before following any medical regimen to see if it is safe and effective for you  Heart Healthy Diet   AMBULATORY CARE:   A heart healthy diet  is an eating plan low in unhealthy fats and sodium (salt)  The plan is high in healthy fats and fiber  A heart healthy diet helps improve your cholesterol levels and lowers your risk for heart disease and stroke  A dietitian will teach you how to read and understand food labels  Heart healthy diet guidelines to follow:   · Choose foods that contain healthy fats  ? Unsaturated fats  include monounsaturated and polyunsaturated fats  Unsaturated fat is found in foods such as soybean, canola, olive, corn, and safflower oils  It is also found in soft tub margarine that is made with liquid vegetable oil  ? Omega-3 fat  is found in certain fish, such as salmon, tuna, and trout, and in walnuts and flaxseed  Eat fish high in omega-3 fats at least 2 times a week  · Get 20 to 30 grams of fiber each day  Fruits, vegetables, whole-grain foods, and legumes (cooked beans) are good sources of fiber  · Limit or do not have unhealthy fats  ? Cholesterol  is found in animal foods, such as eggs and lobster, and in dairy products made from whole milk  Limit cholesterol to less than 200 mg each day  ? Saturated fat  is found in meats, such as mueller and hamburger  It is also found in chicken or turkey skin, whole milk, and butter  Limit saturated fat to less than 7% of your total daily calories  ? Trans fat  is found in packaged foods, such as potato chips and cookies  It is also in hard margarine, some fried foods, and shortening  Do not eat foods that contain trans fats  · Limit sodium as directed  You may be told to limit sodium to 2,000 to 2,300 mg each day   Choose low-sodium or no-salt-added foods  Add little or no salt to food you prepare  Use herbs and spices in place of salt  Include the following in your heart healthy plan:  Ask your dietitian or healthcare provider how many servings to have from each of the following food groups:  · Grains:      ? Whole-wheat breads, cereals, and pastas, and brown rice    ? Low-fat, low-sodium crackers and chips    · Vegetables:      ? Broccoli, green beans, green peas, and spinach    ? Collards, kale, and lima beans    ? Carrots, sweet potatoes, tomatoes, and peppers    ? Canned vegetables with no salt added    · Fruits:      ? Bananas, peaches, pears, and pineapple    ? Grapes, raisins, and dates    ? Oranges, tangerines, grapefruit, orange juice, and grapefruit juice    ? Apricots, mangoes, melons, and papaya    ? Raspberries and strawberries    ? Canned fruit with no added sugar    · Low-fat dairy:      ? Nonfat (skim) milk, 1% milk, and low-fat almond, cashew, or soy milks fortified with calcium    ? Low-fat cheese, regular or frozen yogurt, and cottage cheese    · Meats and proteins:      ? Lean cuts of beef and pork (loin, leg, round), skinless chicken and turkey    ? Legumes, soy products, egg whites, or nuts    Limit or do not include the following in your heart healthy plan:   · Unhealthy fats and oils:      ? Whole or 2% milk, cream cheese, sour cream, or cheese    ? High-fat cuts of beef (T-bone steaks, ribs), chicken or turkey with skin, and organ meats such as liver    ? Butter, stick margarine, shortening, and cooking oils such as coconut or palm oil    · Foods and liquids high in sodium:      ? Packaged foods, such as frozen dinners, cookies, macaroni and cheese, and cereals with more than 300 mg of sodium per serving    ? Vegetables with added sodium, such as instant potatoes, vegetables with added sauces, or regular canned vegetables    ? Cured or smoked meats, such as hot dogs, mueller, and sausage    ?  High-sodium ketchup, barbecue sauce, salad dressing, pickles, olives, soy sauce, or miso    · Foods and liquids high in sugar:      ? Candy, cake, cookies, pies, or doughnuts    ? Soft drinks (soda), sports drinks, or sweetened tea    ? Canned or dry mixes for cakes, soups, sauces, or gravies    Other healthy heart guidelines:   · Do not smoke  Nicotine and other chemicals in cigarettes and cigars can cause lung and heart damage  Ask your healthcare provider for information if you currently smoke and need help to quit  E-cigarettes or smokeless tobacco still contain nicotine  Talk to your healthcare provider before you use these products  · Limit or do not drink alcohol as directed  Alcohol can damage your heart and raise your blood pressure  Your healthcare provider may give you specific daily and weekly limits  The general recommended limit is 1 drink a day for women 21 or older and for men 72 or older  Do not have more than 3 drinks in a day or 7 in a week  The recommended limit is 2 drinks a day for men 24to 59years of age  Do not have more than 4 drinks in a day or 14 in a week  A drink of alcohol is 12 ounces of beer, 5 ounces of wine, or 1½ ounces of liquor  · Exercise regularly  Exercise can help you maintain a healthy weight and improve your blood pressure and cholesterol levels  Regular exercise can also decrease your risk for heart problems  Ask your healthcare provider about the best exercise plan for you  Do not start an exercise program without asking your healthcare provider  Follow up with your doctor or cardiologist as directed:  Write down your questions so you remember to ask them during your visits  © Copyright [employer] 2022 Information is for End User's use only and may not be sold, redistributed or otherwise used for commercial purposes   All illustrations and images included in CareNotes® are the copyrighted property of [employer]  or Renny Ovalle   The above information is an  only  It is not intended as medical advice for individual conditions or treatments  Talk to your doctor, nurse or pharmacist before following any medical regimen to see if it is safe and effective for you

## 2022-10-31 NOTE — PROGRESS NOTES
Name: Perez Lucas      : 1955      MRN: [id number]  Encounter Provider: HUSSEIN Lee  Encounter Date: 10/31/2022   Encounter department: [address] [place] [address]      {There are no diagnoses linked to this encounter  (Refresh or delete this SmartLink)}       Subjective      HPI  Review of Systems    Current Outpatient Medications on File Prior to Visit   Medication Sig   • acetaminophen (TYLENOL) 500 mg tablet Take 1,000 mg by mouth every 6 (six) hours as needed for mild pain    • allopurinol (ZYLOPRIM) 100 mg tablet TAKE 2 TABLETS DAILY EARLY IN THE MORNING   • ergocalciferol (ERGOCALCIFEROL) 1 25 MG (37749 UT) capsule Take 1 capsule (50,000 Units total) by mouth once a week   • lisinopril (ZESTRIL) 2 5 mg tablet TAKE 1 TABLET DAILY EARLY IN THE MORNING   • methocarbamol (ROBAXIN) 750 mg tablet Take 1 tablet (750 mg total) by mouth 2 (two) times a day as needed for muscle spasms (Patient taking differently: Take 750 mg by mouth 2 (two) times a day as needed for muscle spasms Not taking at this time)       Objective     There were no vitals taken for this visit      Physical Exam   [facility]

## 2022-11-01 ENCOUNTER — TELEPHONE (OUTPATIENT)
Dept: INTERNAL MEDICINE CLINIC | Facility: CLINIC | Age: 67
End: 2022-11-01

## 2022-11-01 DIAGNOSIS — E55.9 VITAMIN D DEFICIENCY: Primary | ICD-10-CM

## 2022-11-01 RX ORDER — ERGOCALCIFEROL 1.25 MG/1
50000 CAPSULE ORAL WEEKLY
Qty: 4 CAPSULE | Refills: 3 | Status: SHIPPED | OUTPATIENT
Start: 2022-11-01

## 2022-11-01 NOTE — TELEPHONE ENCOUNTER
Nano from [place] [id number] called asking if Mr Liberty Rasmussen was cleared for his upcoming [address]  She stated that was the reason he was seen yesterday  As per our conversation, I showed you the labs and EKG and you stated he was cleared for surgery  I did type the letter and faxed

## 2022-11-22 ENCOUNTER — PROCEDURE VISIT (OUTPATIENT)
Dept: UROLOGY | Facility: CLINIC | Age: 67
End: 2022-11-22

## 2022-11-22 VITALS
BODY MASS INDEX: 29.62 KG/M2 | DIASTOLIC BLOOD PRESSURE: 76 MMHG | WEIGHT: 200 LBS | HEIGHT: 69 IN | HEART RATE: 70 BPM | SYSTOLIC BLOOD PRESSURE: 120 MMHG

## 2022-11-22 DIAGNOSIS — C61 PROSTATE CANCER (HCC): Primary | ICD-10-CM

## 2022-11-22 LAB
SL AMB  POCT GLUCOSE, UA: NORMAL
SL AMB LEUKOCYTE ESTERASE,UA: NORMAL
SL AMB POCT BILIRUBIN,UA: NORMAL
SL AMB POCT BLOOD,UA: NORMAL
SL AMB POCT CLARITY,UA: CLEAR
SL AMB POCT COLOR,UA: YELLOW
SL AMB POCT KETONES,UA: NORMAL
SL AMB POCT NITRITE,UA: NORMAL
SL AMB POCT PH,UA: 5
SL AMB POCT SPECIFIC GRAVITY,UA: 1
SL AMB POCT URINE PROTEIN: NORMAL
SL AMB POCT UROBILINOGEN: 0.2

## 2022-11-22 RX ORDER — ENOXAPARIN SODIUM 100 MG/ML
INJECTION SUBCUTANEOUS
COMMUNITY
Start: 2022-11-12

## 2022-11-22 NOTE — PROGRESS NOTES
UROLOGY FOLLOW-UP NOTE     CHIEF COMPLAINT   Lissette Juarez is a 78 y o  male with a complaint of   Chief Complaint   Patient presents with   • Cystoscopy       History of Present Illness:     78 y o  male who developed gross hematuria and some voiding dysfunction in January of 2017  The patient has had several occurrences over the last almost 2 years  He thought that he was passing stones although he has never had a history of stone disease in the past  Patient finally sought care with a nephrologist who recommended evaluation in our office  Patient was able to obtain a CT urogram prior to his visit  He has never been a cigarette or cigar smoker and does not use smokeless tobacco   There is a cancer history in his family but not renal or bladder  Patient underwent a bladder tumor resection on the 11/14/2018  Pathology returned low-grade papillary urothelial carcinoma  Did develop post op urinary retention  Lab Results   Component Value Date    PSA <0 1 10/31/2022    PSA <0 1 07/02/2021    PSA <0 1 02/04/2021     Because of the PSA changes and some fullness felt on the right side of his prostate, the patient agreed to proceed with biopsy transperineal biopsy in early October 2019  Diagnosed with prostate cancer at that time  Patient now status post radical prostatectomy on 1/27/2020  Returns in followup of both bladder and prostate cancer  Patient has regained full continence  Working out 5 days a week  Very active  No concerns  Unfortunately, the patient lost his oldest son to an ATV accident prior to our last visit  Returns for cystoscopic surveillance      Past Medical History:     Past Medical History:   Diagnosis Date   • Bladder cancer ([facility] [address] )     2018- surgery/ mitomycin   • Chronic pain disorder     low back   • Degenerative joint disease involving multiple joints    • Gout    • Herniated lumbar intervertebral disc    • Hypertension    • Prostate [facility])    • Tinnitus    • Wears glasses    • Wears partial dentures     uppers       PAST SURGICAL HISTORY:     Past Surgical History:   Procedure Laterality Date   • CYSTOSCOPY  02/27/2019   • CYSTOSCOPY  05/22/2020   • CYSTOSCOPY  11/22/2022    Leah   • VT BIOPSY OF PROSTATE,NEEDLE/PUNCH N/A 10/15/2019    Procedure: Gia Able NEEDLE BIOPSY PROSTATE, CYSTOSCOPY;  Surgeon: Praveen Coughlin MD;  [facility];  Service: Urology   • VT CYSTOURETHROSCOPY,FULGUR <0 5 CM LESN N/A 11/14/2018    Procedure: TRANSURETHRAL RESECTION OF BLADDER TUMOR (TURBT); Surgeon: Praveen Coughlin MD;  Location: [facility];  Service: Urology   • VT INSTILL ANTICANCER AGENT IN BLADDER N/A 11/14/2018    Procedure: Shelton Velascoamity;  Surgeon: Praveen Coughlin MD;  [facility];  Service: Urology   • VT LAP,PROSTATECTOMY,RADICAL,W/NERVE SPARE,INCL ROBOTIC N/A 01/27/2020    Procedure: PROSTATECTOMY RADICAL W ROBOTICS; Bilateral Pelvic Lymph Node Dissection;  Surgeon: Praveen Coughlin MD;  [facility];  Service: Urology   • TONSILLECTOMY     • WISDOM TOOTH EXTRACTION         CURRENT MEDICATIONS:     Current Outpatient Medications   Medication Sig Dispense Refill   • allopurinol (ZYLOPRIM) 100 mg tablet TAKE 2 TABLETS DAILY EARLY IN THE MORNING 180 tablet 3   • enoxaparin (LOVENOX) 40 mg/0 4 mL      • ergocalciferol (VITAMIN D2) 50,000 units Take 1 capsule (50,000 Units total) by mouth once a week 4 capsule 3   • lisinopril (ZESTRIL) 2 5 mg tablet TAKE 1 TABLET DAILY EARLY IN THE MORNING 90 tablet 3     No current facility-administered medications for this visit  ALLERGIES:     Allergies   Allergen Reactions   • Nsaids Tinnitus     AVOIDS due to tinnitus       SOCIAL HISTORY:     Social History     Socioeconomic History   • Marital status:       Spouse name: None   • Number of children: None   • Years of education: None   • Highest education level: None   Occupational History   • None   Tobacco Use   • Smoking status: Never   • Smokeless tobacco: Never   Vaping Use   • Vaping Use: Never used   Substance and Sexual Activity   • Alcohol use: Yes     Comment: liquor   • Drug use: Never   • Sexual activity: None   Other Topics Concern   • None   Social History Narrative    Does not consume caffeine      Social Determinants of Health     Financial Resource Strain: Low Risk    • Difficulty of Paying Living Expenses: Not hard at all   Food Insecurity: Not on file   Transportation Needs: No Transportation Needs   • Lack of Transportation (Medical): No   • Lack of Transportation (Non-Medical): No   Physical Activity: Not on file   Stress: Not on file   Social Connections: Not on file   Intimate Partner Violence: Not on file   Housing Stability: Not on file       SOCIAL HISTORY:     Family History   Problem Relation Age of Onset   • Heart attack Father    • Throat cancer Father    • Cancer Father         throat cancer   • Heart disease Mother        REVIEW OF SYSTEMS:     Review of Systems   Constitutional: Negative  Respiratory: Negative  Cardiovascular: Negative  Gastrointestinal: Negative  Genitourinary: Negative for enuresis  Musculoskeletal: Negative  Skin: Negative  Psychiatric/Behavioral: Negative  PHYSICAL EXAM:     /76 (BP Location: Left arm, Patient Position: Sitting, Cuff Size: Adult)   Pulse 70   Ht 5' 9" (1 753 m)   Wt 90 7 kg (200 lb)   BMI 29 53 kg/m²     General:  Healthy appearing male in no acute distress  They have a normal affect  There is not appear to be any gross neurologic defects or abnormalities  HEENT:  Normocephalic, atraumatic  Neck is supple without any palpable lymphadenopathy  Cardiovascular:  Patient has normal palpable distal radial pulses  There is no significant peripheral edema  No JVD is noted  Respiratory:  Patient has unlabored respirations  There is no audible wheeze or rhonchi  Abdomen:  Abdomen is with healed surgical scars    Abdomen is soft and nontender  There is no tympany  Inguinal and umbilical hernia are not appreciated  Genitourinary: no penile lesions or discharge, no testicular masses or tenderness, no hernias    Musculoskeletal:  Patient does not have significant CVA tenderness in the  flank with palpation or percussion  They full range of motion in all 4 extremities  Strength in all 4 extremities appears congruent  Patient is able to ambulate without assistance or difficulty  Dermatologic:  Patient has no skin abnormalities or rashes  LABS:     CBC:   Lab Results   Component Value Date    WBC 7 78 2021    HGB 15 2 2021    HCT 44 9 2021    MCV 96 2021     2021       BMP:   Lab Results   Component Value Date    CALCIUM 8 4 2021    K 3 9 2021    CO2 26 2021     2021    BUN 17 2021    CREATININE 1 08 2021     Lab Results   Component Value Date    PSA <0 1 10/31/2022    PSA <0 1 2021    PSA <0 1 2021     IMAGIN/19/19  RENAL ULTRASOUND     INDICATION:   R33 8: Other retention of urine  History of prior bladder cancer surgery     COMPARISON: CT renal protocol 2018     TECHNIQUE:   Ultrasound of the retroperitoneum was performed with a curvilinear transducer utilizing volumetric sweeps and still imaging techniques       FINDINGS:     KIDNEYS:  Symmetric and normal size  Right kidney:  11 1 x 5 1 cm  Left kidney:  10 8 x 6 8 cm      Right kidney  Normal echogenicity and contour  No suspicious masses detected  No hydronephrosis  No shadowing calculi  No perinephric fluid collections      Left kidney  Normal echogenicity and contour  No suspicious masses detected  No hydronephrosis  No shadowing calculi  No perinephric fluid collections      URETERS:  Nonvisualized      BLADDER:   Normally distended  No focal thickening or mass lesions    Bilateral ureteral jets detected         IMPRESSION:     Normal     9/12/18  CT ABDOMEN AND PELVIS WITH AND WITHOUT IV CONTRAST     INDICATION:   R31 0: Gross hematuria      COMPARISON:  CT scan 7/10/2018      TECHNIQUE: Initial CT of the abdomen and pelvis was performed without intravenous contrast   Subsequent dynamic CT evaluation of the abdomen and pelvis was performed after the administration of intravenous contrast in both nephrographic and delayed   phases after the administration of intravenous contrast   Axial, sagittal, and coronal 2D reformatted images were created from the source data and submitted for interpretation       Radiation dose length product (DLP) for this visit:  1852 mGy-cm   This examination, like all CT scans performed in the [facility], was performed utilizing techniques to minimize radiation dose exposure, including the use of iterative   reconstruction and automated exposure control      IV Contrast:  100 mL of iohexol (OMNIPAQUE)  Enteric Contrast:  Enteric contrast was not administered      FINDINGS:     ABDOMEN     RIGHT KIDNEY AND URETER:  No solid renal mass  No detectable urothelial mass  No hydronephrosis or hydroureter  No urinary tract calculi  No perinephric collection      LEFT KIDNEY AND URETER:  No solid renal mass  No detectable urothelial mass  No hydronephrosis or hydroureter  No urinary tract calculi  No perinephric collection      URINARY BLADDER:  There is a 3 3 cm soft tissue mass in the left posterior lateral aspect of the bladder, adjacent to the ureteral orifice  No calculi         LOWER CHEST:  No clinically significant abnormality identified in the visualized lower chest      LIVER/BILIARY TREE:  Unremarkable      GALLBLADDER:  No calcified gallstones   No pericholecystic inflammatory change      SPLEEN:  Unremarkable      PANCREAS:  Unremarkable      ADRENAL GLANDS:  Unremarkable      STOMACH AND BOWEL:  There is colonic diverticulosis without evidence of acute diverticulitis      ABDOMINOPELVIC CAVITY:  No ascites  No free intraperitoneal air  No lymphadenopathy      VESSELS:  Unremarkable for patient's age      PELVIS     REPRODUCTIVE ORGANS:  Unremarkable for patient's age      APPENDIX: No findings to suggest appendicitis      ABDOMINAL WALL/INGUINAL REGIONS:  There is a small fat-containing umbilical hernia      OSSEOUS STRUCTURES:  No acute fracture or destructive osseous lesion      IMPRESSION:     3 3 cm mass in the left posterior lateral aspect of the urinary bladder  No evidence of metastatic disease      Colonic diverticulosis  PATHOLOGY:     1/27/20  Final Diagnosis   A  Prostate gland:  - Adenocarcinoma, acinar type  - Megan score 3+4, grade group 2  - Per cent pattern 4 is approximately 15%  - No tumor seen at margins of resection  - Tumor appears confined to the right half of the prostate and comprises approximately 20% of the prostatic tissue  - Perineural invasion is identified  - See synoptic report for further details     B  Radha prostatic fat:    - No lymph nodes are identified and no malignancy is seen     C  Right pelvic lymph nodes:  - Five lymph nodes are identified showing no metastatic tumor     D  Left pelvic lymph nodes:  - Fatty tissue is seen with no lymph node and no malignancy identified     Note: Block A19 can be used if ancillary testing is requested  10/15/19  Final Diagnosis   A  Prostate, right posterior medial, needle biopsy:  -  Prostatic adenocarcinoma, acinar, not otherwise specified; Garland grade 3 +4= score of 7 (grade group 2) in 2 of 2 cores involving 60% of needle core tissue and measuring up to 7 mm in contiguous length (score 4=10%)  -  Periprostatic fat invasion: Not identified   -  Seminal vesicle invasion: Not identified  -  Lymph-vascular invasion: Not identified   -  Perineural invasion: Not identified   -  Additional Pathologic Findings:  None      B    Positive, right posterior lateral, needle biopsy:  -  Prostatic adenocarcinoma, acinar, not otherwise specified; Megan grade 3 +4= score of 7 (grade group 2) in 2 of 2 cores involving 90% of needle core tissue and measuring up to 9 mm in contiguous length (score 4=20-30%)  -  Periprostatic fat invasion: Not identified   -  Seminal vesicle invasion: Not identified  -  Lymph-vascular invasion: Not identified   -  Perineural invasion: Present   -  Additional Pathologic Findings:  None      C  Prostate, right anterior lateral, needle biopsy:  -  Prostatic adenocarcinoma, acinar, not otherwise specified; Ashaway grade 3 +4= score of 7 (grade group 2) in 1 of 2 cores involving 40-50% of needle core tissue and measuring up to 9 mm in contiguous length (score 4=10-20%)  -  Periprostatic fat invasion: Not identified   -  Seminal vesicle invasion: Not identified  -  Lymph-vascular invasion: Not identified   -  Perineural invasion: Not identified   -  Additional Pathologic Findings:  None      D  Prostate, right anterior medial, needle biopsy:  -  Benign prostate tissue, negative for malignancy      E  Prostate, right base, needle biopsy:  -  Focal atypical small acinar proliferation, favor prostatic adenocarcinoma, focus shows crush artifact and is  too small to be further differentiated and appropriately graded      F  Prostate, left posterior medial, needle biopsy:  -  Benign prostate tissue, negative for malignancy      G  Prostate, left posterior lateral, needle biopsy:  -  Benign prostate tissue, negative for malignancy      H  Prostate, left anterior lateral, needle biopsy:  -  Benign prostate tissue, negative for malignancy      I  Prostate, left anterior medial, needle biopsy:  -  Benign prostate tissue, negative for malignancy      J   Prostate, left base, needle biopsy:  -  Benign prostate tissue, negative for malignancy  11/14/18  Final Diagnosis   A  Urinary bladder, excision:             - Noninvasive low-grade papillary urothelial carcinoma               - Uninvolved muscularis propria is identified  PROCEDURE:     SEE NOTE    ASSESSMENT:     78 y o  male with LGTa Bladder Cancer, cT1c Megan 3+4=7 prostate cancer now s/p RALP/PLND on 1/27/20 for [id number]    PLAN:     1  h/o LG Ta UC bladder, diagnosis 11/2018 -  cystoscopy in 1 year    2   Prostate cancer -  PSA yearly

## 2022-11-25 NOTE — PROGRESS NOTES
Cystoscopy     Date/Time 11/25/2022 1:31 PM     Performed by  Eden Essex, MD     Authorized by Eden Essex, MD      Universal Protocol:  Timeout called at: 11/25/2022 1:31 PM       Procedure Details:  Procedure type: cystoscopy    Patient tolerance: Patient tolerated the procedure well with no immediate complications    Additional Procedure Details:   Cystoscopy procedure note:    Risk and benefits of flexible cystoscopy were discussed  Informed consent was obtained  Urine dip was adequate for cystoscopy  The patient was placed in the supine position  His genitalia was prepped with Betadine and draped in a sterile fashion  Viscous 2% lidocaine jelly was instilled into the urethra and allowed dwell time for local anesthesia  Flexible cystoscopy was then performed using a 16F scope  The distal urethra was evaluated and was normal in course and caliber, prostate surgically absent  Once inside the bladder, it was carefully inspected in a 360 degree fashion  There was no evidence of mucosal abnormalities, lesions, diverticula or stones  Both ureteral orifices in their orthotopic location were visualized with clear efflux of urine  Retroflexion for evaluation of the bladder neck was normal      Overall this was a negative cystoscopy

## 2023-02-02 DIAGNOSIS — E79.0 HYPERURICEMIA: ICD-10-CM

## 2023-02-21 DIAGNOSIS — E79.0 HYPERURICEMIA: ICD-10-CM

## 2023-02-22 ENCOUNTER — TELEPHONE (OUTPATIENT)
Dept: INTERNAL MEDICINE CLINIC | Facility: CLINIC | Age: 68
End: 2023-02-22

## 2023-03-01 RX ORDER — ALLOPURINOL 100 MG/1
TABLET ORAL
Qty: 180 TABLET | Refills: 3 | Status: SHIPPED | OUTPATIENT
Start: 2023-03-01

## 2023-03-15 DIAGNOSIS — E55.9 VITAMIN D DEFICIENCY: ICD-10-CM

## 2023-03-16 RX ORDER — ERGOCALCIFEROL 1.25 MG/1
CAPSULE ORAL
Qty: 4 CAPSULE | Refills: 0 | Status: SHIPPED | OUTPATIENT
Start: 2023-03-16

## 2023-04-18 RX ORDER — ALLOPURINOL 100 MG/1
TABLET ORAL
Qty: 180 TABLET | Refills: 0 | Status: CANCELLED | OUTPATIENT
Start: 2023-04-18

## 2023-05-01 ENCOUNTER — APPOINTMENT (OUTPATIENT)
Dept: LAB | Facility: CLINIC | Age: 68
End: 2023-05-01

## 2023-05-01 ENCOUNTER — OFFICE VISIT (OUTPATIENT)
Dept: INTERNAL MEDICINE CLINIC | Facility: CLINIC | Age: 68
End: 2023-05-01

## 2023-05-01 VITALS
OXYGEN SATURATION: 98 % | BODY MASS INDEX: 32.01 KG/M2 | HEIGHT: 69 IN | WEIGHT: 216.1 LBS | SYSTOLIC BLOOD PRESSURE: 142 MMHG | DIASTOLIC BLOOD PRESSURE: 84 MMHG | HEART RATE: 76 BPM

## 2023-05-01 DIAGNOSIS — N18.2 STAGE 2 CHRONIC KIDNEY DISEASE: ICD-10-CM

## 2023-05-01 DIAGNOSIS — C18.7 MALIGNANT NEOPLASM OF SIGMOID COLON (HCC): ICD-10-CM

## 2023-05-01 DIAGNOSIS — E55.9 VITAMIN D DEFICIENCY: ICD-10-CM

## 2023-05-01 DIAGNOSIS — C67.0 MALIGNANT NEOPLASM OF TRIGONE OF URINARY BLADDER (HCC): Primary | ICD-10-CM

## 2023-05-01 DIAGNOSIS — C67.0 MALIGNANT NEOPLASM OF TRIGONE OF URINARY BLADDER (HCC): ICD-10-CM

## 2023-05-01 DIAGNOSIS — M62.838 MUSCLE SPASM: ICD-10-CM

## 2023-05-01 DIAGNOSIS — I10 ESSENTIAL HYPERTENSION: ICD-10-CM

## 2023-05-01 DIAGNOSIS — E79.0 HYPERURICEMIA: ICD-10-CM

## 2023-05-01 LAB
25(OH)D3 SERPL-MCNC: 80.8 NG/ML (ref 30–100)
CHOLEST SERPL-MCNC: 131 MG/DL
HDLC SERPL-MCNC: 41 MG/DL
LDLC SERPL CALC-MCNC: 68 MG/DL (ref 0–100)
NONHDLC SERPL-MCNC: 90 MG/DL
TRIGL SERPL-MCNC: 112 MG/DL
TSH SERPL DL<=0.05 MIU/L-ACNC: 2.23 UIU/ML (ref 0.45–4.5)

## 2023-05-01 RX ORDER — CYCLOBENZAPRINE HCL 10 MG
10 TABLET ORAL
Qty: 30 TABLET | Refills: 0 | Status: SHIPPED | OUTPATIENT
Start: 2023-05-01

## 2023-05-01 RX ORDER — LISINOPRIL 2.5 MG/1
2.5 TABLET ORAL
Qty: 90 TABLET | Refills: 3 | Status: CANCELLED | OUTPATIENT
Start: 2023-05-01

## 2023-05-01 NOTE — PROGRESS NOTES
Name: Lizy Teran      : 1955      MRN: [id number]  Encounter Provider: HUSSEIN Byrd  Encounter Date: 2023   Encounter department: [address] [place] [address] [address] Will repeat fasting labs  Continues to see Hematology and Urology  Will call in a short course of Flexeril for his back discomfort  Will bring back in November for his medicare wellness  1  Malignant neoplasm of trigone of urinary bladder (HCC)  -     Lipid panel; Future  -     TSH, 3rd generation with Free T4 reflex; Future    2  Stage 2 chronic kidney disease  -     Lipid panel; Future  -     TSH, 3rd generation with Free T4 reflex; Future    3  Essential hypertension  -     Lipid panel; Future  -     TSH, 3rd generation with Free T4 reflex; Future    4  Malignant neoplasm of sigmoid colon ([address] )  -     Lipid panel; Future  -     TSH, 3rd generation with Free T4 reflex; Future    5  Vitamin D deficiency  -     Vitamin D 25 hydroxy; Future  -     Lipid panel; Future  -     TSH, 3rd generation with Free T4 reflex; Future           Subjective      Nubia Eisenberg is for a follow up visit  He is seeing Hematology for an adenocarcinoma  He is doing well and did have recent labs which were normal  He does see Urology again in November and is hoping he will be cleared for no repeat scopes  He denies any chest pain, SOB, or palpitations  He is having some issues with sleep due to his back  He does have herniated discs and sometimes wakes up with a lot of back pain  He is willing to try something for the pain  He did work swing shift and does have a hard time with sleep and seems to wake up every night around 130  He is up to date on his other screenings  He offers no other issues  Review of Systems   Musculoskeletal: Positive for arthralgias, back pain and myalgias  All other systems reviewed and are negative        Current Outpatient Medications on File Prior to Visit   Medication Sig    allopurinol "(ZYLOPRIM) 100 mg tablet TAKE 2 TABLETS DAILY EARLY IN THE MORNING    ergocalciferol (VITAMIN D2) 50,000 units take 1 capsule by mouth weekly    lisinopril (ZESTRIL) 2 5 mg tablet TAKE 1 TABLET DAILY EARLY IN THE MORNING    [DISCONTINUED] enoxaparin (LOVENOX) 40 mg/0 4 mL        Objective     /84   Pulse 76   Ht 5' 9\" (1 753 m)   Wt 98 kg (216 lb 1 6 oz)   SpO2 98%   BMI 31 91 kg/m²     Physical Exam  Vitals reviewed  Constitutional:       Appearance: Normal appearance  He is normal weight  HENT:      Head: Normocephalic and atraumatic  Right Ear: Tympanic membrane, ear canal and external ear normal       Left Ear: Tympanic membrane, ear canal and external ear normal       Nose: Nose normal       Mouth/Throat:      Mouth: Mucous membranes are moist       Pharynx: Oropharynx is clear  Eyes:      Extraocular Movements: Extraocular movements intact  Conjunctiva/sclera: Conjunctivae normal       Pupils: Pupils are equal, round, and reactive to light  Cardiovascular:      Rate and Rhythm: Normal rate and regular rhythm  Pulses: Normal pulses  Heart sounds: Normal heart sounds  Pulmonary:      Effort: Pulmonary effort is normal       Breath sounds: Normal breath sounds  Abdominal:      General: Abdomen is flat  Bowel sounds are normal       Palpations: Abdomen is soft  Musculoskeletal:         General: Normal range of motion  Cervical back: Normal range of motion and neck supple  Skin:     General: Skin is warm and dry  Capillary Refill: Capillary refill takes less than 2 seconds  Neurological:      General: No focal deficit present  Mental Status: He is alert and oriented to person, place, and time  Mental status is at baseline  Psychiatric:         Mood and Affect: Mood normal          Behavior: Behavior normal          Thought Content:  Thought content normal          Judgment: Judgment normal        HUSSEIN Ivory  "

## 2023-05-05 DIAGNOSIS — I10 ESSENTIAL HYPERTENSION: ICD-10-CM

## 2023-05-05 RX ORDER — LISINOPRIL 2.5 MG/1
2.5 TABLET ORAL DAILY
Qty: 90 TABLET | Refills: 3 | Status: SHIPPED | OUTPATIENT
Start: 2023-05-05

## 2023-05-19 ENCOUNTER — TELEPHONE (OUTPATIENT)
Dept: INTERNAL MEDICINE CLINIC | Facility: CLINIC | Age: 68
End: 2023-05-19

## 2023-05-19 DIAGNOSIS — E55.9 VITAMIN D DEFICIENCY: ICD-10-CM

## 2023-05-19 RX ORDER — ERGOCALCIFEROL 1.25 MG/1
CAPSULE ORAL
Qty: 4 CAPSULE | Refills: 0 | Status: SHIPPED | OUTPATIENT
Start: 2023-05-19

## 2023-05-19 NOTE — TELEPHONE ENCOUNTER
Would like to know if you are Sukhdeep Meltony keep him on vit d long term if so please send to [facility]

## 2023-06-17 DIAGNOSIS — E55.9 VITAMIN D DEFICIENCY: ICD-10-CM

## 2023-06-17 RX ORDER — ERGOCALCIFEROL 1.25 MG/1
CAPSULE ORAL
Qty: 4 CAPSULE | Refills: 0 | Status: SHIPPED | OUTPATIENT
Start: 2023-06-17

## 2023-07-15 DIAGNOSIS — E55.9 VITAMIN D DEFICIENCY: ICD-10-CM

## 2023-07-17 RX ORDER — ERGOCALCIFEROL 1.25 MG/1
CAPSULE ORAL
Qty: 4 CAPSULE | Refills: 0 | Status: SHIPPED | OUTPATIENT
Start: 2023-07-17

## 2023-08-13 DIAGNOSIS — E55.9 VITAMIN D DEFICIENCY: ICD-10-CM

## 2023-08-13 RX ORDER — ERGOCALCIFEROL 1.25 MG/1
CAPSULE ORAL
Qty: 4 CAPSULE | Refills: 0 | Status: SHIPPED | OUTPATIENT
Start: 2023-08-13

## 2023-09-10 DIAGNOSIS — E55.9 VITAMIN D DEFICIENCY: ICD-10-CM

## 2023-09-11 ENCOUNTER — TELEPHONE (OUTPATIENT)
Age: 68
End: 2023-09-11

## 2023-09-11 DIAGNOSIS — C61 PROSTATE CANCER (HCC): Primary | ICD-10-CM

## 2023-09-11 NOTE — TELEPHONE ENCOUNTER
Shawn Zamorano and made him aware PSA order was placed for him to have completed prior to appointment with Dr. Isacc Camacho.

## 2023-09-12 RX ORDER — ERGOCALCIFEROL 1.25 MG/1
CAPSULE ORAL
Qty: 4 CAPSULE | Refills: 0 | Status: SHIPPED | OUTPATIENT
Start: 2023-09-12

## 2023-10-07 DIAGNOSIS — E55.9 VITAMIN D DEFICIENCY: ICD-10-CM

## 2023-10-09 RX ORDER — ERGOCALCIFEROL 1.25 MG/1
CAPSULE ORAL
Qty: 4 CAPSULE | Refills: 0 | Status: SHIPPED | OUTPATIENT
Start: 2023-10-09

## 2023-11-02 ENCOUNTER — APPOINTMENT (OUTPATIENT)
Dept: LAB | Facility: CLINIC | Age: 68
End: 2023-11-02
Payer: COMMERCIAL

## 2023-11-02 ENCOUNTER — OFFICE VISIT (OUTPATIENT)
Dept: INTERNAL MEDICINE CLINIC | Facility: CLINIC | Age: 68
End: 2023-11-02
Payer: COMMERCIAL

## 2023-11-02 VITALS
HEART RATE: 60 BPM | HEIGHT: 69 IN | WEIGHT: 215.6 LBS | SYSTOLIC BLOOD PRESSURE: 140 MMHG | TEMPERATURE: 98.4 F | OXYGEN SATURATION: 98 % | BODY MASS INDEX: 31.93 KG/M2 | DIASTOLIC BLOOD PRESSURE: 66 MMHG

## 2023-11-02 DIAGNOSIS — I10 ESSENTIAL HYPERTENSION: ICD-10-CM

## 2023-11-02 DIAGNOSIS — C61 PROSTATE CANCER (HCC): ICD-10-CM

## 2023-11-02 DIAGNOSIS — Z00.00 MEDICARE ANNUAL WELLNESS VISIT, SUBSEQUENT: Primary | ICD-10-CM

## 2023-11-02 DIAGNOSIS — C18.7 MALIGNANT NEOPLASM OF SIGMOID COLON (HCC): ICD-10-CM

## 2023-11-02 DIAGNOSIS — Z23 ENCOUNTER FOR IMMUNIZATION: ICD-10-CM

## 2023-11-02 DIAGNOSIS — E55.9 VITAMIN D DEFICIENCY: ICD-10-CM

## 2023-11-02 DIAGNOSIS — E78.00 PURE HYPERCHOLESTEROLEMIA: ICD-10-CM

## 2023-11-02 LAB
25(OH)D3 SERPL-MCNC: 62.9 NG/ML (ref 30–100)
PSA SERPL-MCNC: <0.01 NG/ML (ref 0–4)

## 2023-11-02 PROCEDURE — 82306 VITAMIN D 25 HYDROXY: CPT

## 2023-11-02 PROCEDURE — 90662 IIV NO PRSV INCREASED AG IM: CPT | Performed by: NURSE PRACTITIONER

## 2023-11-02 PROCEDURE — G0008 ADMIN INFLUENZA VIRUS VAC: HCPCS | Performed by: NURSE PRACTITIONER

## 2023-11-02 PROCEDURE — 99214 OFFICE O/P EST MOD 30 MIN: CPT | Performed by: NURSE PRACTITIONER

## 2023-11-02 PROCEDURE — G0439 PPPS, SUBSEQ VISIT: HCPCS | Performed by: NURSE PRACTITIONER

## 2023-11-02 RX ORDER — POLYETHYLENE GLYCOL 3350, SODIUM SULFATE, SODIUM CHLORIDE, POTASSIUM CHLORIDE, ASCORBIC ACID, SODIUM ASCORBATE 140-9-5.2G
KIT ORAL
COMMUNITY
Start: 2023-10-03

## 2023-11-02 NOTE — PATIENT INSTRUCTIONS
Medicare Preventive Visit Patient Instructions  Thank you for completing your Welcome to Medicare Visit or Medicare Annual Wellness Visit today. Your next wellness visit will be due in one year (11/2/2024). The screening/preventive services that you may require over the next 5-10 years are detailed below. Some tests may not apply to you based off risk factors and/or age. Screening tests ordered at today's visit but not completed yet may show as past due. Also, please note that scanned in results may not display below. Preventive Screenings:  Service Recommendations Previous Testing/Comments   Colorectal Cancer Screening  Colonoscopy    Fecal Occult Blood Test (FOBT)/Fecal Immunochemical Test (FIT)  Fecal DNA/Cologuard Test  Flexible Sigmoidoscopy Age: 43-73 years old   Colonoscopy: every 10 years (May be performed more frequently if at higher risk)  OR  FOBT/FIT: every 1 year  OR  Cologuard: every 3 years  OR  Sigmoidoscopy: every 5 years  Screening may be recommended earlier than age 39 if at higher risk for colorectal cancer. Also, an individualized decision between you and your healthcare provider will decide whether screening between the ages of 77-80 would be appropriate.  Colonoscopy: 09/21/2022  FOBT/FIT: Not on file  Cologuard: Not on file  Sigmoidoscopy: Not on file    History Colorectal Cancer     Prostate Cancer Screening Individualized decision between patient and health care provider in men between ages of 53-66   Medicare will cover every 12 months beginning on the day after your 50th birthday PSA: <0.1 ng/mL     History Prostate Cancer     Hepatitis C Screening Once for adults born between 1945 and 1965  More frequently in patients at high risk for Hepatitis C Hep C Antibody: Not on file    Screening Current   Diabetes Screening 1-2 times per year if you're at risk for diabetes or have pre-diabetes Fasting glucose: 87 mg/dL (9/17/2021)  A1C: No results in last 5 years (No results in last 5 years) Cholesterol Screening Once every 5 years if you don't have a lipid disorder. May order more often based on risk factors. Lipid panel: 05/01/2023  Screening Not Indicated  History Lipid Disorder      Other Preventive Screenings Covered by Medicare:  Abdominal Aortic Aneurysm (AAA) Screening: covered once if your at risk. You're considered to be at risk if you have a family history of AAA or a male between the age of 70-76 who smoking at least 100 cigarettes in your lifetime. Lung Cancer Screening: covers low dose CT scan once per year if you meet all of the following conditions: (1) Age 48-67; (2) No signs or symptoms of lung cancer; (3) Current smoker or have quit smoking within the last 15 years; (4) You have a tobacco smoking history of at least 20 pack years (packs per day x number of years you smoked); (5) You get a written order from a healthcare provider. Glaucoma Screening: covered annually if you're considered high risk: (1) You have diabetes OR (2) Family history of glaucoma OR (3)  aged 48 and older OR (3)  American aged 72 and older  Osteoporosis Screening: covered every 2 years if you meet one of the following conditions: (1) Have a vertebral abnormality; (2) On glucocorticoid therapy for more than 3 months; (3) Have primary hyperparathyroidism; (4) On osteoporosis medications and need to assess response to drug therapy. HIV Screening: covered annually if you're between the age of 14-79. Also covered annually if you are younger than 13 and older than 72 with risk factors for HIV infection. For pregnant patients, it is covered up to 3 times per pregnancy.     Immunizations:  Immunization Recommendations   Influenza Vaccine Annual influenza vaccination during flu season is recommended for all persons aged >= 6 months who do not have contraindications   Pneumococcal Vaccine   * Pneumococcal conjugate vaccine = PCV13 (Prevnar 13), PCV15 (Vaxneuvance), PCV20 (Prevnar 20)  * Pneumococcal polysaccharide vaccine = PPSV23 (Pneumovax) Adults 24-15 yo with certain risk factors or if 69+ yo  If never received any pneumonia vaccine: recommend Prevnar 20 (PCV20)  Give PCV20 if previously received 1 dose of PCV13 or PPSV23   Hepatitis B Vaccine 3 dose series if at intermediate or high risk (ex: diabetes, end stage renal disease, liver disease)   Respiratory syncytial virus (RSV) Vaccine - COVERED BY MEDICARE PART D  * RSVPreF3 (Arexvy) CDC recommends that adults 61years of age and older may receive a single dose of RSV vaccine using shared clinical decision-making (SCDM)   Tetanus (Td) Vaccine - COST NOT COVERED BY MEDICARE PART B Following completion of primary series, a booster dose should be given every 10 years to maintain immunity against tetanus. Td may also be given as tetanus wound prophylaxis. Tdap Vaccine - COST NOT COVERED BY MEDICARE PART B Recommended at least once for all adults. For pregnant patients, recommended with each pregnancy. Shingles Vaccine (Shingrix) - COST NOT COVERED BY MEDICARE PART B  2 shot series recommended in those 19 years and older who have or will have weakened immune systems or those 50 years and older     Health Maintenance Due:      Topic Date Due   • Hepatitis C Screening  11/30/2024 (Originally 1955)     Immunizations Due:      Topic Date Due   • COVID-19 Vaccine (5 - Pfizer series) 06/04/2022   • Influenza Vaccine (1) 09/01/2023     Advance Directives   What are advance directives? Advance directives are legal documents that state your wishes and plans for medical care. These plans are made ahead of time in case you lose your ability to make decisions for yourself. Advance directives can apply to any medical decision, such as the treatments you want, and if you want to donate organs. What are the types of advance directives? There are many types of advance directives, and each state has rules about how to use them.  You may choose a combination of any of the following:  Living will: This is a written record of the treatment you want. You can also choose which treatments you do not want, which to limit, and which to stop at a certain time. This includes surgery, medicine, IV fluid, and tube feedings. Durable power of  for healthcare [facility]): This is a written record that states who you want to make healthcare choices for you when you are unable to make them for yourself. This person, called a proxy, is usually a family member or a friend. You may choose more than 1 proxy. Do not resuscitate (DNR) order:  A DNR order is used in case your heart stops beating or you stop breathing. It is a request not to have certain forms of treatment, such as CPR. A DNR order may be included in other types of advance directives. Medical directive: This covers the care that you want if you are in a coma, near death, or unable to make decisions for yourself. You can list the treatments you want for each condition. Treatment may include pain medicine, surgery, blood transfusions, dialysis, IV or tube feedings, and a ventilator (breathing machine). Values history: This document has questions about your views, beliefs, and how you feel and think about life. This information can help others choose the care that you would choose. Why are advance directives important? An advance directive helps you control your care. Although spoken wishes may be used, it is better to have your wishes written down. Spoken wishes can be misunderstood, or not followed. Treatments may be given even if you do not want them. An advance directive may make it easier for your family to make difficult choices about your care. Weight Management   Why it is important to manage your weight:  Being overweight increases your risk of health conditions such as heart disease, high blood pressure, type 2 diabetes, and certain types of cancer.  It can also increase your risk for osteoarthritis, sleep apnea, and other respiratory problems. Aim for a slow, steady weight loss. Even a small amount of weight loss can lower your risk of health problems. How to lose weight safely:  A safe and healthy way to lose weight is to eat fewer calories and get regular exercise. You can lose up about 1 pound a week by decreasing the number of calories you eat by 500 calories each day. Healthy meal plan for weight management:  A healthy meal plan includes a variety of foods, contains fewer calories, and helps you stay healthy. A healthy meal plan includes the following:  Eat whole-grain foods more often. A healthy meal plan should contain fiber. Fiber is the part of grains, fruits, and vegetables that is not broken down by your body. Whole-grain foods are healthy and provide extra fiber in your diet. Some examples of whole-grain foods are whole-wheat breads and pastas, oatmeal, brown rice, and bulgur. Eat a variety of vegetables every day. Include dark, leafy greens such as spinach, kale, jordon greens, and mustard greens. Eat yellow and orange vegetables such as carrots, sweet potatoes, and winter squash. Eat a variety of fruits every day. Choose fresh or canned fruit (canned in its own juice or light syrup) instead of juice. Fruit juice has very little or no fiber. Eat low-fat dairy foods. Drink fat-free (skim) milk or 1% milk. Eat fat-free yogurt and low-fat cottage cheese. Try low-fat cheeses such as mozzarella and other reduced-fat cheeses. Choose meat and other protein foods that are low in fat. Choose beans or other legumes such as split peas or lentils. Choose fish, skinless poultry (chicken or turkey), or lean cuts of red meat (beef or pork). Before you cook meat or poultry, cut off any visible fat. Use less fat and oil. Try baking foods instead of frying them. Add less fat, such as margarine, sour cream, regular salad dressing and mayonnaise to foods. Eat fewer high-fat foods.  Some examples of high-fat foods include french fries, doughnuts, ice cream, and cakes. Eat fewer sweets. Limit foods and drinks that are high in sugar. This includes candy, cookies, regular soda, and sweetened drinks. Exercise:  Exercise at least 30 minutes per day on most days of the week. Some examples of exercise include walking, biking, dancing, and swimming. You can also fit in more physical activity by taking the stairs instead of the elevator or parking farther away from stores. Ask your healthcare provider about the best exercise plan for you. © Copyright [employer] 2018 Information is for End User's use only and may not be sold, redistributed or otherwise used for commercial purposes.  All illustrations and images included in CareNotes® are the copyrighted property of [employer].A[employer]M., Inc. or [address] [place] [address]

## 2023-11-02 NOTE — PROGRESS NOTES
Assessment and Plan: Continues to see Gastro and Urology. Will repeat vitamin d and will have PSA done. Will give flu vaccine. Continues to see Oncology. Will follow up in one year or sooner if need be. Problem List Items Addressed This Visit          Digestive    Malignant neoplasm of sigmoid colon [facility])       Cardiovascular and Mediastinum    Essential hypertension       Genitourinary    Prostate cancer ([address])       Other    Pure hypercholesterolemia    Vitamin D deficiency    Relevant Orders    Vitamin D 25 hydroxy    Medicare annual wellness visit, subsequent - Primary    Encounter for immunization    Relevant Orders    influenza vaccine, high-dose, PF 0.7 mL (FLUZONE HIGH-DOSE)       Depression Screening and Follow-up Plan: Patient was screened for depression during today's encounter. They screened negative with a PHQ-2 score of 0. Preventive health issues were discussed with patient, and age appropriate screening tests were ordered as noted in patient's After Visit Summary. Personalized health advice and appropriate referrals for health education or preventive services given if needed, as noted in patient's After Visit Summary. History of Present Illness:     Patient presents for a Medicare Wellness Visit    Rama Gomez is for a follow up and medicare wellness. He is having a repeat colonoscopy done on Friday and does see Urology coming up. He is doing well and not having any issues. He would like a flu vaccine. He did have labs done and is still taking the Vitamin D. He offers no other issues       Patient Care Team:  Clary Priest as PCP - General (Family Medicine)  Neda Fletcher MD (Radiation Oncology)  Chente Bauer MD (Urology)     Review of Systems:     Review of Systems   All other systems reviewed and are negative.        Problem List:     Patient Active Problem List   Diagnosis    Malignant neoplasm of trigone of urinary bladder (HCC)    Screening for prostate cancer Malignant neoplasm of urinary bladder (HCC)    Abnormal prostate exam    Prostate cancer ([address])    Hyperuricemia    Pure hypercholesterolemia    Essential hypertension    Stage 2 chronic kidney disease    Persistent proteinuria    Acute exacerbation of chronic low back pain    BMI 30.0-30.9,adult    Vitamin D deficiency    Malignant neoplasm of sigmoid colon ([address])    Medicare annual wellness visit, subsequent    Encounter for immunization      Past Medical and Surgical History:     Past Medical History:   Diagnosis Date    Bladder cancer ([address])     2018- surgery/ mitomycin    Chronic pain disorder     low back    Degenerative joint disease involving multiple joints     Gout     Herniated lumbar intervertebral disc     Hypertension     Prostate CA ([address])     Tinnitus     Wears glasses     Wears partial dentures     uppers     Past Surgical History:   Procedure Laterality Date    CYSTOSCOPY  02/27/2019    CYSTOSCOPY  05/22/2020    CYSTOSCOPY  11/22/2022    Leah    SC BLADDER INSTILLATION ANTICARCINOGENIC AGENT N/A 11/14/2018    Procedure: INSTILLATION MITOMYCIN;  Surgeon: Brea Adler MD;  [facility];  Service: Urology    SC CYSTO W/REMOVAL OF LESIONS SMALL N/A 11/14/2018    Procedure: TRANSURETHRAL RESECTION OF BLADDER TUMOR (TURBT);   Surgeon: Brea Adler MD;  [facility];  Service: Urology    SC LAPS SURG [id number] RPBIC RAD W/NRV SPARING ROBOT N/A 01/27/2020    Procedure: PROSTATECTOMY RADICAL W ROBOTICS; Bilateral Pelvic Lymph Node Dissection;  Surgeon: Brea Adler MD;  [facility];  Service: Urology    SC PROSTATE NEEDLE BIOPSY ANY APPROACH N/A 10/15/2019    Procedure: Bel Cole;  Surgeon: Brea Adler MD;  [facility];  Service: Urology    TONSILLECTOMY      WISDOM TOOTH EXTRACTION        Family History:     Family History   Problem Relation Age of Onset    Heart attack Father     Throat cancer Father     Cancer Father         throat cancer    Heart disease Mother       Social History:     Social History     Socioeconomic History    Marital status:      Spouse name: None    Number of children: None    Years of education: None    Highest education level: None   Occupational History    None   Tobacco Use    Smoking status: Never    Smokeless tobacco: Never   Vaping Use    Vaping Use: Never used   Substance and Sexual Activity    Alcohol use: Yes     Comment: liquor    Drug use: Never    Sexual activity: Not Currently   Other Topics Concern    None   Social History Narrative    Does not consume caffeine      Social Determinants of Health     Financial Resource Strain: Low Risk  (11/2/2023)    Overall Financial Resource Strain (CARDIA)     Difficulty of Paying Living Expenses: Not hard at all   Food Insecurity: Not on file   Transportation Needs: No Transportation Needs (11/2/2023)    PRAPARE - Transportation     Lack of Transportation (Medical): No     Lack of Transportation (Non-Medical): No   Physical Activity: Not on file   Stress: Not on file   Social Connections: Not on file   Intimate Partner Violence: Not on file   Housing Stability: Not on file      Medications and Allergies:     Current Outpatient Medications   Medication Sig Dispense Refill    allopurinol (ZYLOPRIM) 100 mg tablet TAKE 2 TABLETS DAILY EARLY IN THE MORNING 180 tablet 3    cyclobenzaprine (FLEXERIL) 10 mg tablet Take 1 tablet (10 mg total) by mouth daily at bedtime As needed 30 tablet 0    ergocalciferol (VITAMIN D2) 50,000 units take 1 capsule by mouth every week 4 capsule 0    lisinopril (ZESTRIL) 2.5 mg tablet Take 1 tablet (2.5 mg total) by mouth daily 90 tablet 3    Plenvu 140 g SOLR USE AS DIRECTED PER PACKAGE INSTRUCTIONS       No current facility-administered medications for this visit.      Allergies   Allergen Reactions    Nsaids Tinnitus     AVOIDS due to tinnitus      Immunizations:     Immunization History Administered Date(s) Administered    COVID-19 Pfizer vac (Donnell-sucrose, gray cap) 12 yr+ IM 03/26/2021, 04/16/2021, 10/18/2021, 04/09/2022    Influenza, high dose seasonal 0.7 mL 10/07/2021    Influenza, recombinant, quadrivalent,injectable, preservative free 10/14/2020    Influenza, seasonal, injectable 10/24/2022      Health Maintenance:         Topic Date Due    Hepatitis C Screening  11/30/2024 (Originally 1955)         Topic Date Due    COVID-19 Vaccine (5 - Pfizer series) 06/04/2022    Influenza Vaccine (1) 09/01/2023      Medicare Screening Tests and Risk Assessments:     Liseth Jett is here for his Subsequent Wellness visit. Health Risk Assessment:   Patient rates overall health as good. Patient feels that their physical health rating is slightly better. Patient is satisfied with their life. Eyesight was rated as same. Hearing was rated as same. Patient feels that their emotional and mental health rating is same. Patients states they are never, rarely angry. Patient states they are never, rarely unusually tired/fatigued. Pain experienced in the last 7 days has been some. Patient's pain rating has been 2/10. Patient states that he has experienced no weight loss or gain in last 6 months. Depression Screening:   PHQ-2 Score: 0      Fall Risk Screening: In the past year, patient has experienced: no history of falling in past year      Home Safety:  Patient does not have trouble with stairs inside or outside of their home. Patient has working smoke alarms and has working carbon monoxide detector. Home safety hazards include: none. Nutrition:   Current diet is Regular. Medications:   Patient is not currently taking any over-the-counter supplements. Patient is able to manage medications.      Activities of Daily Living (ADLs)/Instrumental Activities of Daily Living (IADLs):   Walk and transfer into and out of bed and chair?: Yes  Dress and groom yourself?: Yes    Bathe or shower yourself?: Yes Feed yourself? Yes  Do your laundry/housekeeping?: Yes  Manage your money, pay your bills and track your expenses?: Yes  Make your own meals?: Yes    Do your own shopping?: Yes    Previous Hospitalizations:   Any hospitalizations or ED visits within the last 12 months?: Yes    How many hospitalizations have you had in the last year?: 1-2    Hospitalization Comments: colon resection    Advance Care Planning:   Living will: Yes    Durable POA for healthcare: Yes    Advanced directive: Yes    Advanced directive counseling given: No    Five wishes given: No    Patient declined ACP directive: No    End of Life Decisions reviewed with patient: No    Provider agrees with end of life decisions: Yes      Cognitive Screening:   Provider or family/friend/caregiver concerned regarding cognition?: No    PREVENTIVE SCREENINGS      Cardiovascular Screening:    General: History Lipid Disorder, Risks and Benefits Discussed and Screening Current      Diabetes Screening:     General: Risks and Benefits Discussed    Due for: Blood Glucose      Colorectal Cancer Screening:     General: History Colorectal Cancer, Risks and Benefits Discussed and Screening Current      Prostate Cancer Screening:    General: History Prostate Cancer, Risks and Benefits Discussed and Screening Current      Osteoporosis Screening:    General: Screening Not Indicated      Abdominal Aortic Aneurysm (AAA) Screening:    Risk factors include: age between 70-75 yo        Lung Cancer Screening:     General: Screening Not Indicated      Hepatitis C Screening:    General: Screening Current    Screening, Brief Intervention, and Referral to Treatment (SBIRT)    Screening  Typical number of drinks in a day: 0  Typical number of drinks in a week: 6  Interpretation: Low risk drinking behavior.     AUDIT-C Screenin) How often did you have a drink containing alcohol in the past year? 2 to 4 times a month  2) How many drinks did you have on a typical day when you were drinking in the past year? 3 to 4  3) How often did you have 6 or more drinks on one occasion in the past year? less than monthly    AUDIT-C Score: 3  Interpretation: Score 0-3 (male): Negative screen for alcohol misuse    Single Item Drug Screening:  How often have you used an illegal drug (including marijuana) or a prescription medication for non-medical reasons in the past year? never    Single Item Drug Screen Score: 0  Interpretation: Negative screen for possible drug use disorder    No results found. Physical Exam:     /66   Pulse 60   Temp 98.4 °F (36.9 °C) (Temporal)   Ht 5' 9" (1.753 m)   Wt 97.8 kg (215 lb 9.6 oz)   SpO2 98%   BMI 31.84 kg/m²     Physical Exam  Vitals reviewed. Constitutional:       Appearance: Normal appearance. He is normal weight. HENT:      Head: Normocephalic and atraumatic. Right Ear: Tympanic membrane, ear canal and external ear normal.      Left Ear: Tympanic membrane, ear canal and external ear normal.      Nose: Nose normal.      Mouth/Throat:      Mouth: Mucous membranes are moist.      Pharynx: Oropharynx is clear. Eyes:      Extraocular Movements: Extraocular movements intact. Conjunctiva/sclera: Conjunctivae normal.      Pupils: Pupils are equal, round, and reactive to light. Cardiovascular:      Rate and Rhythm: Normal rate and regular rhythm. Pulses: Normal pulses. Heart sounds: Normal heart sounds. Pulmonary:      Effort: Pulmonary effort is normal.      Breath sounds: Normal breath sounds. Abdominal:      General: Abdomen is flat. Bowel sounds are normal.      Palpations: Abdomen is soft. Musculoskeletal:         General: Normal range of motion. Cervical back: Normal range of motion and neck supple. Skin:     General: Skin is warm and dry. Capillary Refill: Capillary refill takes less than 2 seconds. Neurological:      General: No focal deficit present.       Mental Status: He is alert and oriented to person, place, and time. Mental status is at baseline. Psychiatric:         Mood and Affect: Mood normal.         Behavior: Behavior normal.         Thought Content:  Thought content normal.         Judgment: Judgment normal.          HUSSEIN Herman

## 2023-11-04 DIAGNOSIS — E55.9 VITAMIN D DEFICIENCY: ICD-10-CM

## 2023-11-06 RX ORDER — ERGOCALCIFEROL 1.25 MG/1
CAPSULE ORAL
Qty: 4 CAPSULE | Refills: 0 | Status: SHIPPED | OUTPATIENT
Start: 2023-11-06

## 2023-11-24 ENCOUNTER — PROCEDURE VISIT (OUTPATIENT)
Dept: UROLOGY | Facility: CLINIC | Age: 68
End: 2023-11-24
Payer: COMMERCIAL

## 2023-11-24 VITALS
DIASTOLIC BLOOD PRESSURE: 80 MMHG | HEIGHT: 69 IN | BODY MASS INDEX: 28.29 KG/M2 | HEART RATE: 63 BPM | OXYGEN SATURATION: 97 % | SYSTOLIC BLOOD PRESSURE: 140 MMHG | WEIGHT: 191 LBS

## 2023-11-24 DIAGNOSIS — C67.9 MALIGNANT NEOPLASM OF URINARY BLADDER, UNSPECIFIED SITE (HCC): Primary | ICD-10-CM

## 2023-11-24 DIAGNOSIS — C61 PROSTATE CANCER (HCC): ICD-10-CM

## 2023-11-24 PROCEDURE — 81002 URINALYSIS NONAUTO W/O SCOPE: CPT | Performed by: UROLOGY

## 2023-11-24 PROCEDURE — 99213 OFFICE O/P EST LOW 20 MIN: CPT | Performed by: UROLOGY

## 2023-11-24 PROCEDURE — 52000 CYSTOURETHROSCOPY: CPT | Performed by: UROLOGY

## 2023-11-24 NOTE — PROGRESS NOTES
Cystoscopy     Date/Time  11/24/2023 8:00 AM     Performed by  Filiberto Peralta MD   Authorized by  Filiberto Peralta MD     Universal Protocol:  Timeout called at: 11/24/2023 8:19 AM.      Procedure Details:  Procedure type: cystoscopy    Patient tolerance: Patient tolerated the procedure well with no immediate complications    Additional Procedure Details:   Cystoscopy procedure note:  Risk and benefits of flexible cystoscopy were discussed. Informed consent was obtained. Urine dip was adequate for cystoscopy. The patient was placed in the supine position. His genitalia was prepped with Betadine and draped in a sterile fashion. Viscous 2% lidocaine jelly was instilled into the urethra and allowed dwell time for local anesthesia. Flexible cystoscopy was then performed using a 16F scope. Prostate was surgically absent. Once inside the bladder, it was carefully inspected in a 360 degree fashion. There was no evidence of mucosal abnormalities, lesions, diverticula or stones. Both ureteral orifices in their orthotopic location were visualized with clear efflux of urine. Retroflexion for evaluation of the bladder neck was normal.  Overall this was a negative cystoscopy.

## 2023-11-24 NOTE — PROGRESS NOTES
UROLOGY FOLLOW-UP NOTE     CHIEF COMPLAINT   Angeli Pringle is a 80 y.o. male with a complaint of   Chief Complaint   Patient presents with    Follow-up    Cystoscopy     For Baldder cancer       History of Present Illness:     80 y.o. male who developed gross hematuria and some voiding dysfunction in January of 2017. The patient has had several occurrences over the last almost 2 years. He thought that he was passing stones although he has never had a history of stone disease in the past. Patient finally sought care with a nephrologist who recommended evaluation in our office. Patient was able to obtain a CT urogram prior to his visit. He has never been a cigarette or cigar smoker and does not use smokeless tobacco.  There is a cancer history in his family but not renal or bladder. Patient underwent a bladder tumor resection on the 11/14/2018. Pathology returned low-grade papillary urothelial carcinoma. Did develop post op urinary retention. Because of the PSA changes and some fullness felt on the right side of his prostate, the patient agreed to proceed with biopsy transperineal biopsy in early October 2019. Diagnosed with prostate cancer at that time. Patient now status post radical prostatectomy on 1/27/2020. Returns in followup of both bladder and prostate cancer. Patient has regained full continence. Working out 5 days a week. Very active. No concerns. Unfortunately, the patient lost his oldest son to an ATV accident prior to our last visit. Returns for cystoscopic surveillance of his bladder cancer. Now 5 years out for LgTa diagnosis.     Past Medical History:     Past Medical History:   Diagnosis Date    Bladder cancer ([address])     2018- surgery/ mitomycin    Chronic pain disorder     low back    Degenerative joint disease involving multiple joints     Gout     Herniated lumbar intervertebral disc     Hypertension     Prostate CA (HCC)     Tinnitus     Wears glasses     Wears partial dentures     uppers       PAST SURGICAL HISTORY:     Past Surgical History:   Procedure Laterality Date    CYSTOSCOPY  02/27/2019    CYSTOSCOPY  05/22/2020    CYSTOSCOPY  11/22/2022    [facility]    NC BLADDER INSTILLATION ANTICARCINOGENIC AGENT N/A 11/14/2018    Procedure: INSTILLATION MITOMYCIN;  Surgeon: Brea Adler MD;  Location: [facility];  Service: Urology    NC CYSTO W/REMOVAL OF LESIONS SMALL N/A 11/14/2018    Procedure: TRANSURETHRAL RESECTION OF BLADDER TUMOR (TURBT); Surgeon: Brea Adler MD;  Location: [facility];  Service: Urology    NC LAPS SURG [id number] RPBIC RAD W/NRV SPARING ROBOT N/A 01/27/2020    Procedure: PROSTATECTOMY RADICAL W ROBOTICS; Bilateral Pelvic Lymph Node Dissection;  Surgeon: Brea Adler MD;  Location: [facility];  Service: Urology    NC PROSTATE NEEDLE BIOPSY ANY APPROACH N/A 10/15/2019    Procedure: Ferna Sandman NEEDLE BIOPSY PROSTATE, CYSTOSCOPY;  Surgeon: Brea Adler MD;  [facility];  Service: Urology    TONSILLECTOMY      WISDOM TOOTH EXTRACTION         CURRENT MEDICATIONS:     Current Outpatient Medications   Medication Sig Dispense Refill    allopurinol (ZYLOPRIM) 100 mg tablet TAKE 2 TABLETS DAILY EARLY IN THE MORNING 180 tablet 3    cyclobenzaprine (FLEXERIL) 10 mg tablet Take 1 tablet (10 mg total) by mouth daily at bedtime As needed 30 tablet 0    ergocalciferol (VITAMIN D2) 50,000 units take 1 capsule by mouth every week 4 capsule 0    lisinopril (ZESTRIL) 2.5 mg tablet Take 1 tablet (2.5 mg total) by mouth daily 90 tablet 3     No current facility-administered medications for this visit. ALLERGIES:     Allergies   Allergen Reactions    Nsaids Tinnitus     AVOIDS due to tinnitus       SOCIAL HISTORY:     Social History     Socioeconomic History    Marital status:       Spouse name: None    Number of children: None    Years of education: None    Highest education level: None   Occupational History    None Tobacco Use    Smoking status: Never    Smokeless tobacco: Never   Vaping Use    Vaping Use: Never used   Substance and Sexual Activity    Alcohol use: Yes     Comment: liquor    Drug use: Never    Sexual activity: Not Currently   Other Topics Concern    None   Social History Narrative    Does not consume caffeine      Social Determinants of Health     Financial Resource Strain: Low Risk  (11/2/2023)    Overall Financial Resource Strain (CARDIA)     Difficulty of Paying Living Expenses: Not hard at all   Food Insecurity: Not on file   Transportation Needs: No Transportation Needs (11/2/2023)    PRAPARE - Transportation     Lack of Transportation (Medical): No     Lack of Transportation (Non-Medical): No   Physical Activity: Not on file   Stress: Not on file   Social Connections: Not on file   Intimate Partner Violence: Not on file   Housing Stability: Not on file       SOCIAL HISTORY:     Family History   Problem Relation Age of Onset    Heart attack Father     Throat cancer Father     Cancer Father         throat cancer    Heart disease Mother        REVIEW OF SYSTEMS:     Review of Systems   Constitutional: Negative. Negative for chills and fever. HENT:  Negative for ear pain and sore throat. Eyes:  Negative for pain and visual disturbance. Respiratory: Negative. Negative for cough and shortness of breath. Cardiovascular: Negative. Negative for chest pain and palpitations. Gastrointestinal: Negative. Negative for abdominal pain and vomiting. Genitourinary:  Negative for dysuria, enuresis and hematuria. Musculoskeletal: Negative. Negative for arthralgias and back pain. Skin: Negative. Negative for color change and rash. Neurological:  Negative for seizures and syncope. Psychiatric/Behavioral: Negative. All other systems reviewed and are negative.         PHYSICAL EXAM:     /80 (BP Location: Left arm, Patient Position: Sitting, Cuff Size: Standard)   Pulse 63   Ht 5' 9" (1.753 m)   Wt 86.6 kg (191 lb)   SpO2 97%   BMI 28.21 kg/m²     General:  Healthy appearing male in no acute distress. They have a normal affect. There is not appear to be any gross neurologic defects or abnormalities. HEENT:  Normocephalic, atraumatic. Neck is supple without any palpable lymphadenopathy  Cardiovascular:  Patient has normal palpable distal radial pulses. There is no significant peripheral edema. No JVD is noted. Respiratory:  Patient has unlabored respirations. There is no audible wheeze or rhonchi. Abdomen:  Abdomen is with healed surgical scars. Abdomen is soft and nontender. There is no tympany. Inguinal and umbilical hernia are not appreciated. Genitourinary: no penile lesions or discharge, no testicular masses or tenderness, no hernias    Musculoskeletal:  Patient does not have significant CVA tenderness in the  flank with palpation or percussion. They full range of motion in all 4 extremities. Strength in all 4 extremities appears congruent. Patient is able to ambulate without assistance or difficulty. Dermatologic:  Patient has no skin abnormalities or rashes. LABS:     CBC:   Lab Results   Component Value Date    WBC 7.78 2021    HGB 15.2 2021    HCT 44.9 2021    MCV 96 2021     2021       BMP:   Lab Results   Component Value Date    CALCIUM 8.4 2021    K 3.9 2021    CO2 26 2021     2021    BUN 17 2021    CREATININE 1.08 2021     Lab Results   Component Value Date    PSA <0.01 2023    PSA <0.1 10/31/2022    PSA <0.1 2021     IMAGIN/19/19  RENAL ULTRASOUND     INDICATION:   R33.8: Other retention of urine. History of prior bladder cancer surgery     COMPARISON: CT renal protocol 2018     TECHNIQUE:   Ultrasound of the retroperitoneum was performed with a curvilinear transducer utilizing volumetric sweeps and still imaging techniques.       FINDINGS: KIDNEYS:  Symmetric and normal size. Right kidney:  11.1 x 5.1 cm. Left kidney:  10.8 x 6.8 cm. Right kidney  Normal echogenicity and contour. No suspicious masses detected. No hydronephrosis. No shadowing calculi. No perinephric fluid collections. Left kidney  Normal echogenicity and contour. No suspicious masses detected. No hydronephrosis. No shadowing calculi. No perinephric fluid collections. URETERS:  Nonvisualized. BLADDER:   Normally distended. No focal thickening or mass lesions. Bilateral ureteral jets detected. IMPRESSION:     Normal.      9/12/18  CT ABDOMEN AND PELVIS WITH AND WITHOUT IV CONTRAST     INDICATION:   R31.0: Gross hematuria. COMPARISON:  CT scan 7/10/2018. TECHNIQUE: Initial CT of the abdomen and pelvis was performed without intravenous contrast.  Subsequent dynamic CT evaluation of the abdomen and pelvis was performed after the administration of intravenous contrast in both nephrographic and delayed   phases after the administration of intravenous contrast.  Axial, sagittal, and coronal 2D reformatted images were created from the source data and submitted for interpretation. Radiation dose length product (DLP) for this visit:  1852 mGy-cm . This examination, like all CT scans performed in the [facility], was performed utilizing techniques to minimize radiation dose exposure, including the use of iterative   reconstruction and automated exposure control. IV Contrast:  100 mL of iohexol (OMNIPAQUE)  Enteric Contrast:  Enteric contrast was not administered. FINDINGS:     ABDOMEN     RIGHT KIDNEY AND URETER:  No solid renal mass. No detectable urothelial mass. No hydronephrosis or hydroureter. No urinary tract calculi. No perinephric collection. LEFT KIDNEY AND URETER:  No solid renal mass. No detectable urothelial mass. No hydronephrosis or hydroureter. No urinary tract calculi.  No perinephric collection. URINARY BLADDER:  There is a 3.3 cm soft tissue mass in the left posterior lateral aspect of the bladder, adjacent to the ureteral orifice. No calculi. LOWER CHEST:  No clinically significant abnormality identified in the visualized lower chest.     LIVER/BILIARY TREE:  Unremarkable. GALLBLADDER:  No calcified gallstones. No pericholecystic inflammatory change. SPLEEN:  Unremarkable. PANCREAS:  Unremarkable. ADRENAL GLANDS:  Unremarkable. STOMACH AND BOWEL:  There is colonic diverticulosis without evidence of acute diverticulitis. ABDOMINOPELVIC CAVITY:  No ascites. No free intraperitoneal air. No lymphadenopathy. VESSELS:  Unremarkable for patient's age. PELVIS     REPRODUCTIVE ORGANS:  Unremarkable for patient's age. APPENDIX: No findings to suggest appendicitis. ABDOMINAL WALL/INGUINAL REGIONS:  There is a small fat-containing umbilical hernia. OSSEOUS STRUCTURES:  No acute fracture or destructive osseous lesion. IMPRESSION:     3.3 cm mass in the left posterior lateral aspect of the urinary bladder. No evidence of metastatic disease. Colonic diverticulosis. PATHOLOGY:     1/27/20  Final Diagnosis   A. Prostate gland:  - Adenocarcinoma, acinar type  - [place] score 3+4, grade group 2  - Per cent pattern 4 is approximately 15%  - No tumor seen at margins of resection  - Tumor appears confined to the right half of the prostate and comprises approximately 20% of the prostatic tissue  - Perineural invasion is identified  - See synoptic report for further details     B. Radha prostatic fat:    - No lymph nodes are identified and no malignancy is seen     C. Right pelvic lymph nodes:  - Five lymph nodes are identified showing no metastatic tumor     D. Left pelvic lymph nodes:  - Fatty tissue is seen with no lymph node and no malignancy identified     Note: Block A19 can be used if ancillary testing is requested.      10/15/19  Final Diagnosis   A. Prostate, right posterior medial, needle biopsy:  -  Prostatic adenocarcinoma, acinar, not otherwise specified; Megan grade 3 +4= score of 7 (grade group 2) in 2 of 2 cores involving 60% of needle core tissue and measuring up to 7 mm in contiguous length (score 4=10%). -  Periprostatic fat invasion: Not identified.  -  Seminal vesicle invasion: Not identified. -  Lymph-vascular invasion: Not identified.  -  Perineural invasion: Not identified.  -  Additional Pathologic Findings:  None. B.  Positive, right posterior lateral, needle biopsy:  -  Prostatic adenocarcinoma, acinar, not otherwise specified; Megan grade 3 +4= score of 7 (grade group 2) in 2 of 2 cores involving 90% of needle core tissue and measuring up to 9 mm in contiguous length (score 4=20-30%). -  Periprostatic fat invasion: Not identified.  -  Seminal vesicle invasion: Not identified. -  Lymph-vascular invasion: Not identified.  -  Perineural invasion: Present.  -  Additional Pathologic Findings:  None. C.  Prostate, right anterior lateral, needle biopsy:  -  Prostatic adenocarcinoma, acinar, not otherwise specified; Megan grade 3 +4= score of 7 (grade group 2) in 1 of 2 cores involving 40-50% of needle core tissue and measuring up to 9 mm in contiguous length (score 4=10-20%). -  Periprostatic fat invasion: Not identified.  -  Seminal vesicle invasion: Not identified. -  Lymph-vascular invasion: Not identified.  -  Perineural invasion: Not identified.  -  Additional Pathologic Findings:  None. D.  Prostate, right anterior medial, needle biopsy:  -  Benign prostate tissue, negative for malignancy. E.  Prostate, right base, needle biopsy:  -  Focal atypical small acinar proliferation, favor prostatic adenocarcinoma, focus shows crush artifact and is  too small to be further differentiated and appropriately graded.      F.  Prostate, left posterior medial, needle biopsy:  -  Benign prostate tissue, negative for malignancy. G.  Prostate, left posterior lateral, needle biopsy:  -  Benign prostate tissue, negative for malignancy. H.  Prostate, left anterior lateral, needle biopsy:  -  Benign prostate tissue, negative for malignancy. I.  Prostate, left anterior medial, needle biopsy:  -  Benign prostate tissue, negative for malignancy. J.  Prostate, left base, needle biopsy:  -  Benign prostate tissue, negative for malignancy. 11/14/18  Final Diagnosis   A. Urinary bladder, excision:             - Noninvasive low-grade papillary urothelial carcinoma. - Uninvolved muscularis propria is identified. PROCEDURE:     SEE NOTE    ASSESSMENT:     80 y.o. male with LGTa Bladder Cancer, cT1c Megan 3+4=7 prostate cancer now s/p RALP/PLND on 1/27/20 for [id number]    PLAN:     1. h/o LG Ta UC bladder, diagnosis 11/2018 -patient is now 5 years out from his diagnosis of low-grade noninvasive bladder cancer. Based on NCCN deadlines I will plan to see the patient back in 1 year to assess his urinalysis and for any new symptoms. Would consider cystoscopy as clinically indicated moving forward.     2. Prostate cancer -  PSA yearly

## 2023-12-01 DIAGNOSIS — E55.9 VITAMIN D DEFICIENCY: ICD-10-CM

## 2023-12-04 RX ORDER — ERGOCALCIFEROL 1.25 MG/1
CAPSULE ORAL
Qty: 4 CAPSULE | Refills: 0 | Status: SHIPPED | OUTPATIENT
Start: 2023-12-04

## 2023-12-26 DIAGNOSIS — E55.9 VITAMIN D DEFICIENCY: ICD-10-CM

## 2023-12-27 RX ORDER — ERGOCALCIFEROL 1.25 MG/1
CAPSULE ORAL
Qty: 4 CAPSULE | Refills: 0 | Status: SHIPPED | OUTPATIENT
Start: 2023-12-27

## 2024-01-01 PROBLEM — Z00.00 MEDICARE ANNUAL WELLNESS VISIT, SUBSEQUENT: Status: RESOLVED | Noted: 2023-11-02 | Resolved: 2024-01-01

## 2024-01-20 DIAGNOSIS — E55.9 VITAMIN D DEFICIENCY: ICD-10-CM

## 2024-01-22 RX ORDER — ERGOCALCIFEROL 1.25 MG/1
CAPSULE ORAL
Qty: 4 CAPSULE | Refills: 3 | Status: SHIPPED | OUTPATIENT
Start: 2024-01-22

## 2024-01-26 ENCOUNTER — VBI (OUTPATIENT)
Dept: ADMINISTRATIVE | Facility: OTHER | Age: 69
End: 2024-01-26

## 2024-02-16 DIAGNOSIS — E55.9 VITAMIN D DEFICIENCY: ICD-10-CM

## 2024-02-16 DIAGNOSIS — E79.0 HYPERURICEMIA: ICD-10-CM

## 2024-02-16 RX ORDER — ALLOPURINOL 100 MG/1
TABLET ORAL
Qty: 180 TABLET | Refills: 3 | Status: SHIPPED | OUTPATIENT
Start: 2024-02-16

## 2024-02-16 RX ORDER — ERGOCALCIFEROL 1.25 MG/1
50000 CAPSULE ORAL WEEKLY
Qty: 12 CAPSULE | Refills: 3 | Status: SHIPPED | OUTPATIENT
Start: 2024-02-16

## 2024-02-21 PROBLEM — Z12.5 SCREENING FOR PROSTATE CANCER: Status: RESOLVED | Noted: 2019-06-04 | Resolved: 2024-02-21

## 2024-03-12 DIAGNOSIS — E55.9 VITAMIN D DEFICIENCY: ICD-10-CM

## 2024-03-12 RX ORDER — ERGOCALCIFEROL 1.25 MG/1
50000 CAPSULE ORAL WEEKLY
Qty: 12 CAPSULE | Refills: 3 | Status: SHIPPED | OUTPATIENT
Start: 2024-03-12

## 2024-04-30 ENCOUNTER — OFFICE VISIT (OUTPATIENT)
Dept: INTERNAL MEDICINE CLINIC | Facility: CLINIC | Age: 69
End: 2024-04-30
Payer: COMMERCIAL

## 2024-04-30 VITALS
SYSTOLIC BLOOD PRESSURE: 160 MMHG | HEIGHT: 69 IN | TEMPERATURE: 98 F | OXYGEN SATURATION: 97 % | BODY MASS INDEX: 31.84 KG/M2 | HEART RATE: 66 BPM | DIASTOLIC BLOOD PRESSURE: 80 MMHG | WEIGHT: 215 LBS

## 2024-04-30 DIAGNOSIS — I10 ESSENTIAL HYPERTENSION: Primary | ICD-10-CM

## 2024-04-30 PROCEDURE — 3079F DIAST BP 80-89 MM HG: CPT | Performed by: NURSE PRACTITIONER

## 2024-04-30 PROCEDURE — 1159F MED LIST DOCD IN RCRD: CPT | Performed by: NURSE PRACTITIONER

## 2024-04-30 PROCEDURE — G2211 COMPLEX E/M VISIT ADD ON: HCPCS | Performed by: NURSE PRACTITIONER

## 2024-04-30 PROCEDURE — 99213 OFFICE O/P EST LOW 20 MIN: CPT | Performed by: NURSE PRACTITIONER

## 2024-04-30 PROCEDURE — 1160F RVW MEDS BY RX/DR IN RCRD: CPT | Performed by: NURSE PRACTITIONER

## 2024-04-30 PROCEDURE — 3077F SYST BP >= 140 MM HG: CPT | Performed by: NURSE PRACTITIONER

## 2024-04-30 RX ORDER — LISINOPRIL 20 MG/1
20 TABLET ORAL DAILY
Qty: 30 TABLET | Refills: 0 | Status: SHIPPED | OUTPATIENT
Start: 2024-04-30

## 2024-04-30 NOTE — PROGRESS NOTES
"Name: Juma Hernandez      : 1955      MRN: [id number]  Encounter Provider: HUSSEIN Amaya  Encounter Date: 2024   Encounter department: [facility]    Assessment & Plan Will increase his Zestril to 20 mg daily.  Will monitor at home and will call if continued high readings.      1. Essential hypertension  -     lisinopril (ZESTRIL) 20 mg tablet; Take 1 tablet (20 mg total) by mouth daily           Subjective      Juma is for an acute visit. He was at his Oncologist this week and his BP was running high. He was checking this at home and did not check it in some time. He denies any chest pain, SOB, or palpitations. He does taking Zestril 2.5 mg daily. He offers no other issues.      Review of Systems   All other systems reviewed and are negative.      Current Outpatient Medications on File Prior to Visit   Medication Sig    allopurinol (ZYLOPRIM) 100 mg tablet TAKE 2 TABLETS DAILY EARLY IN THE MORNING    cyclobenzaprine (FLEXERIL) 10 mg tablet Take 1 tablet (10 mg total) by mouth daily at bedtime As needed    ergocalciferol (VITAMIN D2) 50,000 units Take 1 capsule (50,000 Units total) by mouth once a week    [DISCONTINUED] lisinopril (ZESTRIL) 2.5 mg tablet Take 1 tablet (2.5 mg total) by mouth daily       Objective     /80   Pulse 66   Temp 98 °F (36.7 °C) (Temporal)   Ht 5' 9\" (1.753 m)   Wt 97.5 kg (215 lb)   SpO2 97%   BMI 31.75 kg/m²     Physical Exam  Vitals reviewed.   Constitutional:       Appearance: Normal appearance. He is normal weight.   Cardiovascular:      Rate and Rhythm: Normal rate and regular rhythm.      Pulses: Normal pulses.      Heart sounds: Normal heart sounds.   Pulmonary:      Effort: Pulmonary effort is normal.      Breath sounds: Normal breath sounds.   Musculoskeletal:         General: Normal range of motion.      Cervical back: Normal range of motion and neck supple.   Skin:     General: Skin is warm and dry.      Capillary " Refill: Capillary refill takes less than 2 seconds.   Neurological:      General: No focal deficit present.      Mental Status: He is alert and oriented to person, place, and time. Mental status is at baseline.   Psychiatric:         Mood and Affect: Mood normal.         Behavior: Behavior normal.         Thought Content: Thought content normal.         Judgment: Judgment normal.       HUSSEIN Amaya

## 2024-05-20 DIAGNOSIS — I10 ESSENTIAL HYPERTENSION: ICD-10-CM

## 2024-05-20 RX ORDER — LISINOPRIL 20 MG/1
20 TABLET ORAL DAILY
Qty: 90 TABLET | Refills: 1 | Status: SHIPPED | OUTPATIENT
Start: 2024-05-20

## 2024-06-04 DIAGNOSIS — I10 ESSENTIAL HYPERTENSION: ICD-10-CM

## 2024-06-05 RX ORDER — LISINOPRIL 20 MG/1
20 TABLET ORAL DAILY
Qty: 90 TABLET | Refills: 3 | Status: SHIPPED | OUTPATIENT
Start: 2024-06-05

## 2024-09-16 ENCOUNTER — TELEPHONE (OUTPATIENT)
Dept: UROLOGY | Facility: CLINIC | Age: 69
End: 2024-09-16

## 2024-09-16 DIAGNOSIS — C61 PROSTATE CANCER (HCC): Primary | ICD-10-CM

## 2024-11-12 ENCOUNTER — RA CDI HCC (OUTPATIENT)
Dept: OTHER | Facility: HOSPITAL | Age: 69
End: 2024-11-12

## 2024-11-18 ENCOUNTER — APPOINTMENT (OUTPATIENT)
Dept: LAB | Facility: CLINIC | Age: 69
End: 2024-11-18
Payer: COMMERCIAL

## 2024-11-18 ENCOUNTER — OFFICE VISIT (OUTPATIENT)
Dept: INTERNAL MEDICINE CLINIC | Facility: CLINIC | Age: 69
End: 2024-11-18
Payer: COMMERCIAL

## 2024-11-18 VITALS
DIASTOLIC BLOOD PRESSURE: 70 MMHG | OXYGEN SATURATION: 98 % | HEART RATE: 62 BPM | WEIGHT: 216.1 LBS | HEIGHT: 69 IN | BODY MASS INDEX: 32.01 KG/M2 | TEMPERATURE: 98.1 F | SYSTOLIC BLOOD PRESSURE: 124 MMHG

## 2024-11-18 DIAGNOSIS — C18.7 MALIGNANT NEOPLASM OF SIGMOID COLON (HCC): ICD-10-CM

## 2024-11-18 DIAGNOSIS — Z23 ENCOUNTER FOR IMMUNIZATION: ICD-10-CM

## 2024-11-18 DIAGNOSIS — E78.00 PURE HYPERCHOLESTEROLEMIA: ICD-10-CM

## 2024-11-18 DIAGNOSIS — N18.2 STAGE 2 CHRONIC KIDNEY DISEASE: ICD-10-CM

## 2024-11-18 DIAGNOSIS — I10 ESSENTIAL HYPERTENSION: ICD-10-CM

## 2024-11-18 DIAGNOSIS — Z00.00 MEDICARE ANNUAL WELLNESS VISIT, SUBSEQUENT: ICD-10-CM

## 2024-11-18 DIAGNOSIS — E55.9 VITAMIN D DEFICIENCY: ICD-10-CM

## 2024-11-18 DIAGNOSIS — C61 PROSTATE CANCER (HCC): Primary | ICD-10-CM

## 2024-11-18 DIAGNOSIS — C61 PROSTATE CANCER (HCC): ICD-10-CM

## 2024-11-18 PROBLEM — M54.50 ACUTE EXACERBATION OF CHRONIC LOW BACK PAIN: Status: RESOLVED | Noted: 2021-04-14 | Resolved: 2024-11-18

## 2024-11-18 PROBLEM — R39.89 ABNORMAL PROSTATE EXAM: Status: RESOLVED | Noted: 2019-09-05 | Resolved: 2024-11-18

## 2024-11-18 PROBLEM — G89.29 ACUTE EXACERBATION OF CHRONIC LOW BACK PAIN: Status: RESOLVED | Noted: 2021-04-14 | Resolved: 2024-11-18

## 2024-11-18 LAB
25(OH)D3 SERPL-MCNC: 63.1 NG/ML (ref 30–100)
CHOLEST SERPL-MCNC: 163 MG/DL (ref ?–200)
HDLC SERPL-MCNC: 41 MG/DL
LDLC SERPL CALC-MCNC: 101 MG/DL (ref 0–100)
NONHDLC SERPL-MCNC: 122 MG/DL
PSA SERPL-MCNC: <0.008 NG/ML (ref 0–4)
TRIGL SERPL-MCNC: 103 MG/DL (ref ?–150)
TSH SERPL DL<=0.05 MIU/L-ACNC: 2.03 UIU/ML (ref 0.45–4.5)

## 2024-11-18 PROCEDURE — G0439 PPPS, SUBSEQ VISIT: HCPCS | Performed by: NURSE PRACTITIONER

## 2024-11-18 PROCEDURE — 80061 LIPID PANEL: CPT

## 2024-11-18 PROCEDURE — G0008 ADMIN INFLUENZA VIRUS VAC: HCPCS | Performed by: NURSE PRACTITIONER

## 2024-11-18 PROCEDURE — 90662 IIV NO PRSV INCREASED AG IM: CPT | Performed by: NURSE PRACTITIONER

## 2024-11-18 PROCEDURE — 82306 VITAMIN D 25 HYDROXY: CPT

## 2024-11-18 PROCEDURE — 84153 ASSAY OF PSA TOTAL: CPT

## 2024-11-18 PROCEDURE — 36415 COLL VENOUS BLD VENIPUNCTURE: CPT

## 2024-11-18 PROCEDURE — 99214 OFFICE O/P EST MOD 30 MIN: CPT | Performed by: NURSE PRACTITIONER

## 2024-11-18 PROCEDURE — 84443 ASSAY THYROID STIM HORMONE: CPT

## 2024-11-18 RX ORDER — ERGOCALCIFEROL 1.25 MG/1
50000 CAPSULE, LIQUID FILLED ORAL WEEKLY
Qty: 12 CAPSULE | Refills: 3 | Status: SHIPPED | OUTPATIENT
Start: 2024-11-18

## 2024-11-18 NOTE — PATIENT INSTRUCTIONS
Medicare Preventive Visit Patient Instructions  Thank you for completing your Welcome to Medicare Visit or Medicare Annual Wellness Visit today. Your next wellness visit will be due in one year (11/19/2025).  The screening/preventive services that you may require over the next 5-10 years are detailed below. Some tests may not apply to you based off risk factors and/or age. Screening tests ordered at today's visit but not completed yet may show as past due. Also, please note that scanned in results may not display below.  Preventive Screenings:  Service Recommendations Previous Testing/Comments   Colorectal Cancer Screening  Colonoscopy    Fecal Occult Blood Test (FOBT)/Fecal Immunochemical Test (FIT)  Fecal DNA/Cologuard Test  Flexible Sigmoidoscopy Age: 45-75 years old   Colonoscopy: every 10 years (May be performed more frequently if at higher risk)  OR  FOBT/FIT: every 1 year  OR  Cologuard: every 3 years  OR  Sigmoidoscopy: every 5 years  Screening may be recommended earlier than age 45 if at higher risk for colorectal cancer. Also, an individualized decision between you and your healthcare provider will decide whether screening between the ages of 76-85 would be appropriate. Colonoscopy: Not on file  FOBT/FIT: Not on file  Cologuard: Not on file  Sigmoidoscopy: Not on file    History Colorectal Cancer     Prostate Cancer Screening Individualized decision between patient and health care provider in men between ages of 55-69   Medicare will cover every 12 months beginning on the day after your 50th birthday PSA: <0.01 ng/mL     History Prostate Cancer     Hepatitis C Screening Once for adults born between 1945 and 1965  More frequently in patients at high risk for Hepatitis C Hep C Antibody: Not on file    Screening Current   Diabetes Screening 1-2 times per year if you're at risk for diabetes or have pre-diabetes Fasting glucose: 87 mg/dL (9/17/2021)  A1C: No results in last 5 years (No results in last 5  years)  Screening Current   Cholesterol Screening Once every 5 years if you don't have a lipid disorder. May order more often based on risk factors. Lipid panel: 05/01/2023  Screening Not Indicated  History Lipid Disorder      Other Preventive Screenings Covered by Medicare:  Abdominal Aortic Aneurysm (AAA) Screening: covered once if your at risk. You're considered to be at risk if you have a family history of AAA or a male between the age of 65-75 who smoking at least 100 cigarettes in your lifetime.  Lung Cancer Screening: covers low dose CT scan once per year if you meet all of the following conditions: (1) Age 55-77; (2) No signs or symptoms of lung cancer; (3) Current smoker or have quit smoking within the last 15 years; (4) You have a tobacco smoking history of at least 20 pack years (packs per day x number of years you smoked); (5) You get a written order from a healthcare provider.  Glaucoma Screening: covered annually if you're considered high risk: (1) You have diabetes OR (2) Family history of glaucoma OR (3)  aged 50 and older OR (4)  American aged 65 and older  Osteoporosis Screening: covered every 2 years if you meet one of the following conditions: (1) Have a vertebral abnormality; (2) On glucocorticoid therapy for more than 3 months; (3) Have primary hyperparathyroidism; (4) On osteoporosis medications and need to assess response to drug therapy.  HIV Screening: covered annually if you're between the age of 15-65. Also covered annually if you are younger than 15 and older than 65 with risk factors for HIV infection. For pregnant patients, it is covered up to 3 times per pregnancy.    Immunizations:  Immunization Recommendations   Influenza Vaccine Annual influenza vaccination during flu season is recommended for all persons aged >= 6 months who do not have contraindications   Pneumococcal Vaccine   * Pneumococcal conjugate vaccine = PCV13 (Prevnar 13), PCV15 (Vaxneuvance),  PCV20 (Prevnar 20)  * Pneumococcal polysaccharide vaccine = PPSV23 (Pneumovax) Adults 19-65 yo with certain risk factors or if 65+ yo  If never received any pneumonia vaccine: recommend Prevnar 20 (PCV20)  Give PCV20 if previously received 1 dose of PCV13 or PPSV23   Hepatitis B Vaccine 3 dose series if at intermediate or high risk (ex: diabetes, end stage renal disease, liver disease)   Respiratory syncytial virus (RSV) Vaccine - COVERED BY MEDICARE PART D  * RSVPreF3 (Arexvy) CDC recommends that adults 60 years of age and older may receive a single dose of RSV vaccine using shared clinical decision-making (SCDM)   Tetanus (Td) Vaccine - COST NOT COVERED BY MEDICARE PART B Following completion of primary series, a booster dose should be given every 10 years to maintain immunity against tetanus. Td may also be given as tetanus wound prophylaxis.   Tdap Vaccine - COST NOT COVERED BY MEDICARE PART B Recommended at least once for all adults. For pregnant patients, recommended with each pregnancy.   Shingles Vaccine (Shingrix) - COST NOT COVERED BY MEDICARE PART B  2 shot series recommended in those 19 years and older who have or will have weakened immune systems or those 50 years and older     Health Maintenance Due:      Topic Date Due   • Hepatitis C Screening  11/30/2024 (Originally 1955)     Immunizations Due:      Topic Date Due   • Pneumococcal Vaccine: 65+ Years (1 of 1 - PCV) Never done   • Influenza Vaccine (1) 09/01/2024   • COVID-19 Vaccine (8 - 2024-25 season) 09/01/2024     Advance Directives   What are advance directives?  Advance directives are legal documents that state your wishes and plans for medical care. These plans are made ahead of time in case you lose your ability to make decisions for yourself. Advance directives can apply to any medical decision, such as the treatments you want, and if you want to donate organs.   What are the types of advance directives?  There are many types of advance  directives, and each state has rules about how to use them. You may choose a combination of any of the following:  Living will:  This is a written record of the treatment you want. You can also choose which treatments you do not want, which to limit, and which to stop at a certain time. This includes surgery, medicine, IV fluid, and tube feedings.   Durable power of  for healthcare (DPAHC):  This is a written record that states who you want to make healthcare choices for you when you are unable to make them for yourself. This person, called a proxy, is usually a family member or a friend. You may choose more than 1 proxy.  Do not resuscitate (DNR) order:  A DNR order is used in case your heart stops beating or you stop breathing. It is a request not to have certain forms of treatment, such as CPR. A DNR order may be included in other types of advance directives.  Medical directive:  This covers the care that you want if you are in a coma, near death, or unable to make decisions for yourself. You can list the treatments you want for each condition. Treatment may include pain medicine, surgery, blood transfusions, dialysis, IV or tube feedings, and a ventilator (breathing machine).  Values history:  This document has questions about your views, beliefs, and how you feel and think about life. This information can help others choose the care that you would choose.  Why are advance directives important?  An advance directive helps you control your care. Although spoken wishes may be used, it is better to have your wishes written down. Spoken wishes can be misunderstood, or not followed. Treatments may be given even if you do not want them. An advance directive may make it easier for your family to make difficult choices about your care.   Weight Management   Why it is important to manage your weight:  Being overweight increases your risk of health conditions such as heart disease, high blood pressure, type 2  diabetes, and certain types of cancer. It can also increase your risk for osteoarthritis, sleep apnea, and other respiratory problems. Aim for a slow, steady weight loss. Even a small amount of weight loss can lower your risk of health problems.  How to lose weight safely:  A safe and healthy way to lose weight is to eat fewer calories and get regular exercise. You can lose up about 1 pound a week by decreasing the number of calories you eat by 500 calories each day.   Healthy meal plan for weight management:  A healthy meal plan includes a variety of foods, contains fewer calories, and helps you stay healthy. A healthy meal plan includes the following:  Eat whole-grain foods more often.  A healthy meal plan should contain fiber. Fiber is the part of grains, fruits, and vegetables that is not broken down by your body. Whole-grain foods are healthy and provide extra fiber in your diet. Some examples of whole-grain foods are whole-wheat breads and pastas, oatmeal, brown rice, and bulgur.  Eat a variety of vegetables every day.  Include dark, leafy greens such as spinach, kale, jordon greens, and mustard greens. Eat yellow and orange vegetables such as carrots, sweet potatoes, and winter squash.   Eat a variety of fruits every day.  Choose fresh or canned fruit (canned in its own juice or light syrup) instead of juice. Fruit juice has very little or no fiber.  Eat low-fat dairy foods.  Drink fat-free (skim) milk or 1% milk. Eat fat-free yogurt and low-fat cottage cheese. Try low-fat cheeses such as mozzarella and other reduced-fat cheeses.  Choose meat and other protein foods that are low in fat.  Choose beans or other legumes such as split peas or lentils. Choose fish, skinless poultry (chicken or turkey), or lean cuts of red meat (beef or pork). Before you cook meat or poultry, cut off any visible fat.   Use less fat and oil.  Try baking foods instead of frying them. Add less fat, such as margarine, sour cream,  regular salad dressing and mayonnaise to foods. Eat fewer high-fat foods. Some examples of high-fat foods include french fries, doughnuts, ice cream, and cakes.  Eat fewer sweets.  Limit foods and drinks that are high in sugar. This includes candy, cookies, regular soda, and sweetened drinks.  Exercise:  Exercise at least 30 minutes per day on most days of the week. Some examples of exercise include walking, biking, dancing, and swimming. You can also fit in more physical activity by taking the stairs instead of the elevator or parking farther away from stores. Ask your healthcare provider about the best exercise plan for you.      © Copyright [employer] 2018 Information is for End User's use only and may not be sold, redistributed or otherwise used for commercial purposes. All illustrations and images included in CareNotes® are the copyrighted property of [employer] Inc[employer] or [employer]

## 2024-11-18 NOTE — ASSESSMENT & PLAN NOTE
Lab Results   Component Value Date    EGFR 68 10/21/2024    EGFR 66 07/22/2024    EGFR 72 04/22/2024    CREATININE 1.17 10/21/2024    CREATININE 1.20 07/22/2024    CREATININE 1.11 04/22/2024       Orders:    TSH, 3rd generation with Free T4 reflex; Future

## 2024-11-18 NOTE — ASSESSMENT & PLAN NOTE
Orders:    influenza vaccine, high-dose, PF 0.5 mL (Fluzone High Dose)    TSH, 3rd generation with Free T4 reflex; Future

## 2024-11-18 NOTE — PROGRESS NOTES
Name: Juma Hernandez      : 1955      MRN: [id number]  Encounter Provider: HUSSEIN Amaya  Encounter Date: 2024   Encounter department: [facility]    Assessment & Plan  Prostate cancer (HCC)    Orders:    TSH, 3rd generation with Free T4 reflex; Future    Stage 2 chronic kidney disease  Lab Results   Component Value Date    EGFR 68 10/21/2024    EGFR 66 2024    EGFR 72 2024    CREATININE 1.17 10/21/2024    CREATININE 1.20 2024    CREATININE 1.11 2024       Orders:    TSH, 3rd generation with Free T4 reflex; Future    Malignant neoplasm of sigmoid colon (HCC)    Orders:    TSH, 3rd generation with Free T4 reflex; Future    Essential hypertension    Orders:    TSH, 3rd generation with Free T4 reflex; Future    Pure hypercholesterolemia    Orders:    TSH, 3rd generation with Free T4 reflex; Future    Lipid panel; Future    Vitamin D deficiency    Orders:    ergocalciferol (VITAMIN D2) 50,000 units; Take 1 capsule (50,000 Units total) by mouth once a week    TSH, 3rd generation with Free T4 reflex; Future    Vitamin D 25 hydroxy; Future    Medicare annual wellness visit, subsequent    Orders:    TSH, 3rd generation with Free T4 reflex; Future    Encounter for immunization    Orders:    influenza vaccine, high-dose, PF 0.5 mL (Fluzone High Dose)    TSH, 3rd generation with Free T4 reflex; Future     Will repeat fasting labs. Up to date on screenings. Will give Flu vaccine. BP stable. Continues to see Urology and Hematology. He will follow up in one year or sooner if need be.    Preventive health issues were discussed with patient, and age appropriate screening tests were ordered as noted in patient's After Visit Summary. Personalized health advice and appropriate referrals for health education or preventive services given if needed, as noted in patient's After Visit Summary.    History of Present Illness     Rubén is for a medicare wellness and  follow up. He is doing well not having any concerns. He continues to see Urology and Hematology. He is to have a cysto done on the 26th. He denies any chest pain, SOB, or palpitations. He denies any depression or anxiety. He would like a flu vaccine. He is up to date on his screenings. He offers no other issues.       Patient Care Team:  HUSSEIN Amaya as PCP - General (Family Medicine)  Melissa Gómez MD (Radiation Oncology)  Jeison Lynn MD (Urology)    Review of Systems   All other systems reviewed and are negative.    Medical History Reviewed by provider this encounter:       Annual Wellness Visit Questionnaire   Juma is here for his Subsequent Wellness visit. Last Medicare Wellness visit information reviewed, patient interviewed and updates made to the record today.      Health Risk Assessment:   Patient rates overall health as good. Patient feels that their physical health rating is same. Patient is very satisfied with their life. Eyesight was rated as same. Hearing was rated as same. Patient feels that their emotional and mental health rating is same. Patients states they are never, rarely angry. Patient states they are never, rarely unusually tired/fatigued. Pain experienced in the last 7 days has been none. Patient states that he has experienced no weight loss or gain in last 6 months.     Depression Screening:   PHQ-2 Score: 0      Fall Risk Screening:   In the past year, patient has experienced: no history of falling in past year      Home Safety:  Patient does not have trouble with stairs inside or outside of their home. Patient has working smoke alarms and has working carbon monoxide detector. Home safety hazards include: none.     Nutrition:   Current diet is Regular.     Medications:   Patient is not currently taking any over-the-counter supplements. Patient is able to manage medications.     Activities of Daily Living (ADLs)/Instrumental Activities of Daily Living (IADLs):   Walk  and transfer into and out of bed and chair?: Yes  Dress and groom yourself?: Yes    Bathe or shower yourself?: Yes    Feed yourself? Yes  Do your laundry/housekeeping?: Yes  Manage your money, pay your bills and track your expenses?: Yes  Make your own meals?: Yes    Do your own shopping?: Yes    Previous Hospitalizations:   Any hospitalizations or ED visits within the last 12 months?: No      Advance Care Planning:   Living will: Yes    Durable POA for healthcare: Yes    Advanced directive: Yes    Advanced directive counseling given: No    Five wishes given: No    Patient declined ACP directive: No    End of Life Decisions reviewed with patient: No    Provider agrees with end of life decisions: Yes      PREVENTIVE SCREENINGS      Cardiovascular Screening:    General: History Lipid Disorder and Risks and Benefits Discussed    Due for: Lipid Panel, Cholesterol and Triglycerides      Diabetes Screening:     General: Risks and Benefits Discussed    Due for: Blood Glucose      Colorectal Cancer Screening:     General: History Colorectal Cancer and Risks and Benefits Discussed      Prostate Cancer Screening:    General: History Prostate Cancer and Risks and Benefits Discussed      Osteoporosis Screening:    General: Screening Not Indicated      Abdominal Aortic Aneurysm (AAA) Screening:    Risk factors include: age between 65-76 yo        Lung Cancer Screening:     General: Screening Not Indicated      Hepatitis C Screening:    General: Screening Current    Screening, Brief Intervention, and Referral to Treatment (SBIRT)    Screening  Typical number of drinks in a day: 0  Typical number of drinks in a week: 0  Interpretation: Low risk drinking behavior.    Single Item Drug Screening:  How often have you used an illegal drug (including marijuana) or a prescription medication for non-medical reasons in the past year? never    Single Item Drug Screen Score: 0  Interpretation: Negative screen for possible drug use  "disorder    Social Drivers of Health     Financial Resource Strain: Low Risk  (11/2/2023)    Overall Financial Resource Strain (CARDIA)     Difficulty of Paying Living Expenses: Not hard at all   Food Insecurity: No Food Insecurity (11/18/2024)    Hunger Vital Sign     Worried About Running Out of Food in the Last Year: Never true     Ran Out of Food in the Last Year: Never true   Transportation Needs: No Transportation Needs (11/18/2024)    PRAPARE - Transportation     Lack of Transportation (Medical): No     Lack of Transportation (Non-Medical): No   Housing Stability: Low Risk  (11/18/2024)    Housing Stability Vital Sign     Unable to Pay for Housing in the Last Year: No     Number of Times Moved in the Last Year: 0     Homeless in the Last Year: No   Utilities: Not At Risk (11/18/2024)    [facility] Utilities     Threatened with loss of utilities: No     No results found.    Objective   /70 (BP Location: Left arm, Patient Position: Sitting)   Pulse 62   Temp 98.1 °F (36.7 °C) (Temporal)   Ht 5' 9\" (1.753 m)   Wt 98 kg (216 lb 1.6 oz)   SpO2 98%   BMI 31.91 kg/m²     Physical Exam  Vitals reviewed.   Constitutional:       Appearance: Normal appearance. He is normal weight.   HENT:      Head: Normocephalic and atraumatic.      Right Ear: Tympanic membrane, ear canal and external ear normal.      Left Ear: Tympanic membrane, ear canal and external ear normal.      Nose: Nose normal.      Mouth/Throat:      Mouth: Mucous membranes are moist.      Pharynx: Oropharynx is clear.   Eyes:      Extraocular Movements: Extraocular movements intact.      Conjunctiva/sclera: Conjunctivae normal.      Pupils: Pupils are equal, round, and reactive to light.   Cardiovascular:      Rate and Rhythm: Normal rate and regular rhythm.      Pulses: Normal pulses.      Heart sounds: Normal heart sounds.   Pulmonary:      Effort: Pulmonary effort is normal.      Breath sounds: Normal breath sounds.   Abdominal:      General: " Abdomen is flat. Bowel sounds are normal.      Palpations: Abdomen is soft.   Musculoskeletal:         General: Normal range of motion.      Cervical back: Normal range of motion and neck supple.   Skin:     General: Skin is warm and dry.      Capillary Refill: Capillary refill takes less than 2 seconds.   Neurological:      General: No focal deficit present.      Mental Status: He is alert and oriented to person, place, and time. Mental status is at baseline.   Psychiatric:         Mood and Affect: Mood normal.         Behavior: Behavior normal.         Thought Content: Thought content normal.         Judgment: Judgment normal.

## 2024-11-18 NOTE — ASSESSMENT & PLAN NOTE
Orders:    ergocalciferol (VITAMIN D2) 50,000 units; Take 1 capsule (50,000 Units total) by mouth once a week    TSH, 3rd generation with Free T4 reflex; Future    Vitamin D 25 hydroxy; Future

## 2024-11-20 ENCOUNTER — RESULTS FOLLOW-UP (OUTPATIENT)
Dept: INTERNAL MEDICINE CLINIC | Facility: CLINIC | Age: 69
End: 2024-11-20

## 2024-11-21 NOTE — PROGRESS NOTES
"Ambulatory Visit  Name: Juma Hernandez      : 1955      MRN: [id number]  Encounter Provider: Aman Edgar MD  Encounter Date: 2024   Encounter department: [facility] UROLOGY [place]    Assessment & Plan  Encounter for follow-up surveillance of bladder cancer  Patient with history of low grade bladder cancer diagnosed 6 years ago. He has no recurrence.     We discussed the AUA-guidelines suggesting a shared decision making approach for surveillance of bladder cancer after 5 years.     I explained that I do not believe him to be at significant risk of recurrence and advised that he consider ceasing cystoscopy surveillance. We can reinvestigate his bladder as needed if he has any new lower urinary tract symptoms or gross hematuria. He is ok with this.  Orders:    POCT urine dip    Encounter for follow-up surveillance of prostate cancer  S/p radical prostatectomy in  with favorable intermediate risk disease and negative margins.     Risk of recurrence is low.    Continue PSA surveillance in 6 months. Can likely switch to annual PSA after that.   Orders:    POCT urine dip    PSA Total, Diagnostic; Future      History of Present Illness     Juma Hernandez is a 69 y.o. male who presents for follow up of low grade bladder cancer and favorable intermediate risk prostate s/p radical prostatectomy.     He is continent and there have been no changes in his health.     No new hematuria or urinary issues. Recent PSA undetectable.     History obtained from : patient        Objective     /80 (BP Location: Right arm, Patient Position: Sitting, Cuff Size: Adult)   Pulse 61   Ht 5' 9\" (1.753 m)   Wt 98.4 kg (217 lb)   SpO2 97%   BMI 32.05 kg/m²   Physical Exam  Vitals:    24 0905   BP: 130/80   Pulse: 61   SpO2: 97%      General: Well appearing, no acute distress   HEENT: normocephalic, atraumatic  Eyes: extraocular movements intact  Cardiovascular: normal rate   Respiratory: no " respiratory distress, effort normal   Abdominal: Non-distended, non-tender   : see cystoscopy report  MSK: Grossly normal range of motion   Neurological: No gross focal deficits  Behavioral: Normal mood and affect     Results  Lab Results   Component Value Date    PSA <0.008 11/18/2024    PSA <0.01 11/02/2023    PSA <0.1 10/31/2022     Lab Results   Component Value Date    CALCIUM 8.9 10/21/2024    K 4.3 10/21/2024    CO2 26 10/21/2024     10/21/2024    BUN 17 10/21/2024    CREATININE 1.17 10/21/2024     Lab Results   Component Value Date    WBC 7.78 09/17/2021    HGB 15.2 09/17/2021    HCT 44.9 09/17/2021    MCV 96 09/17/2021     09/17/2021       Office Urine Dip  Recent Results (from the past hour)   POCT urine dip    Collection Time: 11/26/24  9:12 AM   Result Value Ref Range    LEUKOCYTE ESTERASE,UA neg     NITRITE,UA neg     SL AMB POCT UROBILINOGEN 0.2     POCT URINE PROTEIN neg      PH,UA 60     BLOOD,UA neg     SPECIFIC GRAVITY,UA 1.005     KETONES,UA neg     BILIRUBIN,UA neg     GLUCOSE, UA neg      COLOR,UA yellow     CLARITY,UA clear        Administrative Statements        Cystoscopy     Date/Time  11/26/2024 9:00 AM     Performed by  Aman Edgar MD   Authorized by  Aman Edgar MD     Universal Protocol:  procedure performed by consultantConsent: Verbal consent obtained. Written consent obtained.  Risks and benefits: risks, benefits and alternatives were discussed  Consent given by: patient  Patient understanding: patient states understanding of the procedure being performed  Patient consent: the patient's understanding of the procedure matches consent given  Procedure consent: procedure consent matches procedure scheduled  Relevant documents: relevant documents present and verified  Test results: test results available and properly labeled  Site marked: the operative site was marked  Patient identity confirmed: verbally with patient      Procedure Details:  Procedure type:  cystoscopy    Additional Procedure Details: Office Cystoscopy Procedure Note    Indication:    Urothelial carcinoma of the bladder    Informed consent   The risks, benefits, complications, treatment options, and expected outcomes were discussed with the patient. The patient concurred with the proposed plan and provided informed consent.    Anesthesia  Lidocaine jelly 2%    Antibiotic prophylaxis   None    Procedure  The patient was placed in the supineposition, was prepped and draped in the usual manner using sterile technique, and 2% lidocaine jelly instilled into the urethra.  A 17 F flexible cystoscope was then inserted into the urethra and the urethra and bladder carefully examined.  The following findings were noted:    Findings:  Urethra:  Normal  Prostate:  Prostate surgically absent. Active voluntary sphincter  Bladder:  Mild trabeculations.   Ureteral orifices:  orthotopic  Other findings:  None, retroflexed view confirms    Specimens: None                 Complications:    None; patient tolerated the procedure well           Disposition: To home            Condition: Stable

## 2024-11-26 ENCOUNTER — PROCEDURE VISIT (OUTPATIENT)
Dept: UROLOGY | Facility: CLINIC | Age: 69
End: 2024-11-26
Payer: COMMERCIAL

## 2024-11-26 VITALS
DIASTOLIC BLOOD PRESSURE: 80 MMHG | SYSTOLIC BLOOD PRESSURE: 130 MMHG | OXYGEN SATURATION: 97 % | HEIGHT: 69 IN | BODY MASS INDEX: 32.14 KG/M2 | HEART RATE: 61 BPM | WEIGHT: 217 LBS

## 2024-11-26 DIAGNOSIS — Z85.46 ENCOUNTER FOR FOLLOW-UP SURVEILLANCE OF PROSTATE CANCER: ICD-10-CM

## 2024-11-26 DIAGNOSIS — Z85.51 ENCOUNTER FOR FOLLOW-UP SURVEILLANCE OF BLADDER CANCER: Primary | ICD-10-CM

## 2024-11-26 DIAGNOSIS — Z08 ENCOUNTER FOR FOLLOW-UP SURVEILLANCE OF PROSTATE CANCER: ICD-10-CM

## 2024-11-26 DIAGNOSIS — Z08 ENCOUNTER FOR FOLLOW-UP SURVEILLANCE OF BLADDER CANCER: Primary | ICD-10-CM

## 2024-11-26 LAB
SL AMB  POCT GLUCOSE, UA: NORMAL
SL AMB LEUKOCYTE ESTERASE,UA: NORMAL
SL AMB POCT BILIRUBIN,UA: NORMAL
SL AMB POCT BLOOD,UA: NORMAL
SL AMB POCT CLARITY,UA: CLEAR
SL AMB POCT COLOR,UA: YELLOW
SL AMB POCT KETONES,UA: NORMAL
SL AMB POCT NITRITE,UA: NORMAL
SL AMB POCT PH,UA: 60
SL AMB POCT SPECIFIC GRAVITY,UA: 1
SL AMB POCT URINE PROTEIN: NORMAL
SL AMB POCT UROBILINOGEN: 0.2

## 2024-11-26 PROCEDURE — 81002 URINALYSIS NONAUTO W/O SCOPE: CPT

## 2024-11-26 PROCEDURE — 52000 CYSTOURETHROSCOPY: CPT

## 2024-11-26 PROCEDURE — 99213 OFFICE O/P EST LOW 20 MIN: CPT

## 2025-01-20 DIAGNOSIS — E79.0 HYPERURICEMIA: ICD-10-CM

## 2025-01-20 RX ORDER — ALLOPURINOL 100 MG/1
TABLET ORAL
Qty: 180 TABLET | Refills: 1 | Status: SHIPPED | OUTPATIENT
Start: 2025-01-20

## 2025-05-08 DIAGNOSIS — I10 ESSENTIAL HYPERTENSION: ICD-10-CM

## 2025-05-08 RX ORDER — LISINOPRIL 20 MG/1
20 TABLET ORAL DAILY
Qty: 90 TABLET | Refills: 1 | Status: SHIPPED | OUTPATIENT
Start: 2025-05-08

## 2025-05-20 ENCOUNTER — APPOINTMENT (OUTPATIENT)
Dept: LAB | Facility: CLINIC | Age: 70
End: 2025-05-20
Payer: COMMERCIAL

## 2025-05-20 DIAGNOSIS — Z85.46 ENCOUNTER FOR FOLLOW-UP SURVEILLANCE OF PROSTATE CANCER: ICD-10-CM

## 2025-05-20 DIAGNOSIS — Z08 ENCOUNTER FOR FOLLOW-UP SURVEILLANCE OF PROSTATE CANCER: ICD-10-CM

## 2025-05-20 LAB — PSA SERPL-MCNC: <0.008 NG/ML (ref 0–4)

## 2025-05-20 PROCEDURE — 84153 ASSAY OF PSA TOTAL: CPT

## 2025-05-20 PROCEDURE — 36415 COLL VENOUS BLD VENIPUNCTURE: CPT

## 2025-05-21 ENCOUNTER — RESULTS FOLLOW-UP (OUTPATIENT)
Dept: OTHER | Facility: HOSPITAL | Age: 70
End: 2025-05-21

## 2025-05-22 NOTE — PROGRESS NOTES
5/27/2025      Chief Complaint   Patient presents with   • Follow-up     6 month f/u   • Prostate Cancer     Assessment and Plan    70 y.o. male managed by Dr. Edgar       1. Prostate cancer (HCC)  Assessment & Plan:    S/p radical prostatectomy in 2020 with favorable intermediate risk disease and negative margins.   Risk of recurrence is low.  PSA level stable   Plan to follow up in 1 year with PSA prior       Lab Results   Component Value Date    PSA <0.008 05/20/2025    PSA <0.008 11/18/2024    PSA <0.01 11/02/2023     Orders:  -     PSA Total, Diagnostic; Future; Expected date: 05/26/2026 (Use expected date for collection)  2. Malignant neoplasm of urinary bladder, unspecified site (HCC)  Assessment & Plan:  Patient with history of low grade bladder cancer diagnosed 6 years ago. He has no recurrence.   AUA-guidelines suggesting a shared decision making approach for surveillance of bladder cancer after 5 years  No significant risk of recurrence and advised to consider ceasing cystoscopy surveillance at last office visit with Dr. Edgar  Plan for cystoscopies as needed if any new lower urinary tract symptoms or gross hematuria  Last cystoscopy 11/2024-unremarkable  Plan follow-up in 1 year  Patient aware to call for any new symptoms      History of Present Illness  Juma Hernandez is a 70 y.o. male here for evaluation of prostate cancer and bladder cancer.  Radical prostatectomy in 2020.  Patient reports doing well since surgery.  PSA level has been undetectable.  He reports no incontinence.  He is very content with his urination at this time.  No  medication.  He does have a history of bladder cancer diagnosed about 6 years ago.  He completed his surveillance course of cystoscopies.  He denies gross hematuria or flank pain.  No new lower urinary tract symptoms.      Review of Systems   Constitutional:  Negative for chills and fever.   HENT:  Negative for ear pain and sore throat.    Eyes:  Negative for pain  "and visual disturbance.   Respiratory:  Negative for cough and shortness of breath.    Cardiovascular:  Negative for chest pain and palpitations.   Gastrointestinal:  Negative for abdominal pain and vomiting.   Genitourinary:  Negative for decreased urine volume, difficulty urinating, dysuria, flank pain, frequency, hematuria and urgency.   Musculoskeletal:  Negative for arthralgias and back pain.   Skin:  Negative for color change and rash.   Neurological:  Negative for seizures and syncope.   All other systems reviewed and are negative.               Vitals  Vitals:    05/27/25 0733   BP: 140/78   BP Location: Left arm   Patient Position: Sitting   Cuff Size: Large   Pulse: 59   SpO2: 98%   Weight: 95.3 kg (210 lb)   Height: 5' 9\" (1.753 m)       Physical Exam  Vitals reviewed.   Constitutional:       Appearance: Normal appearance.   HENT:      Head: Normocephalic and atraumatic.     Eyes:      Conjunctiva/sclera: Conjunctivae normal.     Pulmonary:      Effort: Pulmonary effort is normal.   Abdominal:      General: Abdomen is flat. There is no distension.      Palpations: Abdomen is soft.      Tenderness: There is no abdominal tenderness.   Genitourinary:     Penis: Normal.       Testes: Normal.     Musculoskeletal:         General: Normal range of motion.      Cervical back: Normal range of motion.     Skin:     General: Skin is warm and dry.     Neurological:      General: No focal deficit present.      Mental Status: He is alert and oriented to person, place, and time.     Psychiatric:         Mood and Affect: Mood normal.         Behavior: Behavior normal.         Thought Content: Thought content normal.         Judgment: Judgment normal.           Past History  Past Medical History[1]  Social History[2]  Tobacco Use History[3]  Family History[4]    The following portions of the patient's history were reviewed and updated as appropriate: allergies, current medications, past medical history, past social " history, past surgical history and problem list.    Results  No results found for this or any previous visit (from the past hour).]  Lab Results   Component Value Date    PSA <0.008 05/20/2025    PSA <0.008 11/18/2024    PSA <0.01 11/02/2023    PSA <0.1 10/31/2022     Lab Results   Component Value Date    CALCIUM 9 04/17/2025    K 4.5 04/17/2025    CO2 31 04/17/2025     04/17/2025    BUN 16 04/17/2025    CREATININE 1.16 04/17/2025     Lab Results   Component Value Date    WBC 7.78 09/17/2021    HGB 15.2 09/17/2021    HCT 44.9 09/17/2021    MCV 96 09/17/2021     09/17/2021              [1]  Past Medical History:  Diagnosis Date   • Bladder cancer (HCC)     2018- surgery/ mitomycin   • Cancer (HCC)    • Chronic pain disorder     low back   • Degenerative joint disease involving multiple joints    • Gout    • Herniated lumbar intervertebral disc    • Hypertension    • Prostate CA (HCC)    • Tinnitus    • Wears glasses    • Wears partial dentures     uppers   [2]  Social History  Socioeconomic History   • Marital status:    Tobacco Use   • Smoking status: Never   • Smokeless tobacco: Never   Vaping Use   • Vaping status: Never Used   Substance and Sexual Activity   • Alcohol use: Yes     Alcohol/week: 7.0 standard drinks of alcohol     Types: 7 Standard drinks or equivalent per week     Comment: Don't drink every week   • Drug use: Never   • Sexual activity: Not Currently   Social History Narrative    Does not consume caffeine      Social Drivers of Health     Financial Resource Strain: Not At Risk (4/18/2025)    Received from [facility]    Financial Insecurity    • In the last 12 months did you skip medications to save money?: No    • In the last 12 months was there a time when you needed to see a doctor but could not because of cost?: No   Food Insecurity: No Food Insecurity (4/18/2025)    Received from [facility]    Food Insecurity    • In the last 12 months did  you ever eat less than you felt you should because there wasn't enough money for food?: No   Transportation Needs: No Transportation Needs (4/18/2025)    Received from [facility]    Transportation Needs    • In the last 12 months have you ever had to go without healthcare because you didn't have a way to get there?: No   Social Connections: Socially Integrated (4/18/2025)    Received from [facility]    Social Connection    • Do you often feel lonely?: No   Housing Stability: Not At Risk (4/18/2025)    Received from [facility]    Housing Stability    • Are you worried that in the next 2 months you may not have stable housing?: No   [3]  Social History  Tobacco Use   Smoking Status Never   Smokeless Tobacco Never   [4]  Family History  Problem Relation Name Age of Onset   • Heart attack Father Juma VIDAL    • Throat cancer Father Juma VIDAL    • Cancer Father Juma VIDAL         throat cancer   • Heart disease Mother Kaia

## 2025-05-27 ENCOUNTER — OFFICE VISIT (OUTPATIENT)
Dept: UROLOGY | Facility: CLINIC | Age: 70
End: 2025-05-27
Payer: COMMERCIAL

## 2025-05-27 VITALS
DIASTOLIC BLOOD PRESSURE: 78 MMHG | WEIGHT: 210 LBS | OXYGEN SATURATION: 98 % | SYSTOLIC BLOOD PRESSURE: 140 MMHG | HEART RATE: 59 BPM | BODY MASS INDEX: 31.1 KG/M2 | HEIGHT: 69 IN

## 2025-05-27 DIAGNOSIS — C67.9 MALIGNANT NEOPLASM OF URINARY BLADDER, UNSPECIFIED SITE (HCC): ICD-10-CM

## 2025-05-27 DIAGNOSIS — C61 PROSTATE CANCER (HCC): Primary | ICD-10-CM

## 2025-05-27 PROCEDURE — 99213 OFFICE O/P EST LOW 20 MIN: CPT

## 2025-05-27 NOTE — ASSESSMENT & PLAN NOTE
S/p radical prostatectomy in 2020 with favorable intermediate risk disease and negative margins.   Risk of recurrence is low.  PSA level stable   Plan to follow up in 1 year with PSA prior       Lab Results   Component Value Date    PSA <0.008 05/20/2025    PSA <0.008 11/18/2024    PSA <0.01 11/02/2023

## 2025-05-27 NOTE — ASSESSMENT & PLAN NOTE
Patient with history of low grade bladder cancer diagnosed 6 years ago. He has no recurrence.   AUA-guidelines suggesting a shared decision making approach for surveillance of bladder cancer after 5 years  No significant risk of recurrence and advised to consider ceasing cystoscopy surveillance at last office visit with Dr. Edgar  Plan for cystoscopies as needed if any new lower urinary tract symptoms or gross hematuria  Last cystoscopy 11/2024-unremarkable  Plan follow-up in 1 year  Patient aware to call for any new symptoms

## 2025-07-18 DIAGNOSIS — E79.0 HYPERURICEMIA: ICD-10-CM

## 2025-07-18 RX ORDER — ALLOPURINOL 100 MG/1
TABLET ORAL
Qty: 180 TABLET | Refills: 1 | Status: SHIPPED | OUTPATIENT
Start: 2025-07-18

## (undated) DEVICE — TUBING SUCTION 5MM X 12 FT

## (undated) DEVICE — UROCATCH BAG

## (undated) DEVICE — CHLORAPREP HI-LITE 26ML ORANGE

## (undated) DEVICE — TELFA NON-ADHERENT ABSORBENT DRESSING: Brand: TELFA

## (undated) DEVICE — TIP COVER ACCESSORY

## (undated) DEVICE — SYSTEM TRANSPERINEAL ACCESS PRECISIONPOINT

## (undated) DEVICE — VESSEL SEALER EXTEND: Brand: ENDOWRIST

## (undated) DEVICE — CHEMOTHERAPY CONTAINER,HINGED LID, YELLOW: Brand: SHARPSAFETY

## (undated) DEVICE — COLUMN DRAPE

## (undated) DEVICE — ASTOUND STANDARD SURGICAL GOWN, XL: Brand: CONVERTORS

## (undated) DEVICE — GLOVE INDICATOR PI UNDERGLOVE SZ 7 BLUE

## (undated) DEVICE — SUT PDS II 0 CT-1 27 IN Z340H

## (undated) DEVICE — CATHETER PLUG WITH CAP: Brand: DOVER

## (undated) DEVICE — HEM-O-LOK CLIP CARTRIDGE LARGE DA VINCI SI/XI

## (undated) DEVICE — LARGE NEEDLE DRIVER: Brand: ENDOWRIST

## (undated) DEVICE — MARYLAND BIPOLAR FORCEPS: Brand: ENDOWRIST

## (undated) DEVICE — ARM DRAPE

## (undated) DEVICE — GLOVE INDICATOR PI UNDERGLOVE SZ 6.5 BLUE

## (undated) DEVICE — MAX-CORE® DISPOSABLE CORE BIOPSY INSTRUMENT, 18G X 25CM: Brand: MAX-CORE

## (undated) DEVICE — INTENDED FOR TISSUE SEPARATION, AND OTHER PROCEDURES THAT REQUIRE A SHARP SURGICAL BLADE TO PUNCTURE OR CUT.: Brand: BARD-PARKER SAFETY BLADES SIZE 11, STERILE

## (undated) DEVICE — CATH FOLEY 18FR 5ML 2 WAY SILICONE ELASTIMER

## (undated) DEVICE — Device

## (undated) DEVICE — GLOVE SRG BIOGEL 7

## (undated) DEVICE — TROCAR: Brand: KII FIOS FIRST ENTRY

## (undated) DEVICE — ADHESIVE SKIN HIGH VISCOSITY EXOFIN 1ML

## (undated) DEVICE — URIMETER 2500ML

## (undated) DEVICE — NEEDLE 25G X 1 1/2

## (undated) DEVICE — 3M™ DURAPORE™ SURGICAL TAPE 1538-3, 3 INCH X 10 YARD (7,5CM X 9,1M), 4 ROLLS/BOX: Brand: 3M™ DURAPORE™

## (undated) DEVICE — SUT VLOC 90 3-0 90 CV-23 L9 IN VLOCM1944

## (undated) DEVICE — GLOVE SRG BIOGEL 6

## (undated) DEVICE — GAUZE SPONGES,16 PLY: Brand: CURITY

## (undated) DEVICE — SUT VICRYL 0 CT-1 27 IN J260H

## (undated) DEVICE — CATH FOLEY 20FR 5ML 2 WAY SILICONE ELASTIMER

## (undated) DEVICE — JACKSON-PRATT 100CC BULB RESERVOIR: Brand: CARDINAL HEALTH

## (undated) DEVICE — EVACUATOR BLADDER ELLIK DISP STRL

## (undated) DEVICE — PREMIUM DRY TRAY LF: Brand: MEDLINE INDUSTRIES, INC.

## (undated) DEVICE — SPECIMEN CONTAINER STERILE PEEL PACK

## (undated) DEVICE — BASIC SINGLE BASIN-LF: Brand: MEDLINE INDUSTRIES, INC.

## (undated) DEVICE — SYRINGE 10ML LL

## (undated) DEVICE — GUARDIAN LVC: Brand: GUARDIAN

## (undated) DEVICE — DRAIN SPONGES,6 PLY: Brand: EXCILON

## (undated) DEVICE — SCD SEQUENTIAL COMPRESSION COMFORT SLEEVE MEDIUM KNEE LENGTH: Brand: KENDALL SCD

## (undated) DEVICE — GLOVE SRG BIOGEL 8

## (undated) DEVICE — CHLORHEXIDINE 4PCT 4 OZ

## (undated) DEVICE — ADAPTOR CATH

## (undated) DEVICE — SKIN MARKER DUAL TIP WITH RULER CAP, FLEXIBLE RULER AND LABELS: Brand: DEVON

## (undated) DEVICE — VISUALIZATION SYSTEM: Brand: CLEARIFY

## (undated) DEVICE — SUT MONOCRYL 4-0 PS-2 27 IN Y426H

## (undated) DEVICE — SUT ETHILON 3-0 FS-1 18 IN 663G

## (undated) DEVICE — DRAPE SHEET X-LG

## (undated) DEVICE — SUT VICRYL 0 UR-6 27 IN J603H

## (undated) DEVICE — POSITIONER EGGCRATE

## (undated) DEVICE — Device: Brand: OLYMPUS

## (undated) DEVICE — CANNULA SEAL

## (undated) DEVICE — ENDOPOUCH RETRIEVER SPECIMEN RETRIEVAL BAGS: Brand: ENDOPOUCH RETRIEVER

## (undated) DEVICE — HEAVY DUTY TABLE COVER: Brand: CONVERTORS

## (undated) DEVICE — PACK TUR

## (undated) DEVICE — JP CHANNEL DRAIN, 19FR HUBLESS: Brand: CARDINAL HEALTH

## (undated) DEVICE — CATH FOLEY COUDE 18FR 5ML 2 WAY TIEMANN LUBRICATH

## (undated) DEVICE — GLOVE INDICATOR PI UNDERGLOVE SZ 8 BLUE

## (undated) DEVICE — SUT VLOC 90 3-0 CV-23 9 IN VLOCM0844

## (undated) DEVICE — FLOSEAL HEMOSTATIC MATRIX, 10 ML: Brand: FLOSEAL

## (undated) DEVICE — [HIGH FLOW INSUFFLATOR,  DO NOT USE IF PACKAGE IS DAMAGED,  KEEP DRY,  KEEP AWAY FROM SUNLIGHT,  PROTECT FROM HEAT AND RADIOACTIVE SOURCES.]: Brand: PNEUMOSURE

## (undated) DEVICE — GLOVE SRG BIOGEL ECLIPSE 6.5

## (undated) DEVICE — KIT, BETHLEHEM ROBOTIC PROST: Brand: CARDINAL HEALTH

## (undated) DEVICE — MONOPOLAR CURVED SCISSORS: Brand: ENDOWRIST;DAVINCI SI

## (undated) DEVICE — DRAPE PROBE NEO-PROBE/ULTRASOUND

## (undated) DEVICE — LUBRICANT SURGILUBE TUBE 4 OZ  FLIP TOP

## (undated) DEVICE — PROGRASP FORCEPS: Brand: ENDOWRIST

## (undated) DEVICE — INSUFFLATION NEEDLE TO ESTABLISH PNEUMOPERITONEUM.: Brand: INSUFFLATION NEEDLE